# Patient Record
Sex: FEMALE | Race: WHITE | Employment: OTHER | URBAN - METROPOLITAN AREA
[De-identification: names, ages, dates, MRNs, and addresses within clinical notes are randomized per-mention and may not be internally consistent; named-entity substitution may affect disease eponyms.]

---

## 2017-01-04 ENCOUNTER — OFFICE VISIT (OUTPATIENT)
Dept: FAMILY MEDICINE CLINIC | Age: 72
End: 2017-01-04

## 2017-01-04 VITALS
RESPIRATION RATE: 16 BRPM | BODY MASS INDEX: 21.56 KG/M2 | SYSTOLIC BLOOD PRESSURE: 100 MMHG | HEART RATE: 92 BPM | TEMPERATURE: 98.2 F | OXYGEN SATURATION: 98 % | HEIGHT: 67 IN | WEIGHT: 137.4 LBS | DIASTOLIC BLOOD PRESSURE: 80 MMHG

## 2017-01-04 DIAGNOSIS — Z11.59 NEED FOR HEPATITIS C SCREENING TEST: ICD-10-CM

## 2017-01-04 DIAGNOSIS — G43.009 MIGRAINE WITHOUT AURA AND WITHOUT STATUS MIGRAINOSUS, NOT INTRACTABLE: ICD-10-CM

## 2017-01-04 DIAGNOSIS — E55.9 HYPOVITAMINOSIS D: ICD-10-CM

## 2017-01-04 DIAGNOSIS — R63.4 WEIGHT LOSS: Primary | ICD-10-CM

## 2017-01-04 DIAGNOSIS — E78.00 PURE HYPERCHOLESTEROLEMIA: ICD-10-CM

## 2017-01-04 RX ORDER — HYDROCODONE BITARTRATE AND ACETAMINOPHEN 7.5; 325 MG/1; MG/1
1 TABLET ORAL
Qty: 40 TAB | Refills: 0 | Status: SHIPPED | OUTPATIENT
Start: 2017-01-04 | End: 2017-12-04 | Stop reason: ALTCHOICE

## 2017-01-04 RX ORDER — CYPROHEPTADINE HYDROCHLORIDE 4 MG/1
4 TABLET ORAL 2 TIMES DAILY
Qty: 60 TAB | Refills: 2 | Status: SHIPPED | OUTPATIENT
Start: 2017-01-04 | End: 2017-04-04

## 2017-01-04 RX ORDER — TRAMADOL HYDROCHLORIDE 50 MG/1
50 TABLET ORAL
Qty: 50 TAB | Refills: 1 | Status: SHIPPED | OUTPATIENT
Start: 2017-01-04 | End: 2017-12-04 | Stop reason: ALTCHOICE

## 2017-01-04 NOTE — PROGRESS NOTES
Deborah Hernandez is a 70 y.o. female  Pt here today for follow up visit. 1. Have you been to the ER, urgent care clinic since your last visit? Hospitalized since your last visit? Yes Where: [facility]    2. Have you seen or consulted any other health care providers outside of the [address] [place] [address] since your last visit? Include any pap smears or colon screening.  Yes Where: neurology

## 2017-01-04 NOTE — MR AVS SNAPSHOT
Visit Information Date & Time Provider Department Dept. Phone Encounter #  
 1/4/2017  2:30 PM Lizbet Pina, 3 [facility] [phone] Upcoming Health Maintenance Date Due Hepatitis C Screening 1945 GLAUCOMA SCREENING Q2Y 7/7/2012 MEDICARE YEARLY EXAM 7/1/2017 BREAST CANCER SCRN MAMMOGRAM 7/26/2018 COLONOSCOPY 2/19/2020 DTaP/Tdap/Td series (3 - Td) 4/22/2026 Allergies as of 1/4/2017  Review Complete On: 1/4/2017 By: Lizbet Pina MD  
  
 Severity Noted Reaction Type Reactions Pcn [Penicillins]  04/30/2010    Rash Was tested is no longer allergic Current Immunizations  Reviewed on 6/30/2016 Name Date Influenza High Dose Vaccine PF 10/27/2016  9:24 AM, 10/19/2015 10:00 AM  
 Influenza Vaccine 11/18/2014 Influenza Vaccine Split 10/16/2012, 11/1/2010 Influenza Vaccine Whole 10/5/2011 PPD 9/14/2010 Pneumococcal Polysaccharide (PPSV-23) 2/24/2014, 1/1/2014 Pneumococcal Vaccine (Unspecified Type) 1/1/2007 TDAP Vaccine 4/13/2010 Td, Adsorbed PF 4/20/2010 Tdap 4/22/2016 Zoster 11/2/2009 Not reviewed this visit You Were Diagnosed With   
  
 Codes Comments Need for hepatitis C screening test    -  Primary ICD-10-CM: Z11.59 
ICD-9-CM: V73.89 Migraine without aura and without status migrainosus, not intractable     ICD-10-CM: Z13.336 ICD-9-CM: 346.10 Pure hypercholesterolemia     ICD-10-CM: E78.00 ICD-9-CM: 272.0 Weight loss     ICD-10-CM: R63.4 ICD-9-CM: 783.21 Hypovitaminosis D     ICD-10-CM: E55.9 ICD-9-CM: 268.9 Vitals BP Pulse Temp Resp Height(growth percentile) Weight(growth percentile) 100/80 92 98.2 °F (36.8 °C) (Oral) 16 5' 7\" (1.702 m) 137 lb 6.4 oz (62.3 kg) SpO2 BMI OB Status Smoking Status 98% 21.52 kg/m2 Postmenopausal Never Smoker Vitals History BMI and BSA Data Body Mass Index Body Surface Area 21.52 kg/m 2 1.72 m 2 Preferred Pharmacy Pharmacy Name Phone [id number] Neeraj Adame [phone] Your Updated Medication List  
  
   
This list is accurate as of: 1/4/17  3:30 PM.  Always use your most recent med list.  
  
  
  
  
 ALPRAZolam 0.25 mg tablet Commonly known as:  Harland Billy Take 1 Tab by mouth two (2) times daily as needed for Anxiety. atorvastatin 20 mg tablet Commonly known as:  LIPITOR Take 1 Tab by mouth daily. CALCIUM 600 + D 600-125 mg-unit Tab Generic drug:  calcium-cholecalciferol (d3) Take  by mouth two (2) times a day. CHEW Q 100 mg Chew Generic drug:  coenzyme q10 Take  by mouth daily. clonazePAM 0.5 mg tablet Commonly known as:  Hiro Aver Take  by mouth nightly as needed. cyproheptadine 4 mg tablet Commonly known as:  PERIACTIN Take 1 Tab by mouth two (2) times a day for 90 days. desonide 0.05 % cream  
Commonly known as:  Iver Dannieach Dexlansoprazole 60 mg Cpdb Commonly known as:  DEXILANT Take 1 Cap by mouth daily. FLUoxetine 40 mg capsule Commonly known as:  PROzac Take 1 Cap by mouth daily. HYDROcodone-acetaminophen 7.5-325 mg per tablet Commonly known as:  Vinod Azul Take 1 Tab by mouth every eight (8) hours as needed for Pain.  
  
 levothyroxine 50 mcg tablet Commonly known as:  SYNTHROID Take 1 Tab by mouth Daily (before breakfast). magnesium Tab Take  by mouth two (2) times a day. multivitamin tablet Commonly known as:  ONE A DAY Take 1 Tab by mouth daily. naratriptan 2.5 mg Tab Commonly known as:  Ruiz Coats Take 2.5 mg by mouth once as needed. sucralfate 100 mg/mL suspension Commonly known as:  Danney Jeffrey Take 5 mL by mouth Before breakfast and dinner. topiramate 25 mg tablet Commonly known as:  TOPAMAX Take  by mouth four (4) times daily. traMADol 50 mg tablet Commonly known as:  ULTRAM  
Take 1 Tab by mouth every six (6) hours as needed for Pain. Max Daily Amount: 200 mg.  
  
 verapamil  mg ER capsule Commonly known as:  VERELAN  
TAKE 1 CAPSULE DAILY  
  
 VITAMIN B-2 100 mg tablet Generic drug:  riboflavin (vitamin B2) TAKE 1 TABLET BY MOUTH TWO TIMES A DAY. Prescriptions Printed Refills  
 traMADol (ULTRAM) 50 mg tablet 1 Sig: Take 1 Tab by mouth every six (6) hours as needed for Pain. Max Daily Amount: 200 mg. Class: Print Route: Oral  
 HYDROcodone-acetaminophen (NORCO) 7.5-325 mg per tablet 0 Sig: Take 1 Tab by mouth every eight (8) hours as needed for Pain. Class: Print Route: Oral  
  
Prescriptions Sent to Pharmacy Refills  
 cyproheptadine (PERIACTIN) 4 mg tablet 2 Sig: Take 1 Tab by mouth two (2) times a day for 90 days. Class: Normal  
 Pharmacy: [address] #: [phone] Route: Oral  
  
To-Do List   
 01/04/2017 Lab:  CBC WITH AUTOMATED DIFF   
  
 01/04/2017 Lab:  HEPATIC FUNCTION PANEL   
  
 01/04/2017 Lab:  HEPATITIS C AB   
  
 01/04/2017 Lab:  LIPID PANEL   
  
 01/04/2017 Lab:  METABOLIC PANEL, BASIC   
  
 01/04/2017 Lab:  T4, FREE   
  
 01/04/2017 Lab:  TSH 3RD GENERATION   
  
 01/04/2017 Lab:  URINALYSIS W/ RFLX MICROSCOPIC   
  
 01/04/2017 Lab:  VITAMIN D, 25 HYDROXY Referral Information Referral ID Referred By Referred To  
  
 [id number] Freddy ALVAREZ Not Available Visits Status Start Date End Date 1 New Request 1/4/17 1/4/18 If your referral has a status of pending review or denied, additional information will be sent to support the outcome of this decision. Introducing [facility]! Dear Elidia Bai: Thank you for requesting a [employer] account. Our records indicate that you already have an active [employer] account.   You can access your account anytime at [email or web address] Did you know that you can access your hospital and ER discharge instructions at any time in [employer]? You can also review all of your test results from your hospital stay or ER visit. Additional Information If you have questions, please visit the Frequently Asked Questions section of the [employer] website at [email or web address] Remember, [employer] is NOT to be used for urgent needs. For medical emergencies, dial 911. Now available from your iPhone and Android! Please provide this summary of care documentation to your next provider. Your primary care clinician is listed as Ananth 51. If you have any questions after today's visit, please call [phone].

## 2017-01-05 NOTE — PROGRESS NOTES
Assessment/Plan:    Wisam Mina was seen today for medication evaluation and arm pain. Diagnoses and all orders for this visit:    Weight loss  -     cyproheptadine (PERIACTIN) 4 mg tablet; Take 1 Tab by mouth two (2) times a day for 90 days.  -     REFERRAL TO GASTROENTEROLOGY    Migraine without aura and without status migrainosus, not intractable  -     traMADol (ULTRAM) 50 mg tablet; Take 1 Tab by mouth every six (6) hours as needed for Pain. Max Daily Amount: 200 mg.  -     HYDROcodone-acetaminophen (NORCO) 7.5-325 mg per tablet; Take 1 Tab by mouth every eight (8) hours as needed for Pain. Need for hepatitis C screening test  -     HEPATITIS C AB; Future    Pure hypercholesterolemia  -     CBC WITH AUTOMATED DIFF; Future  -     HEPATIC FUNCTION PANEL; Future  -     LIPID PANEL; Future  -     METABOLIC PANEL, BASIC; Future  -     TSH 3RD GENERATION; Future  -     T4, FREE; Future  -     URINALYSIS W/ RFLX MICROSCOPIC; Future  -     VITAMIN D, 25 HYDROXY; Future    Hypovitaminosis D  -     VITAMIN D, 25 HYDROXY; Future        Will do a trial of cyproheptadine to see if this can stimulate her appetite. There has been successful off label use of this in past.    F/u 6 weeks for wt check. Will try referring for for a 2nd opinion on her wt loss and gastric issues to a new GI. The plan was discussed with the patient. The patient verbalized understanding and is in agreement with the plan. All medication potential side effects were discussed with the patient.    -------------------------------------------------------------------------------------------------------------------        Michael Deleon is a 70 y.o. female and presents with Medication Evaluation and Arm Pain         Subjective:  Pt returns for f/u. Still struggling with her weight loss. Has been linked to her GI issues (review other GI notes and office notes in chart for details).   She has worked with Dr. Satish Blunt for an extended period of time in trying to figure out the cause of her loss of appetite and intolerance to various types of foods but she has not found any solution. She says he has done all that he can do for her at this point. She is frustrated and concerned about the wt loss and rightly so. She has had extensive work up and scans to r/o malignancy. She has had negative CTs of abd pelvis and chest CTs x 2 since 2010 that showed stable pulmonary nodules with no past smoking history. No further CTs of chest were recommended. Also had a CXR in Oct 2016 which showed no masses. Migranes: pt working with Dr. Loretta Dandy and making progress but need pain meds filled. Dr. Loretta Dandy will not do this. ROS:  Constitutional: No recent weight change. No weakness/fatigue. No f/c. Skin: No rashes, change in nails/hair, itching   HENT: No HA, dizziness. No hearing loss/tinnitus. No nasal congestion/discharge. Eyes: No change in vision, double/blurred vision or eye pain/redness. Cardiovascular: No CP/palpitations. No BURCH/orthopnea/PND. Respiratory: No cough/sputum, dyspnea, wheezing. Gastointestinal: No dysphagia, reflux. No n/v. No constipation/diarrhea. No melena/rectal bleeding. Genitourinary: No dysuria, urinary hesitancy, nocturia, hematuria. No incontinence. Musculoskeletal: No joint pain/stiffness. No muscle pain/tenderness. Endo: No heat/cold intolerance, no polyuria/polydypsia. Heme: No h/o anemia. No easy bleeding/bruising. Allergy/Immunology: No seasonal rhinitis. Denies frequent colds, sinus/ear infections. Neurological: No seizures/numbness/weakness. No paresthesias. Psychiatric:  No depression, anxiety. The problem list was updated as a part of today's visit.   Patient Active Problem List   Diagnosis Code    Hyperlipidemia E78.5    Connective tissue disease ([facility] [address] [address] M35.9    Depression F32.9    Hypertension I10    HH (hiatus hernia) K44.9    Gastroesophageal reflux disease K21.9    Rosacea L71.9  Capsulitis M77.9    Degeneration of intervertebral disc of cervical region M50.30    Allergy T78.40XA    Colitis, ischemic (HCC) K55.9    Migraine G43.909    Anxiety associated with depression F41.8    Raynaud's syndrome I73.00    Hypothyroidism E03.9    Pulmonary nodules R91.8    Renal cyst N28.1    Hypovitaminosis D E55.9    Biliary dyskinesia K82.8    Chronic tension-type headache, intractable G44.221    Osteoarthritis M19.90    Vascular insufficiency of intestine (HCC) K55.9    Dyslipidemia E78.5    Classical migraine with intractable migraine G43.119    Rotator cuff tear arthropathy M12.819    Lower gastrointestinal hemorrhage K92.2    Cervico-occipital neuralgia M54.81    Shoulder pain M25.519    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 SH:  Social History   Substance Use Topics    Smoking status: Never Smoker    Smokeless tobacco: Never Used    Alcohol use No       Medications/Allergies:  Current Outpatient Prescriptions on File Prior to Visit   Medication Sig Dispense Refill    FLUoxetine (PROZAC) 40 mg capsule Take 1 Cap by mouth daily. 90 Cap 3    verapamil ER (VERELAN) 360 mg ER capsule TAKE 1 CAPSULE DAILY 90 Cap 2    clonazePAM (KLONOPIN) 0.5 mg tablet Take  by mouth nightly as needed.  VITAMIN B-2 100 mg tablet TAKE 1 TABLET BY MOUTH TWO TIMES A DAY. 180 Tab 0    topiramate (TOPAMAX) 25 mg tablet Take  by mouth four (4) times daily.  naratriptan (AMERGE) 2.5 mg tab Take 2.5 mg by mouth once as needed.  levothyroxine (SYNTHROID) 50 mcg tablet Take 1 Tab by mouth Daily (before breakfast). 90 Tab 2    atorvastatin (LIPITOR) 20 mg tablet Take 1 Tab by mouth daily. 90 Tab 2    Dexlansoprazole (DEXILANT) 60 mg CpDB Take 1 Cap by mouth daily.  90 Cap 3    calcium-cholecalciferol, d3, (CALCIUM 600 + D) 600-125 mg-unit tab Take by mouth two (2) times a day.  coenzyme q10 (CHEW Q) 100 mg chew Take  by mouth daily.  multivitamin (ONE A DAY) tablet Take 1 Tab by mouth daily.  magnesium Tab Take  by mouth two (2) times a day.  desonide (TRIDESILON) 0.05 % cream   3    sucralfate (CARAFATE) 100 mg/mL suspension Take 5 mL by mouth Before breakfast and dinner. 414 mL 3    ALPRAZolam (XANAX) 0.25 mg tablet Take 1 Tab by mouth two (2) times daily as needed for Anxiety. 50 Tab 0     No current facility-administered medications on file prior to visit. Allergies   Allergen Reactions    Pcn [Penicillins] Rash     Was tested is no longer allergic         Health Maintenance:   Health Maintenance   Topic Date Due    Hepatitis C Screening  1945    GLAUCOMA SCREENING Q2Y  07/07/2012    MEDICARE YEARLY EXAM  07/01/2017    BREAST CANCER SCRN MAMMOGRAM  07/26/2018    COLONOSCOPY  02/19/2020    DTaP/Tdap/Td series (3 - Td) 04/22/2026    OSTEOPOROSIS SCREENING (DEXA)  Completed    ZOSTER VACCINE AGE 60>  Completed    Pneumococcal 65+ Low/Medium Risk  Completed    INFLUENZA AGE 9 TO ADULT  Addressed       Objective:  Visit Vitals    /80    Pulse 92    Temp 98.2 °F (36.8 °C) (Oral)    Resp 16    Ht 5' 7\" (1.702 m)    Wt 137 lb 6.4 oz (62.3 kg)    SpO2 98%    BMI 21.52 kg/m2          Nurses notes and VS reviewed. Physical Examination: General appearance - looks underweight.         Labwork and Ancillary Studies:    CBC w/Diff  Lab Results   Component Value Date/Time    WBC 5.2 11/11/2016 11:07 AM    HGB 14.5 11/11/2016 11:07 AM    PLATELET 728 13/10/5907 11:07 AM         Basic Metabolic Profile  Lab Results   Component Value Date/Time    Sodium 144 11/11/2016 11:07 AM    Potassium 3.6 11/11/2016 11:07 AM    Chloride 109 11/11/2016 11:07 AM    CO2 25 11/11/2016 11:07 AM    CO2, TOTAL 23 04/28/2016 01:48 PM    Anion gap 10 11/11/2016 11:07 AM    Glucose 81 11/11/2016 11:07 AM    BUN 12 11/11/2016 11:07 AM    Creatinine 0.95 11/11/2016 11:07 AM    BUN/Creatinine ratio 13 11/11/2016 11:07 AM    GFR est AA >60 11/11/2016 11:07 AM    GFR est non-AA 58 11/11/2016 11:07 AM    Calcium 8.8 11/11/2016 11:07 AM         LFT  Lab Results   Component Value Date/Time    ALT 18 11/11/2016 11:07 AM    AST 15 11/11/2016 11:07 AM    Alk.  phosphatase 62 11/11/2016 11:07 AM    Bilirubin, direct 0.2 02/19/2016 09:55 AM    Bilirubin, total 0.3 11/11/2016 11:07 AM         Cholesterol  Lab Results   Component Value Date/Time    Cholesterol, total 144 02/19/2016 09:55 AM    HDL Cholesterol 73 02/19/2016 09:55 AM    LDL, calculated 58.4 02/19/2016 09:55 AM    Triglyceride 63 02/19/2016 09:55 AM    CHOL/HDL Ratio 2.0 02/19/2016 09:55 AM

## 2017-01-31 ENCOUNTER — HOSPITAL ENCOUNTER (OUTPATIENT)
Dept: LAB | Age: 72
Discharge: HOME | End: 2017-01-31
Payer: MEDICARE

## 2017-01-31 ENCOUNTER — OFFICE VISIT (OUTPATIENT)
Dept: FAMILY MEDICINE CLINIC | Age: 72
End: 2017-01-31

## 2017-01-31 VITALS
OXYGEN SATURATION: 100 % | TEMPERATURE: 97.2 F | HEART RATE: 80 BPM | BODY MASS INDEX: 22.29 KG/M2 | HEIGHT: 67 IN | WEIGHT: 142 LBS | DIASTOLIC BLOOD PRESSURE: 85 MMHG | SYSTOLIC BLOOD PRESSURE: 143 MMHG | RESPIRATION RATE: 16 BRPM

## 2017-01-31 DIAGNOSIS — M79.675 PAIN OF TOE OF LEFT FOOT: ICD-10-CM

## 2017-01-31 DIAGNOSIS — M79.675 PAIN OF TOE OF LEFT FOOT: Primary | ICD-10-CM

## 2017-01-31 LAB — URATE SERPL-MCNC: 3.5 MG/DL (ref 2.6–7.2)

## 2017-01-31 PROCEDURE — 84550 ASSAY OF BLOOD/URIC ACID: CPT | Performed by: NURSE PRACTITIONER

## 2017-01-31 PROCEDURE — 36415 COLL VENOUS BLD VENIPUNCTURE: CPT | Performed by: NURSE PRACTITIONER

## 2017-01-31 NOTE — PROGRESS NOTES
HISTORY OF PRESENT ILLNESS  Clarissa Whitaker is a 70 y.o. female. HPI acute onset of left great toe pain aggravated with weight bearing of unknown etiology. There is mild soft tissue swelling to the toe in general and erythema laterally which improves when she is off her feet that was noted in the clinic this morning. No fever, trauma, or injury. No treatment to date. Symptom onset acute, timing constant, pain mild to moderate. Past Medical History   Diagnosis Date    Allergy     Anxiety associated with depression 8/2/2011    Arthritis     Colitis, ischemic (HCC)     Connective tissue disease ([facility] [address]     Degenerative disc disease     Depression      anxiety    GERD (gastroesophageal reflux disease)     Headache(784.0)      migraines    HH (hiatus hernia)     Hyperlipidemia     Hypertension     Hypovitaminosis D 10/19/2015    Other forms of migraine, without mention of intractable migraine without mention of status migrainosus     Pulmonary nodules 2/25/2010    Raynaud's syndrome     Renal cyst 2/25/2014    Rosacea        Current Outpatient Prescriptions:     cyproheptadine (PERIACTIN) 4 mg tablet, Take 1 Tab by mouth two (2) times a day for 90 days. , Disp: 60 Tab, Rfl: 2    traMADol (ULTRAM) 50 mg tablet, Take 1 Tab by mouth every six (6) hours as needed for Pain. Max Daily Amount: 200 mg., Disp: 50 Tab, Rfl: 1    HYDROcodone-acetaminophen (NORCO) 7.5-325 mg per tablet, Take 1 Tab by mouth every eight (8) hours as needed for Pain., Disp: 40 Tab, Rfl: 0    FLUoxetine (PROZAC) 40 mg capsule, Take 1 Cap by mouth daily. , Disp: 90 Cap, Rfl: 3    verapamil ER (VERELAN) 360 mg ER capsule, TAKE 1 CAPSULE DAILY, Disp: 90 Cap, Rfl: 2    desonide (TRIDESILON) 0.05 % cream, , Disp: , Rfl: 3    clonazePAM (KLONOPIN) 0.5 mg tablet, Take  by mouth nightly as needed. , Disp: , Rfl:     sucralfate (CARAFATE) 100 mg/mL suspension, Take 5 mL by mouth Before breakfast and dinner., Disp: 414 mL, Rfl: 3   VITAMIN B-2 100 mg tablet, TAKE 1 TABLET BY MOUTH TWO TIMES A DAY., Disp: 180 Tab, Rfl: 0    topiramate (TOPAMAX) 25 mg tablet, Take  by mouth four (4) times daily. , Disp: , Rfl:     naratriptan (AMERGE) 2.5 mg tab, Take 2.5 mg by mouth once as needed. , Disp: , Rfl:     ALPRAZolam (XANAX) 0.25 mg tablet, Take 1 Tab by mouth two (2) times daily as needed for Anxiety. , Disp: 50 Tab, Rfl: 0    levothyroxine (SYNTHROID) 50 mcg tablet, Take 1 Tab by mouth Daily (before breakfast). , Disp: 90 Tab, Rfl: 2    atorvastatin (LIPITOR) 20 mg tablet, Take 1 Tab by mouth daily. , Disp: 90 Tab, Rfl: 2    Dexlansoprazole (DEXILANT) 60 mg CpDB, Take 1 Cap by mouth daily. , Disp: 90 Cap, Rfl: 3    calcium-cholecalciferol, d3, (CALCIUM 600 + D) 600-125 mg-unit tab, Take  by mouth two (2) times a day., Disp: , Rfl:     coenzyme q10 (CHEW Q) 100 mg chew, Take  by mouth daily. , Disp: , Rfl:     multivitamin (ONE A DAY) tablet, Take 1 Tab by mouth daily. , Disp: , Rfl:     magnesium Tab, Take  by mouth two (2) times a day., Disp: , Rfl:     Review of Systems   Constitutional: Positive for weight loss. Negative for fever. Continued weight loss which is being managed by PCP with GI consult pending. HENT: Negative. Eyes: Negative. Respiratory: Negative. Cardiovascular: Negative. Gastrointestinal: Negative. Negative for abdominal pain, heartburn, nausea and vomiting. Musculoskeletal: Positive for joint pain. Left great toe. Skin: Negative. Negative for itching and rash. Neurological: Negative for sensory change and focal weakness. Physical Exam   Constitutional: She is oriented to person, place, and time. She appears well-developed. No distress. Thin female; baseline appearance. HENT:   Head: Normocephalic and atraumatic. Eyes: EOM are normal. Pupils are equal, round, and reactive to light. No scleral icterus. Neck: No tracheal deviation present.    Cardiovascular: Normal rate, regular rhythm, normal heart sounds and intact distal pulses. Pulmonary/Chest: Effort normal and breath sounds normal. No stridor. No respiratory distress. She has no wheezes. She has no rales. Abdominal: Soft. Bowel sounds are normal. She exhibits no distension. There is no tenderness. There is no rebound. Musculoskeletal: Normal range of motion. She exhibits tenderness. She exhibits no edema or deformity. Bilateral bunions noted bilaterally. Feet are warm and equal in temperature. Neurological: She is alert and oriented to person, place, and time. She exhibits normal muscle tone. Coordination normal.   Skin: Skin is warm and dry. No rash noted. She is not diaphoretic. No erythema. Psychiatric: She has a normal mood and affect. Her behavior is normal. Judgment and thought content normal.   Nursing note and vitals reviewed. ASSESSMENT and PLAN  1. Acute left great toe pain:   -Xray of left foot   -lab; cbc w/diff, uric acid   -No NAID therapy due to patient's GI history. Recommended Tramadol ordered by PCP for pain, rest, elevate left foot and cool compress.   -Avoid prolonged weight bearing. The patient is leaving town for 2 days. She was asked to phone the clinic if signs and symptoms of cellulitis develop which were reviewed in detail. Will recommend recheck in the clinic at that time. Patient is in agreement with the treatment plan. All questions answered and discharge instructions reviewed with the patient.

## 2017-01-31 NOTE — MR AVS SNAPSHOT
Visit Information Date & Time Provider Department Dept. Phone Encounter #  
 1/31/2017 10:30 AM Trinity Deshpande, [address] [phone] [id number] Your Appointments 2/15/2017  1:00 PM  
Office Visit with Olinda Lynch MD  
[address] [facility]) Appt Note: 6wk f/u appt [address] [place] [id number] [phone]  
  
   
 [address] [facility] 280 [facility] Upcoming Health Maintenance Date Due Hepatitis C Screening 1945 GLAUCOMA SCREENING Q2Y 7/7/2012 MEDICARE YEARLY EXAM 7/1/2017 BREAST CANCER SCRN MAMMOGRAM 7/26/2018 COLONOSCOPY 2/19/2020 DTaP/Tdap/Td series (3 - Td) 4/22/2026 Allergies as of 1/31/2017  Review Complete On: 1/31/2017 By: Ariella Jackson LPN Severity Noted Reaction Type Reactions Pcn [Penicillins]  04/30/2010    Rash Was tested is no longer allergic Current Immunizations  Reviewed on 6/30/2016 Name Date Influenza High Dose Vaccine PF 10/27/2016  9:24 AM, 10/19/2015 10:00 AM  
 Influenza Vaccine 11/18/2014 Influenza Vaccine Split 10/16/2012, 11/1/2010 Influenza Vaccine Whole 10/5/2011 PPD 9/14/2010 Pneumococcal Polysaccharide (PPSV-23) 2/24/2014, 1/1/2014 Pneumococcal Vaccine (Unspecified Type) 1/1/2007 TDAP Vaccine 4/13/2010 Td, Adsorbed PF 4/20/2010 Tdap 4/22/2016 Zoster 11/2/2009 Not reviewed this visit You Were Diagnosed With   
  
 Codes Comments Pain of toe of left foot    -  Primary ICD-10-CM: O04.568 ICD-9-CM: 729.5 Vitals BP Pulse Temp Resp Height(growth percentile) Weight(growth percentile) 143/85 (BP 1 Location: Left arm, BP Patient Position: Sitting) 80 97.2 °F (36.2 °C) (Oral) 16 5' 7\" (1.702 m) 142 lb (64.4 kg) SpO2 BMI OB Status Smoking Status 100% 22.24 kg/m2 Postmenopausal Never Smoker BMI and BSA Data Body Mass Index Body Surface Area  
 22.24 kg/m 2 1.74 m 2 Preferred Pharmacy Pharmacy Name Phone [id number] Neeraj Salas [phone] Your Updated Medication List  
  
   
This list is accurate as of: 1/31/17 10:52 AM.  Always use your most recent med list.  
  
  
  
  
 ALPRAZolam 0.25 mg tablet Commonly known as:  Malathi Rizwan Take 1 Tab by mouth two (2) times daily as needed for Anxiety. atorvastatin 20 mg tablet Commonly known as:  LIPITOR Take 1 Tab by mouth daily. CALCIUM 600 + D 600-125 mg-unit Tab Generic drug:  calcium-cholecalciferol (d3) Take  by mouth two (2) times a day. CHEW Q 100 mg Chew Generic drug:  coenzyme q10 Take  by mouth daily. clonazePAM 0.5 mg tablet Commonly known as:  Melissa Slay Take  by mouth nightly as needed. cyproheptadine 4 mg tablet Commonly known as:  PERIACTIN Take 1 Tab by mouth two (2) times a day for 90 days. desonide 0.05 % cream  
Commonly known as:  Sharron Mejia Dexlansoprazole 60 mg Cpdb Commonly known as:  DEXILANT Take 1 Cap by mouth daily. FLUoxetine 40 mg capsule Commonly known as:  PROzac Take 1 Cap by mouth daily. HYDROcodone-acetaminophen 7.5-325 mg per tablet Commonly known as:  Angel Handler Take 1 Tab by mouth every eight (8) hours as needed for Pain.  
  
 levothyroxine 50 mcg tablet Commonly known as:  SYNTHROID Take 1 Tab by mouth Daily (before breakfast). magnesium Tab Take  by mouth two (2) times a day. multivitamin tablet Commonly known as:  ONE A DAY Take 1 Tab by mouth daily. naratriptan 2.5 mg Tab Commonly known as:  Mazin Dues Take 2.5 mg by mouth once as needed. sucralfate 100 mg/mL suspension Commonly known as:  Eudelia Brocks Take 5 mL by mouth Before breakfast and dinner. topiramate 25 mg tablet Commonly known as:  TOPAMAX Take  by mouth four (4) times daily. traMADol 50 mg tablet Commonly known as:  ULTRAM  
Take 1 Tab by mouth every six (6) hours as needed for Pain. Max Daily Amount: 200 mg.  
  
 verapamil  mg ER capsule Commonly known as:  VERELAN  
TAKE 1 CAPSULE DAILY  
  
 VITAMIN B-2 100 mg tablet Generic drug:  riboflavin (vitamin B2) TAKE 1 TABLET BY MOUTH TWO TIMES A DAY. To-Do List   
 01/31/2017 Lab:  URIC ACID   
  
 01/31/2017 Imaging:  XR FOOT LT MIN 3 V Patient Instructions Gout: Care Instructions Your Care Instructions Gout is a form of arthritis caused by a buildup of uric acid crystals in a joint. It causes sudden attacks of pain, swelling, redness, and stiffness, usually in one joint, especially the big toe. Gout usually comes on without a cause. But it can be brought on by drinking alcohol (especially beer) or eating seafood and red meat. Taking certain medicines, such as diuretics or aspirin, also can bring on an attack of gout. Taking your medicines as prescribed and following up with your doctor regularly can help you avoid gout attacks in the future. Follow-up care is a key part of your treatment and safety. Be sure to make and go to all appointments, and call your doctor if you are having problems. Its also a good idea to know your test results and keep a list of the medicines you take. How can you care for yourself at home? · If the joint is swollen, put ice or a cold pack on the area for 10 to 20 minutes at a time. Put a thin cloth between the ice and your skin. · Prop up the sore limb on a pillow when you ice it or anytime you sit or lie down during the next 3 days. Try to keep it above the level of your heart. This will help reduce swelling. · Rest sore joints. Avoid activities that put weight or strain on the joints for a few days. Take short rest breaks from your regular activities during the day. · Take your medicines exactly as prescribed.  Call your doctor if you think you are having a problem with your medicine. · Take pain medicines exactly as directed. ¨ If the doctor gave you a prescription medicine for pain, take it as prescribed. ¨ If you are not taking a prescription pain medicine, ask your doctor if you can take an over-the-counter medicine. · Eat less seafood and red meat. · Check with your doctor before drinking alcohol. · Losing weight, if you are overweight, may help reduce attacks of gout. But do not go on a [employer]. \" Losing a lot of weight in a short amount of time can cause a gout attack. When should you call for help? Call your doctor now or seek immediate medical care if: 
· You have a fever. · The joint is so painful you cannot use it. · You have sudden, unexplained swelling, redness, warmth, or severe pain in one or more joints. Watch closely for changes in your health, and be sure to contact your doctor if: 
· You have joint pain. · Your symptoms get worse or are not improving after 2 or 3 days. Where can you learn more? Go to http://gilda-michael.info/. Enter A652 in the search box to learn more about \"Gout: Care Instructions. \" Current as of: February 24, 2016 Content Version: 11.1 © 7501-4051 [email or web address]. Care instructions adapted under license by [employer] (which disclaims liability or warranty for this information). If you have questions about a medical condition or this instruction, always ask your healthcare professional. [place] [address] any warranty or liability for your use of this information. Introducing [facility]! Dear Vianney Chi: Thank you for requesting a [email or web address] account. Our records indicate that you already have an active [email or web address] account. You can access your account anytime at [email or web address] Did you know that you can access your hospital and ER discharge instructions at any time in Intellitix? You can also review all of your test results from your hospital stay or ER visit. Additional Information If you have questions, please visit the Frequently Asked Questions section of the Intellitix website at [email or web address] Remember, Intellitix is NOT to be used for urgent needs. For medical emergencies, dial 911. Now available from your iPhone and Android! Please provide this summary of care documentation to your next provider. Your primary care clinician is listed as Öjessie 51. If you have any questions after today's visit, please call [phone].

## 2017-01-31 NOTE — PROGRESS NOTES
Maryam Poole is a 70 y.o. female here today for toe pain and swelling x 2 days. 1. Have you been to the ER, urgent care clinic or hospitalized since your last visit? NO.     2. Have you seen or consulted any other health care providers outside of the [address] [place] [address] since your last visit (Include any pap smears or colon screening)? NO      Do you have an Advanced Directive? NO    Would you like information on Advanced Directives?  NO    Learning Assessment 2/3/2015   PRIMARY LEARNER Patient   HIGHEST LEVEL OF EDUCATION - PRIMARY LEARNER  4 YEARS OF COLLEGE   BARRIERS PRIMARY LEARNER NONE   CO-LEARNER CAREGIVER No   PRIMARY LANGUAGE ENGLISH   LEARNER PREFERENCE PRIMARY LISTENING   ANSWERED BY self   RELATIONSHIP SELF

## 2017-01-31 NOTE — PATIENT INSTRUCTIONS

## 2017-02-15 ENCOUNTER — OFFICE VISIT (OUTPATIENT)
Dept: FAMILY MEDICINE CLINIC | Age: 72
End: 2017-02-15

## 2017-02-15 VITALS
DIASTOLIC BLOOD PRESSURE: 78 MMHG | TEMPERATURE: 97.3 F | HEIGHT: 67 IN | SYSTOLIC BLOOD PRESSURE: 138 MMHG | WEIGHT: 143 LBS | HEART RATE: 70 BPM | OXYGEN SATURATION: 99 % | RESPIRATION RATE: 16 BRPM | BODY MASS INDEX: 22.44 KG/M2

## 2017-02-15 DIAGNOSIS — R63.4 WEIGHT LOSS: Primary | ICD-10-CM

## 2017-02-15 DIAGNOSIS — M79.675 TOE PAIN, LEFT: ICD-10-CM

## 2017-02-15 NOTE — MR AVS SNAPSHOT
Visit Information Date & Time Provider Department Dept. Phone Encounter #  
 2/15/2017  1:00 PM Marmarivella Remy, 3 [facility] [phone] [id number] Upcoming Health Maintenance Date Due Hepatitis C Screening 1945 GLAUCOMA SCREENING Q2Y 7/7/2012 MEDICARE YEARLY EXAM 7/1/2017 BREAST CANCER SCRN MAMMOGRAM 7/26/2018 COLONOSCOPY 2/19/2020 DTaP/Tdap/Td series (3 - Td) 4/22/2026 Allergies as of 2/15/2017  Review Complete On: 2/15/2017 By: Adriel Alberto MD  
  
 Severity Noted Reaction Type Reactions Pcn [Penicillins]  04/30/2010    Rash Was tested is no longer allergic Current Immunizations  Reviewed on 6/30/2016 Name Date Influenza High Dose Vaccine PF 10/27/2016  9:24 AM, 10/19/2015 10:00 AM  
 Influenza Vaccine 11/18/2014 Influenza Vaccine Split 10/16/2012, 11/1/2010 Influenza Vaccine Whole 10/5/2011 PPD 9/14/2010 Pneumococcal Polysaccharide (PPSV-23) 2/24/2014, 1/1/2014 Pneumococcal Vaccine (Unspecified Type) 1/1/2007 TDAP Vaccine 4/13/2010 Td, Adsorbed PF 4/20/2010 Tdap 4/22/2016 Zoster 11/2/2009 Not reviewed this visit You Were Diagnosed With   
  
 Codes Comments Weight loss    -  Primary ICD-10-CM: R63.4 ICD-9-CM: 783.21 Toe pain, left     ICD-10-CM: N59.980 ICD-9-CM: 729.5 Vitals BP Pulse Temp Resp Height(growth percentile) Weight(growth percentile) 138/78 (BP 1 Location: Right arm, BP Patient Position: Sitting) 70 97.3 °F (36.3 °C) (Oral) 16 5' 7\" (1.702 m) 143 lb (64.9 kg) SpO2 BMI OB Status Smoking Status 99% 22.4 kg/m2 Postmenopausal Never Smoker Vitals History BMI and BSA Data Body Mass Index Body Surface Area  
 22.4 kg/m 2 1.75 m 2 Preferred Pharmacy Pharmacy Name Phone [id number] Fahad Logan, Neeraj Alberto [place] [phone] Your Updated Medication List  
  
   
 This list is accurate as of: 2/15/17  1:30 PM.  Always use your most recent med list.  
  
  
  
  
 ALPRAZolam 0.25 mg tablet Commonly known as:  Parish Carman Take 1 Tab by mouth two (2) times daily as needed for Anxiety. atorvastatin 20 mg tablet Commonly known as:  LIPITOR Take 1 Tab by mouth daily. CALCIUM 600 + D 600-125 mg-unit Tab Generic drug:  calcium-cholecalciferol (d3) Take  by mouth two (2) times a day. CHEW Q 100 mg Chew Generic drug:  coenzyme q10 Take  by mouth daily. clonazePAM 0.5 mg tablet Commonly known as:  Catha Sabino Take  by mouth nightly as needed. cyproheptadine 4 mg tablet Commonly known as:  PERIACTIN Take 1 Tab by mouth two (2) times a day for 90 days. desonide 0.05 % cream  
Commonly known as:  Sussyquinton Cuenca Dexlansoprazole 60 mg Cpdb Commonly known as:  DEXILANT Take 1 Cap by mouth daily. FLUoxetine 40 mg capsule Commonly known as:  PROzac Take 1 Cap by mouth daily. HYDROcodone-acetaminophen 7.5-325 mg per tablet Commonly known as:  Alcira Fothergill Take 1 Tab by mouth every eight (8) hours as needed for Pain.  
  
 levothyroxine 50 mcg tablet Commonly known as:  SYNTHROID Take 1 Tab by mouth Daily (before breakfast). magnesium Tab Take  by mouth two (2) times a day. multivitamin tablet Commonly known as:  ONE A DAY Take 1 Tab by mouth daily. naratriptan 2.5 mg Tab Commonly known as:  Oneta Blanco Take 2.5 mg by mouth once as needed. sucralfate 100 mg/mL suspension Commonly known as:  Salem Marino Take 5 mL by mouth Before breakfast and dinner. topiramate 25 mg tablet Commonly known as:  TOPAMAX Take  by mouth four (4) times daily. traMADol 50 mg tablet Commonly known as:  ULTRAM  
Take 1 Tab by mouth every six (6) hours as needed for Pain. Max Daily Amount: 200 mg.  
  
 verapamil  mg ER capsule Commonly known as:  VERELAN  
TAKE 1 CAPSULE DAILY VITAMIN B-2 100 mg tablet Generic drug:  riboflavin (vitamin B2) TAKE 1 TABLET BY MOUTH TWO TIMES A DAY. To-Do List   
 02/15/2017 Imaging:  XR FOOT LT MIN 3 V Patient Instructions Foot Pain: Care Instructions Your Care Instructions Foot injuries that cause pain and swelling are fairly common. Almost all sports or home repair projects can cause a misstep that ends up as foot pain. Normal wear and tear, especially as you get older, also can cause foot pain. Most minor foot injuries will heal on their own, and home treatment is usually all you need to do. If you have a severe injury, you may need tests and treatment. Follow-up care is a key part of your treatment and safety. Be sure to make and go to all appointments, and call your doctor if you are having problems. Its also a good idea to know your test results and keep a list of the medicines you take. How can you care for yourself at home? · Take pain medicines exactly as directed. ¨ If the doctor gave you a prescription medicine for pain, take it as prescribed. ¨ If you are not taking a prescription pain medicine, ask your doctor if you can take an over-the-counter medicine. · Rest and protect your foot. Take a break from any activity that may cause pain. · Put ice or a cold pack on your foot for 10 to 20 minutes at a time. Put a thin cloth between the ice and your skin. · Prop up the sore foot on a pillow when you ice it or anytime you sit or lie down during the next 3 days. Try to keep it above the level of your heart. This will help reduce swelling. · Your doctor may recommend that you wrap your foot with an elastic bandage. Keep your foot wrapped for as long as your doctor advises. · If your doctor recommends crutches, use them as directed. · Wear roomy footwear. · As soon as pain and swelling end, begin gentle exercises of your foot. Your doctor can tell you which exercises will help. When should you call for help? Call 911 anytime you think you may need emergency care. For example, call if: 
· Your foot turns pale, white, blue, or cold. Call your doctor now or seek immediate medical care if: 
· You cannot move or stand on your foot. · Your foot looks twisted or out of its normal position. · Your foot is not stable when you step down. · You have signs of infection, such as: 
¨ Increased pain, swelling, warmth, or redness. ¨ Red streaks leading from the sore area. ¨ Pus draining from a place on your foot. ¨ A fever. · Your foot is numb or tingly. Watch closely for changes in your health, and be sure to contact your doctor if: 
· You do not get better as expected. · You have bruises from an injury that last longer than 2 weeks. Where can you learn more? Go to http://gilda-michael.info/. Enter W361 in the search box to learn more about \"Foot Pain: Care Instructions. \" Current as of: May 23, 2016 Content Version: 11.1 © 8559-0996 CrowdRise. Care instructions adapted under license by viblast (which disclaims liability or warranty for this information). If you have questions about a medical condition or this instruction, always ask your healthcare professional. [address] any warranty or liability for your use of this information. Introducing [facility]! Dear Neetu Cheek: Thank you for requesting a [employer] account. Our records indicate that you already have an active [employer] account. You can access your account anytime at [email or web address] Did you know that you can access your hospital and ER discharge instructions at any time in [employer]? You can also review all of your test results from your hospital stay or ER visit. Additional Information If you have questions, please visit the Frequently Asked Questions section of the BeFunky website at [email or web address] Remember, BeFunky is NOT to be used for urgent needs. For medical emergencies, dial 911. Now available from your iPhone and Android! Please provide this summary of care documentation to your next provider. Your primary care clinician is listed as Ananth 51. If you have any questions after today's visit, please call [phone].

## 2017-02-15 NOTE — PROGRESS NOTES
Assessment/Plan:    Raul Dove was seen today for weight loss and toe pain. Diagnoses and all orders for this visit:    Weight loss    Toe pain, left  -     XR FOOT LT MIN 3 V; Future        Will contact pt on results. She will return to see her podiatrist.    The plan was discussed with the patient. The patient verbalized understanding and is in agreement with the plan. All medication potential side effects were discussed with the patient.    -------------------------------------------------------------------------------------------------------------------        Breezy Stevens is a 70 y.o. female and presents with Weight Loss (f/u) and Toe Pain         Subjective:  Pt here for f/u of her weight. She has gained some weight on the cyproheptadine which has worked out well. She has been taking it BID x 6 weeks. Has an on going issue with LT great toe pain, has a callus on the toe as well. Went to her podiatrist, Cecy Gil who did not do much. Told her it was probably arthritis but she developed the callus after that visit. ROS:  Constitutional: No recent weight change. No weakness/fatigue. No f/c. Skin: No rashes, change in nails/hair, itching   HENT: No HA, dizziness. No hearing loss/tinnitus. No nasal congestion/discharge. Eyes: No change in vision, double/blurred vision or eye pain/redness. Cardiovascular: No CP/palpitations. No BURCH/orthopnea/PND. Respiratory: No cough/sputum, dyspnea, wheezing. Gastointestinal: No dysphagia, reflux. No n/v. No constipation/diarrhea. No melena/rectal bleeding. Genitourinary: No dysuria, urinary hesitancy, nocturia, hematuria. No incontinence. Musculoskeletal: No joint pain/stiffness. No muscle pain/tenderness. Endo: No heat/cold intolerance, no polyuria/polydypsia. Heme: No h/o anemia. No easy bleeding/bruising. Allergy/Immunology: No seasonal rhinitis. Denies frequent colds, sinus/ear infections.    Neurological: No seizures/numbness/weakness. No paresthesias. Psychiatric:  No depression, anxiety. The problem list was updated as a part of today's visit. Patient Active Problem List   Diagnosis Code    Hyperlipidemia E78.5    Connective tissue disease ([facility] [address] M35.9    Depression F32.9    Hypertension I10    HH (hiatus hernia) K44.9    Gastroesophageal reflux disease K21.9    Rosacea L71.9    Capsulitis M77.9    Degeneration of intervertebral disc of cervical region M50.30    Allergy T78.40XA    Colitis, ischemic ([facility] [address] K55.9    Migraine G43.909    Anxiety associated with depression F41.8    Raynaud's syndrome I73.00    Hypothyroidism E03.9    Pulmonary nodules R91.8    Renal cyst N28.1    Hypovitaminosis D E55.9    Biliary dyskinesia K82.8    Chronic tension-type headache, intractable G44.221    Osteoarthritis M19.90    Vascular insufficiency of intestine (HCC) K55.9    Dyslipidemia E78.5    Classical migraine with intractable migraine G43.119    Rotator cuff tear arthropathy M12.819    Lower gastrointestinal hemorrhage K92.2    Cervico-occipital neuralgia M54.81    Shoulder pain M25.519    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 SH:  Social History   Substance Use Topics    Smoking status: Never Smoker    Smokeless tobacco: Never Used    Alcohol use No       Medications/Allergies:  Current Outpatient Prescriptions on File Prior to Visit   Medication Sig Dispense Refill    cyproheptadine (PERIACTIN) 4 mg tablet Take 1 Tab by mouth two (2) times a day for 90 days. 60 Tab 2    traMADol (ULTRAM) 50 mg tablet Take 1 Tab by mouth every six (6) hours as needed for Pain. Max Daily Amount: 200 mg. 50 Tab 1    FLUoxetine (PROZAC) 40 mg capsule Take 1 Cap by mouth daily.  90 Cap 3    verapamil ER (VERELAN) 360 mg ER capsule TAKE 1 CAPSULE DAILY 90 Cap 2    clonazePAM (KLONOPIN) 0.5 mg tablet Take  by mouth nightly as needed.  VITAMIN B-2 100 mg tablet TAKE 1 TABLET BY MOUTH TWO TIMES A DAY. 180 Tab 0    topiramate (TOPAMAX) 25 mg tablet Take  by mouth four (4) times daily.  naratriptan (AMERGE) 2.5 mg tab Take 2.5 mg by mouth once as needed.  levothyroxine (SYNTHROID) 50 mcg tablet Take 1 Tab by mouth Daily (before breakfast). 90 Tab 2    atorvastatin (LIPITOR) 20 mg tablet Take 1 Tab by mouth daily. 90 Tab 2    Dexlansoprazole (DEXILANT) 60 mg CpDB Take 1 Cap by mouth daily. 90 Cap 3    calcium-cholecalciferol, d3, (CALCIUM 600 + D) 600-125 mg-unit tab Take  by mouth two (2) times a day.  coenzyme q10 (CHEW Q) 100 mg chew Take  by mouth daily.  multivitamin (ONE A DAY) tablet Take 1 Tab by mouth daily.  magnesium Tab Take  by mouth two (2) times a day.  HYDROcodone-acetaminophen (NORCO) 7.5-325 mg per tablet Take 1 Tab by mouth every eight (8) hours as needed for Pain. 40 Tab 0    desonide (TRIDESILON) 0.05 % cream   3    sucralfate (CARAFATE) 100 mg/mL suspension Take 5 mL by mouth Before breakfast and dinner. 414 mL 3    ALPRAZolam (XANAX) 0.25 mg tablet Take 1 Tab by mouth two (2) times daily as needed for Anxiety. 50 Tab 0     No current facility-administered medications on file prior to visit.          Allergies   Allergen Reactions    Pcn [Penicillins] Rash     Was tested is no longer allergic         Health Maintenance:   Health Maintenance   Topic Date Due    Hepatitis C Screening  1945    GLAUCOMA SCREENING Q2Y  07/07/2012    MEDICARE YEARLY EXAM  07/01/2017    BREAST CANCER SCRN MAMMOGRAM  07/26/2018    COLONOSCOPY  02/19/2020    DTaP/Tdap/Td series (3 - Td) 04/22/2026    OSTEOPOROSIS SCREENING (DEXA)  Completed    ZOSTER VACCINE AGE 60>  Completed    Pneumococcal 65+ Low/Medium Risk  Completed    INFLUENZA AGE 9 TO ADULT  Completed       Objective:  Visit Vitals    /78 (BP 1 Location: Right arm, BP Patient Position: Sitting)    Pulse 70    Temp 97.3 °F (36.3 °C) (Oral)    Resp 16    Ht 5' 7\" (1.702 m)    Wt 143 lb (64.9 kg)    SpO2 99%    BMI 22.4 kg/m2          Nurses notes and VS reviewed. Physical Examination: General appearance - alert, well appearing, and in no distress  Extremities - left great toe, callus on the medial aspect of dorsal toe. Labwork and Ancillary Studies:    CBC w/Diff  Lab Results   Component Value Date/Time    WBC 5.2 11/11/2016 11:07 AM    HGB 14.5 11/11/2016 11:07 AM    PLATELET 267 65/25/2244 11:07 AM         Basic Metabolic Profile  Lab Results   Component Value Date/Time    Sodium 144 11/11/2016 11:07 AM    Potassium 3.6 11/11/2016 11:07 AM    Chloride 109 11/11/2016 11:07 AM    CO2 25 11/11/2016 11:07 AM    CO2, TOTAL 23 04/28/2016 01:48 PM    Anion gap 10 11/11/2016 11:07 AM    Glucose 81 11/11/2016 11:07 AM    BUN 12 11/11/2016 11:07 AM    Creatinine 0.95 11/11/2016 11:07 AM    BUN/Creatinine ratio 13 11/11/2016 11:07 AM    GFR est AA >60 11/11/2016 11:07 AM    GFR est non-AA 58 11/11/2016 11:07 AM    Calcium 8.8 11/11/2016 11:07 AM         LFT  Lab Results   Component Value Date/Time    ALT (SGPT) 18 11/11/2016 11:07 AM    AST (SGOT) 15 11/11/2016 11:07 AM    Alk.  phosphatase 62 11/11/2016 11:07 AM    Bilirubin, direct 0.2 02/19/2016 09:55 AM    Bilirubin, total 0.3 11/11/2016 11:07 AM         Cholesterol  Lab Results   Component Value Date/Time    Cholesterol, total 144 02/19/2016 09:55 AM    HDL Cholesterol 73 02/19/2016 09:55 AM    LDL, calculated 58.4 02/19/2016 09:55 AM    Triglyceride 63 02/19/2016 09:55 AM    CHOL/HDL Ratio 2.0 02/19/2016 09:55 AM

## 2017-02-15 NOTE — PATIENT INSTRUCTIONS

## 2017-02-15 NOTE — PROGRESS NOTES
1. Have you been to the ER, urgent care clinic since your last visit? Hospitalized since your last visit? No     2. Have you seen or consulted any other health care providers outside of the [facility] since your last visit? Include any pap smears or colon screening.    No

## 2017-02-20 ENCOUNTER — OFFICE VISIT (OUTPATIENT)
Dept: FAMILY MEDICINE CLINIC | Age: 72
End: 2017-02-20

## 2017-02-20 ENCOUNTER — HOSPITAL ENCOUNTER (OUTPATIENT)
Dept: LAB | Age: 72
Discharge: HOME | End: 2017-02-20
Payer: MEDICARE

## 2017-02-20 VITALS
OXYGEN SATURATION: 99 % | WEIGHT: 137 LBS | RESPIRATION RATE: 16 BRPM | TEMPERATURE: 99.2 F | HEART RATE: 83 BPM | HEIGHT: 67 IN | BODY MASS INDEX: 21.5 KG/M2 | DIASTOLIC BLOOD PRESSURE: 85 MMHG | SYSTOLIC BLOOD PRESSURE: 111 MMHG

## 2017-02-20 DIAGNOSIS — R19.7 DIARRHEA, UNSPECIFIED TYPE: ICD-10-CM

## 2017-02-20 DIAGNOSIS — R19.7 DIARRHEA, UNSPECIFIED TYPE: Primary | ICD-10-CM

## 2017-02-20 LAB
ALBUMIN SERPL BCP-MCNC: 3.6 G/DL (ref 3.4–5)
ALBUMIN/GLOB SERPL: 1.3 {RATIO} (ref 0.8–1.7)
ALP SERPL-CCNC: 132 U/L (ref 45–117)
ALT SERPL-CCNC: 19 U/L (ref 13–56)
ANION GAP BLD CALC-SCNC: 10 MMOL/L (ref 3–18)
AST SERPL W P-5'-P-CCNC: 17 U/L (ref 15–37)
BASOPHILS # BLD AUTO: 0 K/UL (ref 0–0.06)
BASOPHILS # BLD: 0 % (ref 0–2)
BILIRUB SERPL-MCNC: 0.5 MG/DL (ref 0.2–1)
BUN SERPL-MCNC: 14 MG/DL (ref 7–18)
BUN/CREAT SERPL: 16 (ref 12–20)
CALCIUM SERPL-MCNC: 9 MG/DL (ref 8.5–10.1)
CHLORIDE SERPL-SCNC: 108 MMOL/L (ref 100–108)
CO2 SERPL-SCNC: 24 MMOL/L (ref 21–32)
CREAT SERPL-MCNC: 0.88 MG/DL (ref 0.6–1.3)
DIFFERENTIAL METHOD BLD: ABNORMAL
EOSINOPHIL # BLD: 0 K/UL (ref 0–0.4)
EOSINOPHIL NFR BLD: 1 % (ref 0–5)
ERYTHROCYTE [DISTWIDTH] IN BLOOD BY AUTOMATED COUNT: 15 % (ref 11.6–14.5)
GLOBULIN SER CALC-MCNC: 2.7 G/DL (ref 2–4)
GLUCOSE SERPL-MCNC: 86 MG/DL (ref 74–99)
HCT VFR BLD AUTO: 42.3 % (ref 35–45)
HGB BLD-MCNC: 13.8 G/DL (ref 12–16)
LYMPHOCYTES # BLD AUTO: 27 % (ref 21–52)
LYMPHOCYTES # BLD: 2.2 K/UL (ref 0.9–3.6)
MCH RBC QN AUTO: 31.4 PG (ref 24–34)
MCHC RBC AUTO-ENTMCNC: 32.6 G/DL (ref 31–37)
MCV RBC AUTO: 96.1 FL (ref 74–97)
MONOCYTES # BLD: 0.8 K/UL (ref 0.05–1.2)
MONOCYTES NFR BLD AUTO: 10 % (ref 3–10)
NEUTS SEG # BLD: 5.2 K/UL (ref 1.8–8)
NEUTS SEG NFR BLD AUTO: 62 % (ref 40–73)
PLATELET # BLD AUTO: 287 K/UL (ref 135–420)
PMV BLD AUTO: 11.3 FL (ref 9.2–11.8)
POTASSIUM SERPL-SCNC: 3.8 MMOL/L (ref 3.5–5.5)
PROT SERPL-MCNC: 6.3 G/DL (ref 6.4–8.2)
RBC # BLD AUTO: 4.4 M/UL (ref 4.2–5.3)
SODIUM SERPL-SCNC: 142 MMOL/L (ref 136–145)
WBC # BLD AUTO: 8.3 K/UL (ref 4.6–13.2)

## 2017-02-20 PROCEDURE — 36415 COLL VENOUS BLD VENIPUNCTURE: CPT | Performed by: NURSE PRACTITIONER

## 2017-02-20 PROCEDURE — 85025 COMPLETE CBC W/AUTO DIFF WBC: CPT | Performed by: NURSE PRACTITIONER

## 2017-02-20 PROCEDURE — 80053 COMPREHEN METABOLIC PANEL: CPT | Performed by: NURSE PRACTITIONER

## 2017-02-20 NOTE — MR AVS SNAPSHOT
Visit Information Date & Time Provider Department Dept. Phone Encounter #  
 2/20/2017  2:00 PM Debora Leblanc, 3 [facility] [phone] Upcoming Health Maintenance Date Due Hepatitis C Screening 1945 GLAUCOMA SCREENING Q2Y 7/7/2012 MEDICARE YEARLY EXAM 7/1/2017 BREAST CANCER SCRN MAMMOGRAM 7/26/2018 COLONOSCOPY 2/19/2020 DTaP/Tdap/Td series (3 - Td) 4/22/2026 Allergies as of 2/20/2017  Review Complete On: 2/20/2017 By: Debora Leblanc NP Severity Noted Reaction Type Reactions Pcn [Penicillins]  04/30/2010    Rash Was tested is no longer allergic Current Immunizations  Reviewed on 6/30/2016 Name Date Influenza High Dose Vaccine PF 10/27/2016  9:24 AM, 10/19/2015 10:00 AM  
 Influenza Vaccine 11/18/2014 Influenza Vaccine Split 10/16/2012, 11/1/2010 Influenza Vaccine Whole 10/5/2011 PPD 9/14/2010 Pneumococcal Polysaccharide (PPSV-23) 2/24/2014, 1/1/2014 Pneumococcal Vaccine (Unspecified Type) 1/1/2007 TDAP Vaccine 4/13/2010 Td, Adsorbed PF 4/20/2010 Tdap 4/22/2016 Zoster 11/2/2009 Not reviewed this visit You Were Diagnosed With   
  
 Codes Comments Diarrhea, unspecified type    -  Primary ICD-10-CM: R19.7 ICD-9-CM: 787.91 Vitals BP Pulse Temp Resp Height(growth percentile) Weight(growth percentile) 111/85 (BP 1 Location: Left arm, BP Patient Position: Sitting) 83 99.2 °F (37.3 °C) (Oral) 16 5' 7\" (1.702 m) 137 lb (62.1 kg) SpO2 BMI OB Status Smoking Status 99% 21.46 kg/m2 Postmenopausal Never Smoker BMI and BSA Data Body Mass Index Body Surface Area  
 21.46 kg/m 2 1.71 m 2 Preferred Pharmacy Pharmacy Name Phone [id number] Kim Guzman, Neeraj Dolly Maddi [phone] Your Updated Medication List  
  
   
This list is accurate as of: 2/20/17  2:40 PM.  Always use your most recent med list.  
  
  
  
  
 ALPRAZolam 0.25 mg tablet Commonly known as:  Nga Rae Take 1 Tab by mouth two (2) times daily as needed for Anxiety. atorvastatin 20 mg tablet Commonly known as:  LIPITOR Take 1 Tab by mouth daily. CALCIUM 600 + D 600-125 mg-unit Tab Generic drug:  calcium-cholecalciferol (d3) Take  by mouth two (2) times a day. CHEW Q 100 mg Chew Generic drug:  coenzyme q10 Take  by mouth daily. clonazePAM 0.5 mg tablet Commonly known as:  Jasonyndocamille Mottaer Take  by mouth nightly as needed. cyproheptadine 4 mg tablet Commonly known as:  PERIACTIN Take 1 Tab by mouth two (2) times a day for 90 days. desonide 0.05 % cream  
Commonly known as:  Ariella Raya Dexlansoprazole 60 mg Cpdb Commonly known as:  DEXILANT Take 1 Cap by mouth daily. FLUoxetine 40 mg capsule Commonly known as:  PROzac Take 1 Cap by mouth daily. HYDROcodone-acetaminophen 7.5-325 mg per tablet Commonly known as:  Julaine Kava Take 1 Tab by mouth every eight (8) hours as needed for Pain.  
  
 levothyroxine 50 mcg tablet Commonly known as:  SYNTHROID Take 1 Tab by mouth Daily (before breakfast). magnesium Tab Take  by mouth two (2) times a day. multivitamin tablet Commonly known as:  ONE A DAY Take 1 Tab by mouth daily. naratriptan 2.5 mg Tab Commonly known as:  Navi Millerker Take 2.5 mg by mouth once as needed. sucralfate 100 mg/mL suspension Commonly known as:  Brisa Shelter Take 5 mL by mouth Before breakfast and dinner. topiramate 25 mg tablet Commonly known as:  TOPAMAX Take  by mouth three (3) times daily. traMADol 50 mg tablet Commonly known as:  ULTRAM  
Take 1 Tab by mouth every six (6) hours as needed for Pain. Max Daily Amount: 200 mg.  
  
 verapamil  mg ER capsule Commonly known as:  VERELAN  
TAKE 1 CAPSULE DAILY  
  
 VITAMIN B-2 100 mg tablet Generic drug:  riboflavin (vitamin B2) TAKE 1 TABLET BY MOUTH TWO TIMES A DAY. To-Do List   
 02/20/2017 Lab:  CBC WITH AUTOMATED DIFF   
  
 02/20/2017 Lab:  METABOLIC PANEL, COMPREHENSIVE   
  
 02/20/2017 Imaging:  XR ABD (KUB) Patient Instructions Present to gastroenterology today to schedule follow up appointment. Present to the Emergency Room if symptoms worsen. Diarrhea: Care Instructions Your Care Instructions Diarrhea is loose, watery stools (bowel movements). The exact cause is often hard to find. Sometimes diarrhea is your body's way of getting rid of what caused an upset stomach. Viruses, food poisoning, and many medicines can cause diarrhea. Some people get diarrhea in response to emotional stress, anxiety, or certain foods. Almost everyone has diarrhea now and then. It usually isn't serious, and your stools will return to normal soon. The important thing to do is replace the fluids you have lost, so you can prevent dehydration. The doctor has checked you carefully, but problems can develop later. If you notice any problems or new symptoms, get medical treatment right away. Follow-up care is a key part of your treatment and safety. Be sure to make and go to all appointments, and call your doctor if you are having problems. It's also a good idea to know your test results and keep a list of the medicines you take. How can you care for yourself at home? · Watch for signs of dehydration, which means your body has lost too much water. Dehydration is a serious condition and should be treated right away. Signs of dehydration are: 
¨ Increasing thirst and dry eyes and mouth. ¨ Feeling faint or lightheaded. ¨ Darker urine, and a smaller amount of urine than normal. 
· To prevent dehydration, drink plenty of fluids, enough so that your urine is light yellow or clear like water. Choose water and other caffeine-free clear liquids until you feel better.  If you have kidney, heart, or liver disease and have to limit fluids, talk with your doctor before you increase the amount of fluids you drink. · Begin eating small amounts of mild foods the next day, if you feel like it. ¨ Try yogurt that has live cultures of Lactobacillus. (Check the label.) ¨ Avoid spicy foods, fruits, alcohol, and caffeine until 48 hours after all symptoms are gone. ¨ Avoid chewing gum that contains sorbitol. ¨ Avoid dairy products (except for yogurt with Lactobacillus) while you have diarrhea and for 3 days after symptoms are gone. · The doctor may recommend that you take over-the-counter medicine, such as loperamide (Imodium), if you still have diarrhea after 6 hours. Read and follow all instructions on the label. Do not use this medicine if you have bloody diarrhea, a high fever, or other signs of serious illness. Call your doctor if you think you are having a problem with your medicine. When should you call for help? Call 911 anytime you think you may need emergency care. For example, call if: 
· You passed out (lost consciousness). · Your stools are maroon or very bloody. Call your doctor now or seek immediate medical care if: 
· You are dizzy or lightheaded, or you feel like you may faint. · Your stools are black and look like tar, or they have streaks of blood. · You have new or worse belly pain. · You have symptoms of dehydration, such as: ¨ Dry eyes and a dry mouth. ¨ Passing only a little dark urine. ¨ Feeling thirstier than usual. 
· You have a new or higher fever. Watch closely for changes in your health, and be sure to contact your doctor if: 
· Your diarrhea is getting worse. · You see pus in the diarrhea. · You are not getting better after 2 days (48 hours). Where can you learn more? Go to http://gilda-michael.info/. Enter F949 in the search box to learn more about \"Diarrhea: Care Instructions. \" Current as of: May 27, 2016 Content Version: 11.1 © 8331-8182 [employer]wise[employer]. Care instructions adapted under license by [employer] (which disclaims liability or warranty for this information). If you have questions about a medical condition or this instruction, always ask your healthcare professional. [address] any warranty or liability for your use of this information. Introducing [facility]! Dear Stephanie Rojas: Thank you for requesting a fl3ur account. Our records indicate that you already have an active fl3ur account. You can access your account anytime at [email or web address] Did you know that you can access your hospital and ER discharge instructions at any time in fl3ur? You can also review all of your test results from your hospital stay or ER visit. Additional Information If you have questions, please visit the Frequently Asked Questions section of the fl3ur website at [email or web address] Remember, fl3ur is NOT to be used for urgent needs. For medical emergencies, dial 911. Now available from your iPhone and Android! Please provide this summary of care documentation to your next provider. Your primary care clinician is listed as [address]. If you have any questions after today's visit, please call [phone].

## 2017-02-20 NOTE — PROGRESS NOTES
HISTORY OF PRESENT ILLNESS  Caridad Thomas is a 70 y.o. female. HPI Patient reports acute onset of watery brown/black diarrhea yesterday and then noted a large bowel movement. Diarrhea is present today and she is without symptoms. She denies urinary frequency or dysuria. She denies abdomen pain, nausea or vomiting. She is not lightheaded, dizzy or presyncopal.  No foreign travel. She has been waiting for the gastroenterology consult call to come through as her PCP ordered a GI referral.  History of rectal bleeding in the remote past and denied twice any rectal bleeding to me. She is in no pain. Symptom onset acute, timing episodic, pain none. Past Medical History   Diagnosis Date    Allergy     Anxiety associated with depression 8/2/2011    Arthritis     Colitis, ischemic (HCC)     Connective tissue disease ([facility] [address]     Degenerative disc disease     Depression      anxiety    GERD (gastroesophageal reflux disease)     Headache(784.0)      migraines    HH (hiatus hernia)     Hyperlipidemia     Hypertension     Hypovitaminosis D 10/19/2015    Other forms of migraine, without mention of intractable migraine without mention of status migrainosus     Pulmonary nodules 2/25/2010    Raynaud's syndrome     Renal cyst 2/25/2014    Rosacea        Current Outpatient Prescriptions:     cyproheptadine (PERIACTIN) 4 mg tablet, Take 1 Tab by mouth two (2) times a day for 90 days. , Disp: 60 Tab, Rfl: 2    FLUoxetine (PROZAC) 40 mg capsule, Take 1 Cap by mouth daily. , Disp: 90 Cap, Rfl: 3    verapamil ER (VERELAN) 360 mg ER capsule, TAKE 1 CAPSULE DAILY, Disp: 90 Cap, Rfl: 2    desonide (TRIDESILON) 0.05 % cream, , Disp: , Rfl: 3    clonazePAM (KLONOPIN) 0.5 mg tablet, Take  by mouth nightly as needed. , Disp: , Rfl:     VITAMIN B-2 100 mg tablet, TAKE 1 TABLET BY MOUTH TWO TIMES A DAY., Disp: 180 Tab, Rfl: 0    topiramate (TOPAMAX) 25 mg tablet, Take  by mouth three (3) times daily. , Disp: , Rfl:    naratriptan (AMERGE) 2.5 mg tab, Take 2.5 mg by mouth once as needed. , Disp: , Rfl:     levothyroxine (SYNTHROID) 50 mcg tablet, Take 1 Tab by mouth Daily (before breakfast). , Disp: 90 Tab, Rfl: 2    atorvastatin (LIPITOR) 20 mg tablet, Take 1 Tab by mouth daily. , Disp: 90 Tab, Rfl: 2    Dexlansoprazole (DEXILANT) 60 mg CpDB, Take 1 Cap by mouth daily. , Disp: 90 Cap, Rfl: 3    calcium-cholecalciferol, d3, (CALCIUM 600 + D) 600-125 mg-unit tab, Take  by mouth two (2) times a day., Disp: , Rfl:     coenzyme q10 (CHEW Q) 100 mg chew, Take  by mouth daily. , Disp: , Rfl:     multivitamin (ONE A DAY) tablet, Take 1 Tab by mouth daily. , Disp: , Rfl:     magnesium Tab, Take  by mouth two (2) times a day., Disp: , Rfl:     traMADol (ULTRAM) 50 mg tablet, Take 1 Tab by mouth every six (6) hours as needed for Pain. Max Daily Amount: 200 mg., Disp: 50 Tab, Rfl: 1    HYDROcodone-acetaminophen (NORCO) 7.5-325 mg per tablet, Take 1 Tab by mouth every eight (8) hours as needed for Pain., Disp: 40 Tab, Rfl: 0    sucralfate (CARAFATE) 100 mg/mL suspension, Take 5 mL by mouth Before breakfast and dinner., Disp: 414 mL, Rfl: 3    ALPRAZolam (XANAX) 0.25 mg tablet, Take 1 Tab by mouth two (2) times daily as needed for Anxiety. , Disp: 50 Tab, Rfl: 0      Review of Systems   Constitutional: Negative. Negative for chills, fever, malaise/fatigue and weight loss. HENT: Negative. Eyes: Negative. Respiratory: Negative. Negative for cough, shortness of breath and wheezing. Cardiovascular: Negative. Negative for chest pain, palpitations, orthopnea, claudication and leg swelling. Gastrointestinal: Positive for diarrhea. Negative for abdominal pain, blood in stool, heartburn, melena, nausea and vomiting. Genitourinary: Negative. Negative for dysuria, flank pain, frequency, hematuria and urgency. Musculoskeletal: Negative for back pain and myalgias. Skin: Negative. Neurological: Negative.   Negative for dizziness, tingling, sensory change, focal weakness and headaches. Physical Exam   Constitutional: She is oriented to person, place, and time. She appears well-developed and well-nourished. No distress. HENT:   Head: Normocephalic and atraumatic. Mouth/Throat: Oropharynx is clear and moist. No oropharyngeal exudate. Eyes: Conjunctivae and EOM are normal. Pupils are equal, round, and reactive to light. Left eye exhibits no discharge. Neck: Neck supple. No tracheal deviation present. Cardiovascular: Normal rate, regular rhythm, normal heart sounds and intact distal pulses. Exam reveals no gallop and no friction rub. No murmur heard. Pulmonary/Chest: Effort normal and breath sounds normal. No stridor. No respiratory distress. She has no wheezes. She has no rales. Abdominal: Soft. Bowel sounds are normal. She exhibits no distension. There is no tenderness. There is no rebound and no guarding. Musculoskeletal: She exhibits no edema, tenderness or deformity. Lymphadenopathy:     She has no cervical adenopathy. Neurological: She is alert and oriented to person, place, and time. She exhibits normal muscle tone. Coordination normal.   Skin: Skin is warm and dry. No rash noted. She is not diaphoretic. No erythema. No pallor. Psychiatric: She has a normal mood and affect. Her behavior is normal. Judgment and thought content normal.   Nursing note and vitals reviewed. ASSESSMENT and PLAN  1. Acute Diarrhea   -KUB   -CBC w/diff, CMP   -I was able to get an appointment with a gastroenterologist who is located within the [facility]. The patient was directed to stop off in their office and provide insurance information and to schedule an appointment.     -Present to the ED immediately if symptoms worsen. Patient is in agreement with the treatment plan. Return to the clinic if needed. All questions answered and discharge instructions reviewed with the patient.

## 2017-02-20 NOTE — PATIENT INSTRUCTIONS
Present to gastroenterology today to schedule follow up appointment. Present to the Emergency Room if symptoms worsen. Diarrhea: Care Instructions  Your Care Instructions    Diarrhea is loose, watery stools (bowel movements). The exact cause is often hard to find. Sometimes diarrhea is your body's way of getting rid of what caused an upset stomach. Viruses, food poisoning, and many medicines can cause diarrhea. Some people get diarrhea in response to emotional stress, anxiety, or certain foods. Almost everyone has diarrhea now and then. It usually isn't serious, and your stools will return to normal soon. The important thing to do is replace the fluids you have lost, so you can prevent dehydration. The doctor has checked you carefully, but problems can develop later. If you notice any problems or new symptoms, get medical treatment right away. Follow-up care is a key part of your treatment and safety. Be sure to make and go to all appointments, and call your doctor if you are having problems. It's also a good idea to know your test results and keep a list of the medicines you take. How can you care for yourself at home? · Watch for signs of dehydration, which means your body has lost too much water. Dehydration is a serious condition and should be treated right away. Signs of dehydration are:  ¨ Increasing thirst and dry eyes and mouth. ¨ Feeling faint or lightheaded. ¨ Darker urine, and a smaller amount of urine than normal.  · To prevent dehydration, drink plenty of fluids, enough so that your urine is light yellow or clear like water. Choose water and other caffeine-free clear liquids until you feel better. If you have kidney, heart, or liver disease and have to limit fluids, talk with your doctor before you increase the amount of fluids you drink. · Begin eating small amounts of mild foods the next day, if you feel like it. ¨ Try yogurt that has live cultures of Lactobacillus.  (Check the label.)  ¨ Avoid spicy foods, fruits, alcohol, and caffeine until 48 hours after all symptoms are gone. ¨ Avoid chewing gum that contains sorbitol. ¨ Avoid dairy products (except for yogurt with Lactobacillus) while you have diarrhea and for 3 days after symptoms are gone. · The doctor may recommend that you take over-the-counter medicine, such as loperamide (Imodium), if you still have diarrhea after 6 hours. Read and follow all instructions on the label. Do not use this medicine if you have bloody diarrhea, a high fever, or other signs of serious illness. Call your doctor if you think you are having a problem with your medicine. When should you call for help? Call 911 anytime you think you may need emergency care. For example, call if:  · You passed out (lost consciousness). · Your stools are maroon or very bloody. Call your doctor now or seek immediate medical care if:  · You are dizzy or lightheaded, or you feel like you may faint. · Your stools are black and look like tar, or they have streaks of blood. · You have new or worse belly pain. · You have symptoms of dehydration, such as:  ¨ Dry eyes and a dry mouth. ¨ Passing only a little dark urine. ¨ Feeling thirstier than usual.  · You have a new or higher fever. Watch closely for changes in your health, and be sure to contact your doctor if:  · Your diarrhea is getting worse. · You see pus in the diarrhea. · You are not getting better after 2 days (48 hours). Where can you learn more? Go to http://gilda-michael.info/. Enter D553 in the search box to learn more about \"Diarrhea: Care Instructions. \"  Current as of: May 27, 2016  Content Version: 11.1  © 1809-1969 Education.com. Care instructions adapted under license by [employer] (which disclaims liability or warranty for this information).  If you have questions about a medical condition or this instruction, always ask your healthcare professional. [employer] disclaims any warranty or liability for your use of this information.

## 2017-02-20 NOTE — PROGRESS NOTES
Breezy Stevens is a 70 y.o. female here today for diarrhea since yesterday afternoon. Seen blood not sure if its from rectum and urine    1. Have you been to the ER, urgent care clinic or hospitalized since your last visit? NO.     2. Have you seen or consulted any other health care providers outside of the [address] [place] [address] since your last visit (Include any pap smears or colon screening)? NO      Do you have an Advanced Directive? NO    Would you like information on Advanced Directives?  NO    Learning Assessment 2/3/2015   PRIMARY LEARNER Patient   HIGHEST LEVEL OF EDUCATION - PRIMARY LEARNER  4 YEARS OF COLLEGE   BARRIERS PRIMARY LEARNER NONE   CO-LEARNER CAREGIVER No   PRIMARY LANGUAGE ENGLISH   LEARNER PREFERENCE PRIMARY LISTENING   ANSWERED BY self   RELATIONSHIP SELF

## 2017-02-27 ENCOUNTER — TELEPHONE (OUTPATIENT)
Dept: FAMILY MEDICINE CLINIC | Age: 72
End: 2017-02-27

## 2017-02-27 NOTE — TELEPHONE ENCOUNTER
Patient call, just wanted Dr Sandy Neumann to know she is seeing Dr Mane Valentino. States he does not want to do anything right know but will do colonoscopy if needed. Dr Mane Valentino states he will follow her.

## 2017-02-28 RX ORDER — ATORVASTATIN CALCIUM 20 MG/1
TABLET, FILM COATED ORAL
Qty: 90 TAB | Refills: 1 | Status: SHIPPED | OUTPATIENT
Start: 2017-02-28 | End: 2017-11-01 | Stop reason: SDUPTHER

## 2017-03-14 ENCOUNTER — OFFICE VISIT (OUTPATIENT)
Dept: FAMILY MEDICINE CLINIC | Age: 72
End: 2017-03-14

## 2017-03-14 DIAGNOSIS — H66.93 BILATERAL OTITIS MEDIA, UNSPECIFIED CHRONICITY, UNSPECIFIED OTITIS MEDIA TYPE: Primary | ICD-10-CM

## 2017-03-14 PROBLEM — R19.7 DIARRHEA: Status: ACTIVE | Noted: 2017-03-14

## 2017-03-14 RX ORDER — LEVOFLOXACIN 500 MG/1
500 TABLET, FILM COATED ORAL DAILY
Qty: 7 TAB | Refills: 0 | Status: SHIPPED | OUTPATIENT
Start: 2017-03-14 | End: 2017-03-21

## 2017-03-14 NOTE — PROGRESS NOTES
HISTORY OF PRESENT ILLNESS  Carol Nair is a 70 y.o. female. HPI  Patient presents with left ear pain the past month. No treatment to date. Symptom onset acute, timing constant, pain mild to moderate. ROS    Physical Exam    ASSESSMENT and PLAN  1. Acute left otitis media   -Levaquin 500 mg tid x 7 days. -OTC for symptom control and comfort. Patient is in agreement with the treatment plan. Return to the clinic if needed. All questions answered and discharge instructions reviewed with the patient.

## 2017-03-15 VITALS
SYSTOLIC BLOOD PRESSURE: 138 MMHG | RESPIRATION RATE: 16 BRPM | DIASTOLIC BLOOD PRESSURE: 90 MMHG | HEIGHT: 67 IN | WEIGHT: 143 LBS | OXYGEN SATURATION: 100 % | HEART RATE: 74 BPM | BODY MASS INDEX: 22.44 KG/M2 | TEMPERATURE: 96.9 F

## 2017-03-15 RX ORDER — SENNOSIDES 25 MG/1
TABLET, FILM COATED ORAL
COMMUNITY
End: 2018-01-24 | Stop reason: ALTCHOICE

## 2017-03-15 RX ORDER — PIMECROLIMUS 10 MG/G
CREAM TOPICAL 2 TIMES DAILY
COMMUNITY
End: 2017-12-04 | Stop reason: ALTCHOICE

## 2017-03-15 NOTE — PROGRESS NOTES
Mt Weiss is a 70 y.o. female here today for ear fullness has had for a few weeks. 1. Have you been to the ER, urgent care clinic or hospitalized since your last visit? NO.     2. Have you seen or consulted any other health care providers outside of the [address] [place] [address] since your last visit (Include any pap smears or colon screening)? NO      Do you have an Advanced Directive? NO    Would you like information on Advanced Directives?  NO    Learning Assessment 2/3/2015   PRIMARY LEARNER Patient   HIGHEST LEVEL OF EDUCATION - PRIMARY LEARNER  4 YEARS OF COLLEGE   BARRIERS PRIMARY LEARNER NONE   CO-LEARNER CAREGIVER No   PRIMARY LANGUAGE ENGLISH   LEARNER PREFERENCE PRIMARY LISTENING   ANSWERED BY self   RELATIONSHIP SELF

## 2017-03-16 NOTE — PROGRESS NOTES
HISTORY OF PRESENT ILLNESS  Marilyn Bach is a 70 y.o. female. HPI Patient reports bilateral ear pain the past few weeks she thinks is related to inner ear congestion. No treatment to date. Symptom onset gradual, timing constant, pain moderate. Past Medical History:   Diagnosis Date    Allergy     Anxiety associated with depression 8/2/2011    Arthritis     Colitis, ischemic (HCC)     Connective tissue disease ([address]     Degenerative disc disease     Depression     anxiety    GERD (gastroesophageal reflux disease)     Headache(784.0)     migraines    HH (hiatus hernia)     Hyperlipidemia     Hypertension     Hypovitaminosis D 10/19/2015    Other forms of migraine, without mention of intractable migraine without mention of status migrainosus     Pulmonary nodules 2/25/2010    Raynaud's syndrome     Renal cyst 2/25/2014    Rosacea        Current Outpatient Prescriptions:     lidocaine 5 % topical cream, Apply  to affected area two (2) times daily as needed for Itching., Disp: , Rfl:     pimecrolimus (ELIDEL) 1 % topical cream, Apply  to affected area two (2) times a day., Disp: , Rfl:     VITAMIN B-2 100 mg tablet, TAKE 1 TABLET BY MOUTH TWO TIMES A DAY, Disp: 180 Tab, Rfl: 2    atorvastatin (LIPITOR) 20 mg tablet, TAKE 1 TABLET (20 MG) BY ORAL ROUTE ONCE DAILY, Disp: 90 Tab, Rfl: 1    FLUoxetine (PROZAC) 40 mg capsule, Take 1 Cap by mouth daily. , Disp: 90 Cap, Rfl: 3    verapamil ER (VERELAN) 360 mg ER capsule, TAKE 1 CAPSULE DAILY, Disp: 90 Cap, Rfl: 2    topiramate (TOPAMAX) 25 mg tablet, Take  by mouth three (3) times daily. , Disp: , Rfl:     naratriptan (AMERGE) 2.5 mg tab, Take 2.5 mg by mouth once as needed. , Disp: , Rfl:     levothyroxine (SYNTHROID) 50 mcg tablet, Take 1 Tab by mouth Daily (before breakfast). , Disp: 90 Tab, Rfl: 2    Dexlansoprazole (DEXILANT) 60 mg CpDB, Take 1 Cap by mouth daily. , Disp: 90 Cap, Rfl: 3    calcium-cholecalciferol, d3, (CALCIUM 600 + D) 600-125 mg-unit tab, Take  by mouth two (2) times a day., Disp: , Rfl:     coenzyme q10 (CHEW Q) 100 mg chew, Take  by mouth daily. , Disp: , Rfl:     multivitamin (ONE A DAY) tablet, Take 1 Tab by mouth daily. , Disp: , Rfl:     magnesium Tab, Take  by mouth two (2) times a day., Disp: , Rfl:     levoFLOXacin (LEVAQUIN) 500 mg tablet, Take 1 Tab by mouth daily for 7 days. , Disp: 7 Tab, Rfl: 0    cyproheptadine (PERIACTIN) 4 mg tablet, Take 1 Tab by mouth two (2) times a day for 90 days. , Disp: 60 Tab, Rfl: 2    traMADol (ULTRAM) 50 mg tablet, Take 1 Tab by mouth every six (6) hours as needed for Pain. Max Daily Amount: 200 mg., Disp: 50 Tab, Rfl: 1    HYDROcodone-acetaminophen (NORCO) 7.5-325 mg per tablet, Take 1 Tab by mouth every eight (8) hours as needed for Pain., Disp: 40 Tab, Rfl: 0    desonide (TRIDESILON) 0.05 % cream, , Disp: , Rfl: 3    clonazePAM (KLONOPIN) 0.5 mg tablet, Take  by mouth nightly as needed. , Disp: , Rfl:     sucralfate (CARAFATE) 100 mg/mL suspension, Take 5 mL by mouth Before breakfast and dinner., Disp: 414 mL, Rfl: 3    ALPRAZolam (XANAX) 0.25 mg tablet, Take 1 Tab by mouth two (2) times daily as needed for Anxiety. , Disp: 50 Tab, Rfl: 0    Review of Systems   Constitutional: Positive for malaise/fatigue. Negative for diaphoresis, fever and weight loss. Fatigue related to pain. HENT: Negative. Negative for congestion, ear discharge and sore throat. Eyes: Negative. Negative for pain, discharge and redness. Respiratory: Negative. Cardiovascular: Negative. Gastrointestinal: Negative. Musculoskeletal: Negative. Skin: Negative. Negative for itching and rash. Neurological: Negative for weakness and headaches. Physical Exam   Constitutional: She is oriented to person, place, and time. She appears well-developed and well-nourished. No distress. HENT:   Head: Normocephalic and atraumatic.    Nose: Nose normal.   Mouth/Throat: Oropharynx is clear and moist. No oropharyngeal exudate. TMs red bilaterally and retracted. Eyes: EOM are normal. Pupils are equal, round, and reactive to light. Right eye exhibits no discharge. Left eye exhibits no discharge. No scleral icterus. Neck: Neck supple. No tracheal deviation present. Cardiovascular: Normal rate, regular rhythm, normal heart sounds and intact distal pulses. Exam reveals no gallop and no friction rub. No murmur heard. Pulmonary/Chest: Effort normal and breath sounds normal. No stridor. No respiratory distress. She has no wheezes. She has no rales. Abdominal: Soft. Bowel sounds are normal. She exhibits no distension. There is no tenderness. There is no rebound and no guarding. Musculoskeletal: She exhibits no edema, tenderness or deformity. Neurological: She is alert and oriented to person, place, and time. No cranial nerve deficit. She exhibits normal muscle tone. Coordination normal.   Skin: Skin is warm and dry. No rash noted. She is not diaphoretic. No erythema. No pallor. Psychiatric: She has a normal mood and affect. Her behavior is normal. Judgment and thought content normal.   Nursing note and vitals reviewed. ASSESSMENT and PLAN  1. Bilateral otitis media   -Levaquin 500 mg daily x 7 days. -OTC for comfort. Patient is in agreement with the treatment plan. Return to the clinic if needed. All questions answered and discharge instructions reviewed with the patient.

## 2017-03-20 ENCOUNTER — OFFICE VISIT (OUTPATIENT)
Dept: FAMILY MEDICINE CLINIC | Age: 72
End: 2017-03-20

## 2017-03-20 VITALS
DIASTOLIC BLOOD PRESSURE: 70 MMHG | SYSTOLIC BLOOD PRESSURE: 119 MMHG | WEIGHT: 143 LBS | BODY MASS INDEX: 22.44 KG/M2 | OXYGEN SATURATION: 100 % | HEIGHT: 67 IN | TEMPERATURE: 96.4 F | HEART RATE: 72 BPM | RESPIRATION RATE: 16 BRPM

## 2017-03-20 DIAGNOSIS — J30.1 NON-SEASONAL ALLERGIC RHINITIS DUE TO POLLEN: ICD-10-CM

## 2017-03-20 DIAGNOSIS — H92.02 OTALGIA, LEFT: ICD-10-CM

## 2017-03-20 DIAGNOSIS — Z76.0 ENCOUNTER FOR MEDICATION REFILL: ICD-10-CM

## 2017-03-20 DIAGNOSIS — I10 ESSENTIAL HYPERTENSION: Primary | ICD-10-CM

## 2017-03-20 RX ORDER — VERAPAMIL HYDROCHLORIDE 360 MG/1
360 CAPSULE, DELAYED RELEASE PELLETS ORAL DAILY
Qty: 30 CAP | Refills: 0 | Status: SHIPPED | OUTPATIENT
Start: 2017-03-20 | End: 2017-04-19

## 2017-03-20 RX ORDER — FLUTICASONE PROPIONATE 50 MCG
SPRAY, SUSPENSION (ML) NASAL
Qty: 1 BOTTLE | Refills: 3 | Status: SHIPPED | OUTPATIENT
Start: 2017-03-20 | End: 2017-12-04 | Stop reason: ALTCHOICE

## 2017-03-20 RX ORDER — VERAPAMIL HYDROCHLORIDE 360 MG/1
360 CAPSULE, DELAYED RELEASE PELLETS ORAL DAILY
Qty: 90 CAP | Refills: 3 | Status: SHIPPED | OUTPATIENT
Start: 2017-03-20 | End: 2017-06-18

## 2017-03-20 RX ORDER — VERAPAMIL HYDROCHLORIDE 360 MG/1
360 CAPSULE, DELAYED RELEASE PELLETS ORAL DAILY
Qty: 90 CAP | Refills: 3 | Status: SHIPPED | OUTPATIENT
Start: 2017-03-20 | End: 2017-03-20 | Stop reason: SDUPTHER

## 2017-03-20 NOTE — MR AVS SNAPSHOT
Visit Information Date & Time Provider Department Dept. Phone Encounter #  
 3/20/2017 10:45 AM Sybil Arteaga, [facility] [phone] [id number] Your Appointments 6/7/2017  1:30 PM  
Office Visit with Anastacio Kramer MD  
[address] [facility]) Appt Note: .  
 [address] [facility] [address] [facility] [address] [phone]  
  
   
 [address] [place] [address] 280 [facility] Upcoming Health Maintenance Date Due Hepatitis C Screening 1945 GLAUCOMA SCREENING Q2Y 7/7/2012 MEDICARE YEARLY EXAM 7/1/2017 BREAST CANCER SCRN MAMMOGRAM 7/26/2018 COLONOSCOPY 2/19/2020 DTaP/Tdap/Td series (3 - Td) 4/22/2026 Allergies as of 3/20/2017  Review Complete On: 3/16/2017 By: Sybil Arteaga NP Severity Noted Reaction Type Reactions Pcn [Penicillins]  04/30/2010    Rash Was tested is no longer allergic Current Immunizations  Reviewed on 6/30/2016 Name Date Influenza High Dose Vaccine PF 10/27/2016  9:24 AM, 10/19/2015 10:00 AM  
 Influenza Vaccine 11/18/2014 Influenza Vaccine Split 10/16/2012, 11/1/2010 Influenza Vaccine Whole 10/5/2011 PPD 9/14/2010 Pneumococcal Polysaccharide (PPSV-23) 2/24/2014, 1/1/2014 Pneumococcal Vaccine (Unspecified Type) 1/1/2007 TDAP Vaccine 4/13/2010 Td, Adsorbed PF 4/20/2010 Tdap 4/22/2016 Zoster 11/2/2009 Not reviewed this visit You Were Diagnosed With   
  
 Codes Comments Essential hypertension    -  Primary ICD-10-CM: I10 
ICD-9-CM: 401.9 Encounter for medication refill     ICD-10-CM: Z76.0 ICD-9-CM: V68.1 Non-seasonal allergic rhinitis due to pollen     ICD-10-CM: J30.1 ICD-9-CM: 477.0 Otalgia, left     ICD-10-CM: H92.02 
ICD-9-CM: 388.70 Vitals BP Pulse Temp Resp Height(growth percentile) Weight(growth percentile) 119/70 (BP 1 Location: Left arm, BP Patient Position: Sitting) 72 96.4 °F (35.8 °C) (Oral) 16 5' 7\" (1.702 m) 143 lb (64.9 kg) SpO2 BMI OB Status Smoking Status 100% 22.4 kg/m2 Postmenopausal Never Smoker Vitals History BMI and BSA Data Body Mass Index Body Surface Area  
 22.4 kg/m 2 1.75 m 2 Preferred Pharmacy Pharmacy Name Phone [address] [facility] [phone] Your Updated Medication List  
  
   
This list is accurate as of: 3/20/17 11:08 AM.  Always use your most recent med list.  
  
  
  
  
 ALPRAZolam 0.25 mg tablet Commonly known as:  Rina Furnace Take 1 Tab by mouth two (2) times daily as needed for Anxiety. atorvastatin 20 mg tablet Commonly known as:  LIPITOR  
TAKE 1 TABLET (20 MG) BY ORAL ROUTE ONCE DAILY CALCIUM 600 + D 600-125 mg-unit Tab Generic drug:  calcium-cholecalciferol (d3) Take  by mouth two (2) times a day. CHEW Q 100 mg Chew Generic drug:  coenzyme q10 Take  by mouth daily. clonazePAM 0.5 mg tablet Commonly known as:  Mara Cos Take  by mouth nightly as needed. cyproheptadine 4 mg tablet Commonly known as:  PERIACTIN Take 1 Tab by mouth two (2) times a day for 90 days. desonide 0.05 % cream  
Commonly known as:  Montrell Moan Dexlansoprazole 60 mg Cpdb Commonly known as:  DEXILANT Take 1 Cap by mouth daily. ELIDEL 1 % topical cream  
Generic drug:  pimecrolimus Apply  to affected area two (2) times a day. FLUoxetine 40 mg capsule Commonly known as:  PROzac Take 1 Cap by mouth daily. fluticasone 50 mcg/actuation nasal spray Commonly known as:  FLONASE  
2 sprays in each nostril daily HYDROcodone-acetaminophen 7.5-325 mg per tablet Commonly known as:  Manasa Petri Take 1 Tab by mouth every eight (8) hours as needed for Pain.  
  
 levoFLOXacin 500 mg tablet Commonly known as:  Delroy Barajas  
 Take 1 Tab by mouth daily for 7 days. levothyroxine 50 mcg tablet Commonly known as:  SYNTHROID Take 1 Tab by mouth Daily (before breakfast). lidocaine 5 % topical cream  
Apply  to affected area two (2) times daily as needed for Itching.  
  
 magnesium Tab Take  by mouth two (2) times a day. multivitamin tablet Commonly known as:  ONE A DAY Take 1 Tab by mouth daily. naratriptan 2.5 mg Tab Commonly known as:  Merrirossana Sylvie Take 2.5 mg by mouth once as needed. sucralfate 100 mg/mL suspension Commonly known as:  Clayton Stalling Take 5 mL by mouth Before breakfast and dinner. topiramate 25 mg tablet Commonly known as:  TOPAMAX Take  by mouth three (3) times daily. traMADol 50 mg tablet Commonly known as:  ULTRAM  
Take 1 Tab by mouth every six (6) hours as needed for Pain. Max Daily Amount: 200 mg.  
  
 * verapamil  mg ER capsule Commonly known as:  Fernandoada Hemingford Take 1 Cap by mouth daily for 30 days. Indications: hypertension * verapamil  mg ER capsule Commonly known as:  Freada Hemingford Take 1 Cap by mouth daily for 90 days. VITAMIN B-2 100 mg tablet Generic drug:  riboflavin (vitamin B2) TAKE 1 TABLET BY MOUTH TWO TIMES A DAY * Notice: This list has 2 medication(s) that are the same as other medications prescribed for you. Read the directions carefully, and ask your doctor or other care provider to review them with you. Prescriptions Printed Refills  
 verapamil ER (VERELAN) 360 mg ER capsule 0 Sig: Take 1 Cap by mouth daily for 30 days. Indications: hypertension Class: Print Route: Oral  
  
Prescriptions Sent to Pharmacy Refills  
 fluticasone (FLONASE) 50 mcg/actuation nasal spray 3 Si sprays in each nostril daily  Class: Normal  
 Pharmacy: [address] [facility], [address] [place] [address] [facility] #: [phone]  
 verapamil ER (VERELAN) 360 mg ER capsule 3  
 Sig: Take 1 Cap by mouth daily for 90 days. Class: Normal  
 Pharmacy: [address], [address] [place] [address] #: [phone] Route: Oral  
  
Patient Instructions Allergies: Care Instructions Your Care Instructions Allergies occur when your body's defense system (immune system) overreacts to certain substances. The immune system treats a harmless substance as if it were a harmful germ or virus. Many things can cause this overreaction, including pollens, medicine, food, dust, animal dander, and mold. Allergies can be mild or severe. Mild allergies can be managed with home treatment. But medicine may be needed to prevent problems. Managing your allergies is an important part of staying healthy. Your doctor may suggest that you have allergy testing to help find out what is causing your allergies. When you know what things trigger your symptoms, you can avoid them. This can prevent allergy symptoms and other health problems. For severe allergies that cause reactions that affect your whole body (anaphylactic reactions), your doctor may prescribe a shot of epinephrine to carry with you in case you have a severe reaction. Learn how to give yourself the shot and keep it with you at all times. Make sure it is not . Follow-up care is a key part of your treatment and safety. Be sure to make and go to all appointments, and call your doctor if you are having problems. It's also a good idea to know your test results and keep a list of the medicines you take. How can you care for yourself at home? · If you have been told by your doctor that dust or dust mites are causing your allergy, decrease the dust around your bed: 
[facility] sheets, pillowcases, and other bedding in hot water every week. ¨ Use dust-proof covers for pillows, duvets, and mattresses. Avoid plastic covers because they tear easily and do not \"breathe. \" Wash as instructed on the label. ¨ Do not use any blankets and pillows that you do not need. ¨ Use blankets that you can wash in your washing machine. ¨ Consider removing drapes and carpets, which attract and hold dust, from your bedroom. · If you are allergic to house dust and mites, do not use home humidifiers. Your doctor can suggest ways you can control dust and mites. · Look for signs of cockroaches. Cockroaches cause allergic reactions. Use cockroach baits to get rid of them. Then, clean your home well. Cockroaches like areas where grocery bags, newspapers, empty bottles, or cardboard boxes are stored. Do not keep these inside your home, and keep trash and food containers sealed. Seal off any spots where cockroaches might enter your home. · If you are allergic to mold, get rid of furniture, rugs, and drapes that smell musty. Check for mold in the bathroom. · If you are allergic to outdoor pollen or mold spores, use air-conditioning. Change or clean all filters every month. Keep windows closed. · If you are allergic to pollen, stay inside when pollen counts are high. Use a vacuum  with a HEPA filter or a double-thickness filter at least two times each week. · Stay inside when air pollution is bad. Avoid paint fumes, perfumes, and other strong odors. · Avoid conditions that make your allergies worse. Stay away from smoke. Do not smoke or let anyone else smoke in your house. Do not use fireplaces or wood-burning stoves. · If you are allergic to your pets, change the air filter in your furnace every month. Use high-efficiency filters. · If you are allergic to pet dander, keep pets outside or out of your bedroom. Old carpet and cloth furniture can hold a lot of animal dander. You may need to replace them. When should you call for help? Give an epinephrine shot if: 
· You think you are having a severe allergic reaction. · You have symptoms in more than one body area, such as mild nausea and an itchy mouth. After giving an epinephrine shot call 911, even if you feel better. Call 911 if: 
· You have symptoms of a severe allergic reaction. These may include: 
¨ Sudden raised, red areas (hives) all over your body. ¨ Swelling of the throat, mouth, lips, or tongue. ¨ Trouble breathing. ¨ Passing out (losing consciousness). Or you may feel very lightheaded or suddenly feel weak, confused, or restless. · You have been given an epinephrine shot, even if you feel better. Call your doctor now or seek immediate medical care if: 
· You have symptoms of an allergic reaction, such as: ¨ A rash or hives (raised, red areas on the skin). ¨ Itching. ¨ Swelling. ¨ Belly pain, nausea, or vomiting. Watch closely for changes in your health, and be sure to contact your doctor if: 
· You do not get better as expected. Where can you learn more? Go to http://gilda-michael.info/. Enter K627 in the search box to learn more about \"Allergies: Care Instructions. \" Current as of: February 12, 2016 Content Version: 11.1 © 6875-2092 [employer]. Care instructions adapted under license by [employer] (which disclaims liability or warranty for this information). If you have questions about a medical condition or this instruction, always ask your healthcare professional. [address] any warranty or liability for your use of this information. Introducing [facility]! Dear Mikayla Koehler: Thank you for requesting a YogiPlay account. Our records indicate that you already have an active YogiPlay account. You can access your account anytime at [email or web address] Did you know that you can access your hospital and ER discharge instructions at any time in YogiPlay? You can also review all of your test results from your hospital stay or ER visit. Additional Information If you have questions, please visit the Frequently Asked Questions section of the [employer] website at [email or web address] Remember, Machinima is NOT to be used for urgent needs. For medical emergencies, dial 911. Now available from your iPhone and Android! Please provide this summary of care documentation to your next provider. Your primary care clinician is listed as Ananth 51. If you have any questions after today's visit, please call [phone].

## 2017-03-20 NOTE — PROGRESS NOTES
HISTORY OF PRESENT ILLNESS  Anne Soni is a 70 y.o. female. HPI Patient presents with 2 problems:  1. Continued left ear pain. No right ear pain. Admits to sinus congestion and post nasal drip for 2 months. 2.  Request refill for Verapamil for htn control. She is tolerating this drug and is without side effects. Symptom onset gradual, timing constant, pain mild to moderate. Past Medical History:   Diagnosis Date    Allergy     Anxiety associated with depression 8/2/2011    Arthritis     Colitis, ischemic (HCC)     Connective tissue disease ([facility] [address]     Degenerative disc disease     Depression     anxiety    GERD (gastroesophageal reflux disease)     Headache(784.0)     migraines    HH (hiatus hernia)     Hyperlipidemia     Hypertension     Hypovitaminosis D 10/19/2015    Other forms of migraine, without mention of intractable migraine without mention of status migrainosus     Pulmonary nodules 2/25/2010    Raynaud's syndrome     Renal cyst 2/25/2014    Rosacea        Current Outpatient Prescriptions:     verapamil ER (VERELAN) 360 mg ER capsule, Take 1 Cap by mouth daily for 30 days. Indications: hypertension, Disp: 30 Cap, Rfl: 0    fluticasone (FLONASE) 50 mcg/actuation nasal spray, 2 sprays in each nostril daily, Disp: 1 Bottle, Rfl: 3    verapamil ER (VERELAN) 360 mg ER capsule, Take 1 Cap by mouth daily for 90 days. , Disp: 90 Cap, Rfl: 3    lidocaine 5 % topical cream, Apply  to affected area two (2) times daily as needed for Itching., Disp: , Rfl:     pimecrolimus (ELIDEL) 1 % topical cream, Apply  to affected area two (2) times a day., Disp: , Rfl:     levoFLOXacin (LEVAQUIN) 500 mg tablet, Take 1 Tab by mouth daily for 7 days. , Disp: 7 Tab, Rfl: 0    VITAMIN B-2 100 mg tablet, TAKE 1 TABLET BY MOUTH TWO TIMES A DAY, Disp: 180 Tab, Rfl: 2    atorvastatin (LIPITOR) 20 mg tablet, TAKE 1 TABLET (20 MG) BY ORAL ROUTE ONCE DAILY, Disp: 90 Tab, Rfl: 1    cyproheptadine (PERIACTIN) 4 mg tablet, Take 1 Tab by mouth two (2) times a day for 90 days. , Disp: 60 Tab, Rfl: 2    traMADol (ULTRAM) 50 mg tablet, Take 1 Tab by mouth every six (6) hours as needed for Pain. Max Daily Amount: 200 mg., Disp: 50 Tab, Rfl: 1    HYDROcodone-acetaminophen (NORCO) 7.5-325 mg per tablet, Take 1 Tab by mouth every eight (8) hours as needed for Pain., Disp: 40 Tab, Rfl: 0    FLUoxetine (PROZAC) 40 mg capsule, Take 1 Cap by mouth daily. , Disp: 90 Cap, Rfl: 3    desonide (TRIDESILON) 0.05 % cream, , Disp: , Rfl: 3    clonazePAM (KLONOPIN) 0.5 mg tablet, Take  by mouth nightly as needed. , Disp: , Rfl:     sucralfate (CARAFATE) 100 mg/mL suspension, Take 5 mL by mouth Before breakfast and dinner., Disp: 414 mL, Rfl: 3    topiramate (TOPAMAX) 25 mg tablet, Take  by mouth three (3) times daily. , Disp: , Rfl:     naratriptan (AMERGE) 2.5 mg tab, Take 2.5 mg by mouth once as needed. , Disp: , Rfl:     ALPRAZolam (XANAX) 0.25 mg tablet, Take 1 Tab by mouth two (2) times daily as needed for Anxiety. , Disp: 50 Tab, Rfl: 0    levothyroxine (SYNTHROID) 50 mcg tablet, Take 1 Tab by mouth Daily (before breakfast). , Disp: 90 Tab, Rfl: 2    Dexlansoprazole (DEXILANT) 60 mg CpDB, Take 1 Cap by mouth daily. , Disp: 90 Cap, Rfl: 3    calcium-cholecalciferol, d3, (CALCIUM 600 + D) 600-125 mg-unit tab, Take  by mouth two (2) times a day., Disp: , Rfl:     coenzyme q10 (CHEW Q) 100 mg chew, Take  by mouth daily. , Disp: , Rfl:     multivitamin (ONE A DAY) tablet, Take 1 Tab by mouth daily. , Disp: , Rfl:     magnesium Tab, Take  by mouth two (2) times a day., Disp: , Rfl:     Review of Systems   Constitutional: Negative. Negative for diaphoresis, fever, malaise/fatigue and weight loss. HENT: Positive for congestion and ear pain. Negative for ear discharge, nosebleeds, sore throat and tinnitus. Eyes: Negative. Negative for pain and discharge. Respiratory: Negative.   Negative for hemoptysis, sputum production, shortness of breath, wheezing and stridor. Cardiovascular: Negative. Negative for chest pain, palpitations and orthopnea. Gastrointestinal: Negative. Negative for abdominal pain, heartburn, nausea and vomiting. Musculoskeletal: Negative. Negative for back pain, falls, joint pain, myalgias and neck pain. Skin: Negative. Negative for itching and rash. Neurological: Negative. Negative for dizziness, focal weakness and headaches. Physical Exam   Constitutional: She is oriented to person, place, and time. She appears well-developed and well-nourished. No distress. Nontoxic appearance. Voice of normal quality speaking in 6-8 word sentences. HENT:   Head: Normocephalic and atraumatic. Right Ear: External ear normal.   Mouth/Throat: Oropharynx is clear and moist. No oropharyngeal exudate. Left TM black, with serous fluid. No retraction. Eyes: EOM are normal. Pupils are equal, round, and reactive to light. Right eye exhibits no discharge. Left eye exhibits no discharge. No scleral icterus. Sclera red, conjunctiva injected. Neck: Neck supple. No tracheal deviation present. Cardiovascular: Normal rate, regular rhythm, normal heart sounds and intact distal pulses. Exam reveals no gallop and no friction rub. No murmur heard. Pulmonary/Chest: Effort normal and breath sounds normal. No stridor. No respiratory distress. She has no wheezes. She has no rales. Abdominal: Soft. Bowel sounds are normal. She exhibits no distension. There is no tenderness. There is no rebound and no guarding. Musculoskeletal: She exhibits no edema, tenderness or deformity. Lymphadenopathy:     She has no cervical adenopathy. Neurological: She is alert and oriented to person, place, and time. No cranial nerve deficit. She exhibits normal muscle tone. Coordination normal.   Skin: Skin is warm and dry. No rash noted. She is not diaphoretic. No erythema. No pallor.    Psychiatric: She has a normal mood and affect. Her behavior is normal. Judgment and thought content normal.   Nursing note and vitals reviewed. ASSESSMENT and PLAN  1. Allergic rhinitis  2. Encounter for medication refill   -Flonase rx: use as directed. Recommended OTC Allegra or Zyrtec. 3. Hypertension; controlled with present rx therapy. -Refilled Verapamil  mg daily x 1 year. Patient is in agreement with the treatment plan. Return to the clinic if needed. All questions answered and discharge instructions reviewed with the patient.

## 2017-03-20 NOTE — PATIENT INSTRUCTIONS

## 2017-04-25 NOTE — TELEPHONE ENCOUNTER
Pt requesting RX refill(s) for:  Requested Prescriptions     Pending Prescriptions Disp Refills    levothyroxine (SYNTHROID) 50 mcg tablet 90 Tab 2     Sig: Take 1 Tab by mouth Daily (before breakfast).      Last refill: 07/27/16    Last visit: 02/15/17      Thank you    Martínez Clancy LPN

## 2017-04-26 RX ORDER — LEVOTHYROXINE SODIUM 50 UG/1
50 TABLET ORAL
Qty: 90 TAB | Refills: 2 | Status: SHIPPED | OUTPATIENT
Start: 2017-04-26 | End: 2018-01-24 | Stop reason: SDUPTHER

## 2017-06-07 ENCOUNTER — OFFICE VISIT (OUTPATIENT)
Dept: FAMILY MEDICINE CLINIC | Age: 72
End: 2017-06-07

## 2017-06-07 VITALS
RESPIRATION RATE: 16 BRPM | DIASTOLIC BLOOD PRESSURE: 110 MMHG | TEMPERATURE: 98.1 F | HEART RATE: 65 BPM | WEIGHT: 142 LBS | BODY MASS INDEX: 22.29 KG/M2 | SYSTOLIC BLOOD PRESSURE: 160 MMHG | OXYGEN SATURATION: 98 % | HEIGHT: 67 IN

## 2017-06-07 DIAGNOSIS — Z13.6 SCREENING FOR ISCHEMIC HEART DISEASE: ICD-10-CM

## 2017-06-07 DIAGNOSIS — Z13.39 SCREENING FOR ALCOHOLISM: ICD-10-CM

## 2017-06-07 DIAGNOSIS — Z13.1 SCREENING FOR DIABETES MELLITUS: ICD-10-CM

## 2017-06-07 DIAGNOSIS — M85.80 OSTEOPENIA, UNSPECIFIED LOCATION: ICD-10-CM

## 2017-06-07 DIAGNOSIS — Z12.39 BREAST CANCER SCREENING: ICD-10-CM

## 2017-06-07 DIAGNOSIS — Z00.00 ROUTINE GENERAL MEDICAL EXAMINATION AT A HEALTH CARE FACILITY: Primary | ICD-10-CM

## 2017-06-07 NOTE — PATIENT INSTRUCTIONS
Medicare Part B Preventive Services Limitations Recommendation Scheduled   Bone Mass Measurement  (age 72 & older, biennial) Requires diagnosis related to osteoporosis or estrogen deficiency. Biennial benefit unless patient has history of long-term glucocorticoid tx or baseline is needed because initial test was by other method     Cardiovascular Screening Blood Tests (every 5 years)  Total cholesterol, HDL, Triglycerides Order as a panel if possible     Colorectal Cancer Screening  -Fecal occult blood test (annual)  -Flexible sigmoidoscopy (5y)  -Screening colonoscopy (10y)  -Barium Enema      Counseling to Prevent Tobacco Use (up to 8 sessions per year)  - Counseling greater than 3 and up to 10 minutes  - Counseling greater than 10 minutes Patients must be asymptomatic of tobacco-related conditions to receive as preventive service     Diabetes Screening Tests (at least every 3 years, Medicare covers annually or at 6-month intervals for prediabetic patients)    Fasting blood sugar (FBS) or glucose tolerance test (GTT) Patient must be diagnosed with one of the following:  -Hypertension, Dyslipidemia, obesity, previous impaired FBS or GTT  Or any two of the following: overweight, FH of diabetes, age ? 72, history of gestational diabetes, birth of baby weighing more than 9 pounds     Diabetes Self-Management Training (DSMT) (no USPSTF recommendation) Requires referral by treating physician for patient with diabetes or renal disease. 10 hours of initial DSMT session of no less than 30 minutes each in a continuous 12-month period. 2 hours of follow-up DSMT in subsequent years.      Glaucoma Screening (no USPSTF recommendation) Diabetes mellitus, family history, , age 48 or over,  American, age 72 or over     Human Immunodeficiency Virus (HIV) Screening (annually for increased risk patients)  HIV-1 and HIV-2 by EIA, ELMER, rapid antibody test, or oral mucosa transudate Patient must be at increased risk for HIV infection per USPSTF guidelines or pregnant. Tests covered annually for patients at increased risk. Pregnant patients may receive up to 3 test during pregnancy. Medical Nutrition Therapy (MNT) (for diabetes or renal disease not recommended schedule) Requires referral by treating physician for patient with diabetes or renal disease. Can be provided in same year as diabetes self-management training (DSMT), and CMS recommends medical nutrition therapy take place after DSMT. Up to 3 hours for initial year and 2 hours in subsequent years. Shingles Vaccination A shingles vaccine is also recommended once in a lifetime after age 61     Seasonal Influenza Vaccination (annually)      Pneumococcal Vaccination (once after 72)      Hepatitis B Vaccinations (if medium/high risk) Medium/high risk factors:  End-stage renal disease,  Hemophiliacs who received Factor VIII or IX concentrates, Clients of institutions for the mentally retarded, Persons who live in the same house as a HepB virus carrier, Homosexual men, Illicit injectable drug abusers. Screening Mammography (biennial age 54-69) Annually (age 36 or over)     Screening Pap Tests and Pelvic Examination (up to age 79 and after 79 if unknown history or abnormal study last 10 years) Every 25 months except high risk     Ultrasound Screening for Abdominal Aortic Aneurysm (AAA) (once) Patient must be referred through [facility] and not have had a screening for abdominal aortic aneurysm before under Medicare.   Limited to patients who meet one of the following criteria:  - Men who are 73-68 years old and have smoked more than 100 cigarettes in their lifetime.  -Anyone with a FH of AAA  -Anyone recommended for screening by USPSTF

## 2017-06-07 NOTE — PROGRESS NOTES
This is a Subsequent Medicare Annual Wellness Visit providing Personalized Prevention Plan Services (PPPS) (Performed 12 months after initial AWV and PPPS )    I have reviewed the patient's medical history in detail and updated the computerized patient record. History     Past Medical History:   Diagnosis Date    Allergy     Anxiety associated with depression 8/2/2011    Arthritis     Colitis, ischemic (HCC)     Connective tissue disease ([facility] [address]     Degenerative disc disease     Depression     anxiety    GERD (gastroesophageal reflux disease)     Headache     migraines    HH (hiatus hernia)     Hyperlipidemia     Hypertension     Hypovitaminosis D 10/19/2015    Osteopenia 6/7/2017    Other forms of migraine, without mention of intractable migraine without mention of status migrainosus     Pulmonary nodules 2/25/2010    Raynaud's syndrome     Renal cyst 2/25/2014    Rosacea       Past Surgical History:   Procedure Laterality Date    CARDIAC SURG PROCEDURE UNLIST      cardiac cath    HX BUNIONECTOMY      HX CARPAL TUNNEL RELEASE      right    HX CERVICAL FUSION  1987    C5-6    HX KNEE ARTHROSCOPY      RIGHT    HX LUMBAR FUSION  1988 &1997    L5-S1 surgical repair    HX MOHS PROCEDURES      HX OTHER SURGICAL Left 1-7-16    Dr Daniella Yancey  General foot surgery     HX OTHER SURGICAL  1-2016    foot surgery    TOTAL KNEE ARTHROPLASTY  10/2011    right knee     Current Outpatient Prescriptions   Medication Sig Dispense Refill    levothyroxine (SYNTHROID) 50 mcg tablet Take 1 Tab by mouth Daily (before breakfast). 90 Tab 2    verapamil ER (VERELAN) 360 mg ER capsule Take 1 Cap by mouth daily for 90 days. 90 Cap 3    lidocaine 5 % topical cream Apply  to affected area two (2) times daily as needed for Itching.       VITAMIN B-2 100 mg tablet TAKE 1 TABLET BY MOUTH TWO TIMES A  Tab 2    atorvastatin (LIPITOR) 20 mg tablet TAKE 1 TABLET (20 MG) BY ORAL ROUTE ONCE DAILY 90 Tab 1    FLUoxetine (PROZAC) 40 mg capsule Take 1 Cap by mouth daily. 90 Cap 3    desonide (TRIDESILON) 0.05 % cream   3    clonazePAM (KLONOPIN) 0.5 mg tablet Take  by mouth nightly as needed.  naratriptan (AMERGE) 2.5 mg tab Take 2.5 mg by mouth once as needed.  Dexlansoprazole (DEXILANT) 60 mg CpDB Take 1 Cap by mouth daily. 90 Cap 3    calcium-cholecalciferol, d3, (CALCIUM 600 + D) 600-125 mg-unit tab Take  by mouth two (2) times a day.  coenzyme q10 (CHEW Q) 100 mg chew Take  by mouth daily.  multivitamin (ONE A DAY) tablet Take 1 Tab by mouth daily.  magnesium Tab Take  by mouth two (2) times a day.  fluticasone (FLONASE) 50 mcg/actuation nasal spray 2 sprays in each nostril daily 1 Bottle 3    pimecrolimus (ELIDEL) 1 % topical cream Apply  to affected area two (2) times a day.  traMADol (ULTRAM) 50 mg tablet Take 1 Tab by mouth every six (6) hours as needed for Pain. Max Daily Amount: 200 mg. 50 Tab 1    HYDROcodone-acetaminophen (NORCO) 7.5-325 mg per tablet Take 1 Tab by mouth every eight (8) hours as needed for Pain. 40 Tab 0    sucralfate (CARAFATE) 100 mg/mL suspension Take 5 mL by mouth Before breakfast and dinner. 414 mL 3    topiramate (TOPAMAX) 25 mg tablet Take 50 mg by mouth three (3) times daily. 1 tab in am 2 tab pm      ALPRAZolam (XANAX) 0.25 mg tablet Take 1 Tab by mouth two (2) times daily as needed for Anxiety.  48 Tab 0     Allergies   Allergen Reactions    Pcn [Penicillins] Rash     Was tested is no longer allergic     Family History   Problem Relation Age of Onset    Stroke Mother     Stroke Father      Social History   Substance Use Topics    Smoking status: Never Smoker    Smokeless tobacco: Never Used    Alcohol use No     Patient Active Problem List   Diagnosis Code    Hyperlipidemia E78.5    Connective tissue disease ([facility] [address] M35.9    Depression F32.9    Hypertension I10    HH (hiatus hernia) K44.9    Gastroesophageal reflux disease K21.9    Rosacea L71.9    Capsulitis M77.9    Degeneration of intervertebral disc of cervical region M50.30    Allergy T78.40XA    Colitis, ischemic (HCC) K55.9    Migraine G43.909    Anxiety associated with depression F41.8    Raynaud's syndrome I73.00    Hypothyroidism E03.9    Pulmonary nodules R91.8    Renal cyst N28.1    Hypovitaminosis D E55.9    Biliary dyskinesia K82.8    Chronic tension-type headache, intractable G44.221    Osteoarthritis M19.90    Vascular insufficiency of intestine (HCC) K55.9    Dyslipidemia E78.5    Classical migraine with intractable migraine G43.119    Rotator cuff tear arthropathy M12.819    Lower gastrointestinal hemorrhage K92.2    Cervico-occipital neuralgia M54.81    Shoulder pain M25.519    Traumatic arthropathy involving shoulder region M12.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 No    Do you average more than 7 drinks per week? No      Functional Ability and Level of Safety:     Hearing Loss   none    Activities of Daily Living   Self-care. Requires assistance with: no ADLs    Fall Risk     Fall Risk Assessment, last 12 mths 6/7/2017   Able to walk? Yes   Fall in past 12 months? No     Abuse Screen   Patient is not abused    Review of Systems   Pertinent items are noted in HPI.     Physical Examination     Evaluation of Cognitive Function:  Mood/affect:  neutral  Appearance: age appropriate  Family member/caregiver input: none    Visit Vitals    BP (!) 160/110 (BP 1 Location: Left arm, BP Patient Position: Sitting)    Pulse 65    Temp 98.1 °F (36.7 °C) (Oral)    Resp 16    Ht 5' 7\" (1.702 m)    Wt 142 lb (64.4 kg)    SpO2 98%    BMI 22.24 kg/m2     General appearance: alert, cooperative, no distress, appears stated age  Lungs: clear to auscultation bilaterally  Heart: regular rate and rhythm, S1, S2 normal, no murmur, click, rub or gallop  Abdomen: abnormal findings:  tenderness mild in the entire abdomen, hyperactive bowel sounds  Extremities: extremities normal, atraumatic, no cyanosis or edema    Patient Care Team:  Lico Hedrick MD as PCP - General  Jaja Su MD (Ophthalmology)    Advice/Referrals/Counseling   Education and counseling provided:  Are appropriate based on today's review and evaluation      Assessment/Plan       ICD-10-CM ICD-9-CM    1. Routine general medical examination at a health care facility Z00.00 V70.0    2. Screening for alcoholism Z13.89 V79.1    3. Screening for diabetes mellitus Z13.1 V77.1    4. Screening for ischemic heart disease Z13.6 V81.0    5. Osteopenia, unspecified location M85.80 733.90 DEXA BONE DENSITY STUDY AXIAL   6. Breast cancer screening Z12.39 V76.10 VICKY MAMMO BI SCREENING INCL CAD   .    s  Her main issue is still dealing with her abdominal pain and limitations as a result with her diet. She is happy working with Dr. Priya Hayes and has tests that are upcoming. Her migraines are troubling her today and this is why her BP is elevated. Will f/u in 1 month for HTN.

## 2017-06-07 NOTE — PROGRESS NOTES
Seun Mckinney is a 70 y.o. female here today for medicare wellness and wants to discuss fatigue,migraines ,nails brittle     1. Have you been to the ER, urgent care clinic or hospitalized since your last visit? NO.     2. Have you seen or consulted any other health care providers outside of the [address] [place] [address] since your last visit (Include any pap smears or colon screening)? NO      Do you have an Advanced Directive? Yes will bring in copy     Would you like information on Advanced Directives?  NO    Learning Assessment 2/3/2015   PRIMARY LEARNER Patient   HIGHEST LEVEL OF EDUCATION - PRIMARY LEARNER  4 YEARS OF COLLEGE   BARRIERS PRIMARY LEARNER NONE   CO-LEARNER CAREGIVER No   PRIMARY LANGUAGE ENGLISH   LEARNER PREFERENCE PRIMARY LISTENING   ANSWERED BY self   RELATIONSHIP SELF

## 2017-06-07 NOTE — MR AVS SNAPSHOT
Visit Information Date & Time Provider Department Dept. Phone Encounter #  
 6/7/2017  1:30 PM Deborah Townsend, Lv [facility] [phone] [id number] Upcoming Health Maintenance Date Due Hepatitis C Screening 1945 GLAUCOMA SCREENING Q2Y 7/7/2012 MEDICARE YEARLY EXAM 7/1/2017 INFLUENZA AGE 9 TO ADULT 8/1/2017 BREAST CANCER SCRN MAMMOGRAM 7/26/2018 COLONOSCOPY 2/19/2020 DTaP/Tdap/Td series (3 - Td) 4/22/2026 Allergies as of 6/7/2017  Review Complete On: 6/7/2017 By: Waldo Grullon LPN Severity Noted Reaction Type Reactions Pcn [Penicillins]  04/30/2010    Rash Was tested is no longer allergic Current Immunizations  Reviewed on 6/30/2016 Name Date Influenza High Dose Vaccine PF 10/27/2016  9:24 AM, 10/19/2015 10:00 AM  
 Influenza Vaccine 11/18/2014 Influenza Vaccine Split 10/16/2012, 11/1/2010 Influenza Vaccine Whole 10/5/2011 PPD 9/14/2010 Pneumococcal Polysaccharide (PPSV-23) 2/24/2014, 1/1/2014 Pneumococcal Vaccine (Unspecified Type) 1/1/2007 TDAP Vaccine 4/13/2010 Td, Adsorbed PF 4/20/2010 Tdap 4/22/2016 Zoster 11/2/2009 Not reviewed this visit You Were Diagnosed With   
  
 Codes Comments Osteopenia, unspecified location    -  Primary ICD-10-CM: M85.80 ICD-9-CM: 733.90 Routine general medical examination at a health care facility     ICD-10-CM: Z00.00 ICD-9-CM: V70.0 Screening for alcoholism     ICD-10-CM: Z13.89 ICD-9-CM: V79.1 Screening for diabetes mellitus     ICD-10-CM: Z13.1 ICD-9-CM: V77.1 Screening for ischemic heart disease     ICD-10-CM: Z13.6 ICD-9-CM: V81.0 Breast cancer screening     ICD-10-CM: Z12.39 
ICD-9-CM: V76.10 Vitals BP Pulse Temp Resp Height(growth percentile) Weight(growth percentile) (!) 160/110 (BP 1 Location: Left arm, BP Patient Position: Sitting) 65 98.1 °F (36.7 °C) (Oral) 16 5' 7\" (1.702 m) 142 lb (64.4 kg) SpO2 BMI OB Status Smoking Status 98% 22.24 kg/m2 Postmenopausal Never Smoker Vitals History BMI and BSA Data Body Mass Index Body Surface Area  
 22.24 kg/m 2 1.74 m 2 Preferred Pharmacy Pharmacy Name Phone [facility] [phone] Your Updated Medication List  
  
   
This list is accurate as of: 6/7/17  2:19 PM.  Always use your most recent med list.  
  
  
  
  
 ALPRAZolam 0.25 mg tablet Commonly known as:  [place] Coup Take 1 Tab by mouth two (2) times daily as needed for Anxiety. atorvastatin 20 mg tablet Commonly known as:  LIPITOR  
TAKE 1 TABLET (20 MG) BY ORAL ROUTE ONCE DAILY CALCIUM 600 + D 600-125 mg-unit Tab Generic drug:  calcium-cholecalciferol (d3) Take  by mouth two (2) times a day. CHEW Q 100 mg Chew Generic drug:  coenzyme q10 Take  by mouth daily. clonazePAM 0.5 mg tablet Commonly known as:  Wendelyn Mech Take  by mouth nightly as needed. desonide 0.05 % cream  
Commonly known as:  Ulis Pool Dexlansoprazole 60 mg Cpdb Commonly known as:  DEXILANT Take 1 Cap by mouth daily. ELIDEL 1 % topical cream  
Generic drug:  pimecrolimus Apply  to affected area two (2) times a day. FLUoxetine 40 mg capsule Commonly known as:  PROzac Take 1 Cap by mouth daily. fluticasone 50 mcg/actuation nasal spray Commonly known as:  FLONASE  
2 sprays in each nostril daily HYDROcodone-acetaminophen 7.5-325 mg per tablet Commonly known as:  Jonelle Dus Take 1 Tab by mouth every eight (8) hours as needed for Pain.  
  
 levothyroxine 50 mcg tablet Commonly known as:  SYNTHROID Take 1 Tab by mouth Daily (before breakfast). lidocaine 5 % topical cream  
Apply  to affected area two (2) times daily as needed for Itching.  
  
 magnesium Tab Take  by mouth two (2) times a day. multivitamin tablet Commonly known as:  ONE A DAY Take 1 Tab by mouth daily. naratriptan 2.5 mg Tab Commonly known as:  [place] Greening Take 2.5 mg by mouth once as needed. sucralfate 100 mg/mL suspension Commonly known as:  Jinnie Gull Take 5 mL by mouth Before breakfast and dinner. topiramate 25 mg tablet Commonly known as:  TOPAMAX Take 50 mg by mouth three (3) times daily. 1 tab in am 2 tab pm  
  
 traMADol 50 mg tablet Commonly known as:  ULTRAM  
Take 1 Tab by mouth every six (6) hours as needed for Pain. Max Daily Amount: 200 mg.  
  
 verapamil  mg ER capsule Commonly known as:  Magnolia Batter Take 1 Cap by mouth daily for 90 days. VITAMIN B-2 100 mg tablet Generic drug:  riboflavin (vitamin B2) TAKE 1 TABLET BY MOUTH TWO TIMES A DAY To-Do List   
 06/07/2017 Imaging:  DEXA BONE DENSITY STUDY AXIAL   
  
 06/07/2017 Imaging:  VICKY MAMMO BI SCREENING INCL CAD Patient Instructions Medicare Part B Preventive Services Limitations Recommendation Scheduled Bone Mass Measurement 
(age 72 & older, biennial) Requires diagnosis related to osteoporosis or estrogen deficiency. Biennial benefit unless patient has history of long-term glucocorticoid tx or baseline is needed because initial test was by other method Cardiovascular Screening Blood Tests (every 5 years) Total cholesterol, HDL, Triglycerides Order as a panel if possible Colorectal Cancer Screening 
-Fecal occult blood test (annual) -Flexible sigmoidoscopy (5y) 
-Screening colonoscopy (10y) -Barium Enema Counseling to Prevent Tobacco Use (up to 8 sessions per year) - Counseling greater than 3 and up to 10 minutes - Counseling greater than 10 minutes Patients must be asymptomatic of tobacco-related conditions to receive as preventive service Diabetes Screening Tests (at least every 3 years, Medicare covers annually or at 6-month intervals for prediabetic patients) Fasting blood sugar (FBS) or glucose tolerance test (GTT) Patient must be diagnosed with one of the following: 
-Hypertension, Dyslipidemia, obesity, previous impaired FBS or GTT 
Or any two of the following: overweight, FH of diabetes, age ? 72, history of gestational diabetes, birth of baby weighing more than 9 pounds Diabetes Self-Management Training (DSMT) (no USPSTF recommendation) Requires referral by treating physician for patient with diabetes or renal disease. 10 hours of initial DSMT session of no less than 30 minutes each in a continuous 12-month period. 2 hours of follow-up DSMT in subsequent years. Glaucoma Screening (no USPSTF recommendation) Diabetes mellitus, family history, , age 48 or over,  American, age 72 or over Human Immunodeficiency Virus (HIV) Screening (annually for increased risk patients) HIV-1 and HIV-2 by EIA, ELMER, rapid antibody test, or oral mucosa transudate Patient must be at increased risk for HIV infection per USPSTF guidelines or pregnant. Tests covered annually for patients at increased risk. Pregnant patients may receive up to 3 test during pregnancy. Medical Nutrition Therapy (MNT) (for diabetes or renal disease not recommended schedule) Requires referral by treating physician for patient with diabetes or renal disease. Can be provided in same year as diabetes self-management training (DSMT), and CMS recommends medical nutrition therapy take place after DSMT. Up to 3 hours for initial year and 2 hours in subsequent years. Shingles Vaccination A shingles vaccine is also recommended once in a lifetime after age 61 Seasonal Influenza Vaccination (annually) Pneumococcal Vaccination (once after 65) Hepatitis B Vaccinations (if medium/high risk) Medium/high risk factors:  End-stage renal disease, Hemophiliacs who received Factor VIII or IX concentrates, Clients of institutions for the mentally retarded, Persons who live in the same house as a HepB virus carrier, Homosexual men, Illicit injectable drug abusers. Screening Mammography (biennial age 54-69) Annually (age 36 or over) Screening Pap Tests and Pelvic Examination (up to age 79 and after 79 if unknown history or abnormal study last 10 years) Every 24 months except high risk Ultrasound Screening for Abdominal Aortic Aneurysm (AAA) (once) Patient must be referred through IPPE and not have had a screening for abdominal aortic aneurysm before under Medicare. Limited to patients who meet one of the following criteria: 
- Men who are 73-68 years old and have smoked more than 100 cigarettes in their lifetime. 
-Anyone with a FH of AAA 
-Anyone recommended for screening by USPSTF Introducing [facility] SERVICES! Dear Cyndie Spivey: Thank you for requesting a [employer] account. Our records indicate that you already have an active [employer] account. You can access your account anytime at [email or web address] Did you know that you can access your hospital and ER discharge instructions at any time in [employer]? You can also review all of your test results from your hospital stay or ER visit. Additional Information If you have questions, please visit the Frequently Asked Questions section of the [employer] website at [email or web address] Remember, [employer] is NOT to be used for urgent needs. For medical emergencies, dial 911. Now available from your iPhone and Android! Please provide this summary of care documentation to your next provider. Your primary care clinician is listed as Ananth Irizarry. If you have any questions after today's visit, please call [phone].

## 2017-06-09 ENCOUNTER — HOSPITAL ENCOUNTER (OUTPATIENT)
Dept: LAB | Age: 72
Discharge: HOME | End: 2017-06-09
Payer: MEDICARE

## 2017-06-09 DIAGNOSIS — E78.00 PURE HYPERCHOLESTEROLEMIA: ICD-10-CM

## 2017-06-09 DIAGNOSIS — Z11.59 NEED FOR HEPATITIS C SCREENING TEST: ICD-10-CM

## 2017-06-09 LAB
ALBUMIN SERPL BCP-MCNC: 4.1 G/DL (ref 3.4–5)
ALBUMIN/GLOB SERPL: 1.3 {RATIO} (ref 0.8–1.7)
ALP SERPL-CCNC: 70 U/L (ref 45–117)
ALT SERPL-CCNC: 17 U/L (ref 13–56)
ANION GAP BLD CALC-SCNC: 9 MMOL/L (ref 3–18)
APPEARANCE UR: ABNORMAL
AST SERPL W P-5'-P-CCNC: 16 U/L (ref 15–37)
BASOPHILS # BLD AUTO: 0 K/UL (ref 0–0.06)
BASOPHILS # BLD: 0 % (ref 0–2)
BILIRUB DIRECT SERPL-MCNC: 0.1 MG/DL (ref 0–0.2)
BILIRUB SERPL-MCNC: 0.4 MG/DL (ref 0.2–1)
BILIRUB UR QL: NEGATIVE
BUN SERPL-MCNC: 16 MG/DL (ref 7–18)
BUN/CREAT SERPL: 17 (ref 12–20)
CALCIUM SERPL-MCNC: 9.6 MG/DL (ref 8.5–10.1)
CHLORIDE SERPL-SCNC: 107 MMOL/L (ref 100–108)
CHOLEST SERPL-MCNC: 167 MG/DL
CO2 SERPL-SCNC: 24 MMOL/L (ref 21–32)
COLOR UR: ABNORMAL
CREAT SERPL-MCNC: 0.96 MG/DL (ref 0.6–1.3)
DIFFERENTIAL METHOD BLD: ABNORMAL
EOSINOPHIL # BLD: 0 K/UL (ref 0–0.4)
EOSINOPHIL NFR BLD: 0 % (ref 0–5)
ERYTHROCYTE [DISTWIDTH] IN BLOOD BY AUTOMATED COUNT: 13.6 % (ref 11.6–14.5)
GLOBULIN SER CALC-MCNC: 3.2 G/DL (ref 2–4)
GLUCOSE SERPL-MCNC: 107 MG/DL (ref 74–99)
GLUCOSE UR STRIP.AUTO-MCNC: NEGATIVE MG/DL
HCT VFR BLD AUTO: 48.2 % (ref 35–45)
HDLC SERPL-MCNC: 69 MG/DL (ref 40–60)
HDLC SERPL: 2.4 {RATIO} (ref 0–5)
HGB BLD-MCNC: 16.1 G/DL (ref 12–16)
HGB UR QL STRIP: NEGATIVE
KETONES UR QL STRIP.AUTO: ABNORMAL MG/DL
LDLC SERPL CALC-MCNC: 83 MG/DL (ref 0–100)
LEUKOCYTE ESTERASE UR QL STRIP.AUTO: NEGATIVE
LIPID PROFILE,FLP: ABNORMAL
LYMPHOCYTES # BLD AUTO: 22 % (ref 21–52)
LYMPHOCYTES # BLD: 1.5 K/UL (ref 0.9–3.6)
MCH RBC QN AUTO: 31.4 PG (ref 24–34)
MCHC RBC AUTO-ENTMCNC: 33.4 G/DL (ref 31–37)
MCV RBC AUTO: 94 FL (ref 74–97)
MONOCYTES # BLD: 0.7 K/UL (ref 0.05–1.2)
MONOCYTES NFR BLD AUTO: 11 % (ref 3–10)
NEUTS SEG # BLD: 4.6 K/UL (ref 1.8–8)
NEUTS SEG NFR BLD AUTO: 67 % (ref 40–73)
NITRITE UR QL STRIP.AUTO: NEGATIVE
PH UR STRIP: 7 [PH] (ref 5–8)
PLATELET # BLD AUTO: 242 K/UL (ref 135–420)
PMV BLD AUTO: 11.5 FL (ref 9.2–11.8)
POTASSIUM SERPL-SCNC: 3.8 MMOL/L (ref 3.5–5.5)
PROT SERPL-MCNC: 7.3 G/DL (ref 6.4–8.2)
PROT UR STRIP-MCNC: NEGATIVE MG/DL
RBC # BLD AUTO: 5.13 M/UL (ref 4.2–5.3)
SODIUM SERPL-SCNC: 140 MMOL/L (ref 136–145)
SP GR UR REFRACTOMETRY: 1.01 (ref 1–1.03)
T4 FREE SERPL-MCNC: 0.9 NG/DL (ref 0.7–1.5)
TRIGL SERPL-MCNC: 75 MG/DL (ref ?–150)
TSH SERPL DL<=0.05 MIU/L-ACNC: 2.2 UIU/ML (ref 0.36–3.74)
UROBILINOGEN UR QL STRIP.AUTO: 0.2 EU/DL (ref 0.2–1)
VLDLC SERPL CALC-MCNC: 15 MG/DL
WBC # BLD AUTO: 6.9 K/UL (ref 4.6–13.2)

## 2017-06-09 PROCEDURE — 85025 COMPLETE CBC W/AUTO DIFF WBC: CPT | Performed by: INTERNAL MEDICINE

## 2017-06-09 PROCEDURE — 80048 BASIC METABOLIC PNL TOTAL CA: CPT | Performed by: INTERNAL MEDICINE

## 2017-06-09 PROCEDURE — 80061 LIPID PANEL: CPT | Performed by: INTERNAL MEDICINE

## 2017-06-09 PROCEDURE — 86803 HEPATITIS C AB TEST: CPT | Performed by: INTERNAL MEDICINE

## 2017-06-09 PROCEDURE — 80076 HEPATIC FUNCTION PANEL: CPT | Performed by: INTERNAL MEDICINE

## 2017-06-09 PROCEDURE — 84439 ASSAY OF FREE THYROXINE: CPT | Performed by: INTERNAL MEDICINE

## 2017-06-09 PROCEDURE — 36415 COLL VENOUS BLD VENIPUNCTURE: CPT | Performed by: INTERNAL MEDICINE

## 2017-06-09 PROCEDURE — 81003 URINALYSIS AUTO W/O SCOPE: CPT | Performed by: INTERNAL MEDICINE

## 2017-06-09 PROCEDURE — 84443 ASSAY THYROID STIM HORMONE: CPT | Performed by: INTERNAL MEDICINE

## 2017-06-12 LAB
HCV AB SER IA-ACNC: 0.05 INDEX
HCV AB SERPL QL IA: NEGATIVE
HCV COMMENT,HCGAC: NORMAL

## 2017-06-14 NOTE — PROGRESS NOTES
Glucose slightly elevated. Will just monitor. Hemoglobin a little elevated. Will repeat level in near future. Rest of labs fine.

## 2017-06-16 NOTE — PROGRESS NOTES
Call placed to patient, notified. Patient states she wants Dr Lindsay Lamas to know, she had another MD appt yesterday and her BP was good.

## 2017-07-05 ENCOUNTER — OFFICE VISIT (OUTPATIENT)
Dept: FAMILY MEDICINE CLINIC | Age: 72
End: 2017-07-05

## 2017-07-05 VITALS
RESPIRATION RATE: 17 BRPM | DIASTOLIC BLOOD PRESSURE: 88 MMHG | HEART RATE: 63 BPM | BODY MASS INDEX: 22.03 KG/M2 | TEMPERATURE: 98.9 F | HEIGHT: 67 IN | SYSTOLIC BLOOD PRESSURE: 128 MMHG | WEIGHT: 140.4 LBS | OXYGEN SATURATION: 99 %

## 2017-07-05 DIAGNOSIS — R03.0 ELEVATED BLOOD PRESSURE READING: Primary | ICD-10-CM

## 2017-07-05 RX ORDER — ASCORBIC ACID 500 MG
1000 TABLET ORAL DAILY
COMMUNITY
End: 2018-02-26 | Stop reason: ALTCHOICE

## 2017-07-05 RX ORDER — MONTELUKAST SODIUM 10 MG/1
10 TABLET ORAL DAILY
COMMUNITY
End: 2018-06-13 | Stop reason: ALTCHOICE

## 2017-07-05 RX ORDER — GLUCOSAMINE/CHONDR SU A SOD 750-600 MG
5000 TABLET ORAL DAILY
COMMUNITY
End: 2017-08-25 | Stop reason: ALTCHOICE

## 2017-07-05 RX ORDER — CYPROHEPTADINE HYDROCHLORIDE 4 MG/1
4 TABLET ORAL
COMMUNITY
End: 2017-12-04 | Stop reason: ALTCHOICE

## 2017-07-05 RX ORDER — LORATADINE 10 MG/1
10 TABLET ORAL
COMMUNITY
End: 2018-06-13 | Stop reason: ALTCHOICE

## 2017-07-05 RX ORDER — VERAPAMIL HYDROCHLORIDE 360 MG/1
360 CAPSULE, DELAYED RELEASE PELLETS ORAL DAILY
COMMUNITY
End: 2017-12-20 | Stop reason: SDUPTHER

## 2017-07-05 NOTE — PROGRESS NOTES
1. Have you been to the ER, urgent care clinic since your last visit? Hospitalized since your last visit? No    2. Have you seen or consulted any other health care providers outside of the [address] [place] [address] since your last visit? Include any pap smears or colon screening.  Yes When: 6/30/17 Neurology

## 2017-07-05 NOTE — PATIENT INSTRUCTIONS
Elevated Blood Pressure: Care Instructions  Your Care Instructions    Blood pressure is a measure of how hard the blood pushes against the walls of your arteries. It's normal for blood pressure to go up and down throughout the day. But if it stays up over time, you have high blood pressure. Two numbers tell you your blood pressure. The first number is the systolic pressure. It shows how hard the blood pushes when your heart is pumping. The second number is the diastolic pressure. It shows how hard the blood pushes between heartbeats, when your heart is relaxed and filling with blood. An ideal blood pressure in adults is less than 120/80 (say \"120 over 80\"). High blood pressure is 140/90 or higher. You have high blood pressure if your top number is 140 or higher or your bottom number is 90 or higher, or both. The main test for high blood pressure is simple, fast, and painless. To diagnose high blood pressure, your doctor will test your blood pressure at different times. After testing your blood pressure, your doctor may ask you to test it again when you are home. If you are diagnosed with high blood pressure, you can work with your doctor to make a long-term plan to manage it. Follow-up care is a key part of your treatment and safety. Be sure to make and go to all appointments, and call your doctor if you are having problems. It's also a good idea to know your test results and keep a list of the medicines you take. How can you care for yourself at home? · Do not smoke. Smoking increases your risk for heart attack and stroke. If you need help quitting, talk to your doctor about stop-smoking programs and medicines. These can increase your chances of quitting for good. · Stay at a healthy weight. · Try to limit how much sodium you eat to less than 2,300 milligrams (mg) a day. Your doctor may ask you to try to eat less than 1,500 mg a day. · Be physically active.  Get at least 30 minutes of exercise on most days of the week. Walking is a good choice. You also may want to do other activities, such as running, swimming, cycling, or playing tennis or team sports. · Avoid or limit alcohol. Talk to your doctor about whether you can drink any alcohol. · Eat plenty of fruits, vegetables, and low-fat dairy products. Eat less saturated and total fats. · Learn how to check your blood pressure at home. When should you call for help? Call your doctor now or seek immediate medical care if:  · Your blood pressure is much higher than normal (such as 180/110 or higher). · You think high blood pressure is causing symptoms such as:  ¨ Severe headache. ¨ Blurry vision. Watch closely for changes in your health, and be sure to contact your doctor if:  · You do not get better as expected. Where can you learn more? Go to http://gilda-michael.info/. Enter R571 in the search box to learn more about \"Elevated Blood Pressure: Care Instructions. \"  Current as of: April 3, 2017  Content Version: 11.3  © 9496-2647 [employer]. Care instructions adapted under license by [employer] (which disclaims liability or warranty for this information). If you have questions about a medical condition or this instruction, always ask your healthcare professional. [address] any warranty or liability for your use of this information.

## 2017-07-05 NOTE — MR AVS SNAPSHOT
Visit Information Date & Time Provider Department Dept. Phone Encounter #  
 7/5/2017  1:45 PM Lv Giordano [facility] [phone] [id number] Upcoming Health Maintenance Date Due  
 GLAUCOMA SCREENING Q2Y 7/7/2012 INFLUENZA AGE 9 TO ADULT 8/1/2017 MEDICARE YEARLY EXAM 6/8/2018 BREAST CANCER SCRN MAMMOGRAM 7/26/2018 COLONOSCOPY 2/19/2020 DTaP/Tdap/Td series (3 - Td) 4/22/2026 Allergies as of 7/5/2017  Review Complete On: 7/5/2017 By: Sarah Moreno Severity Noted Reaction Type Reactions Pcn [Penicillins]  04/30/2010    Rash Was tested is no longer allergic Current Immunizations  Reviewed on 6/30/2016 Name Date Influenza High Dose Vaccine PF 10/27/2016  9:24 AM, 10/19/2015 10:00 AM  
 Influenza Vaccine 11/18/2014 Influenza Vaccine Split 10/16/2012, 11/1/2010 Influenza Vaccine Whole 10/5/2011 PPD 9/14/2010 Pneumococcal Polysaccharide (PPSV-23) 2/24/2014, 1/1/2014 Pneumococcal Vaccine (Unspecified Type) 1/1/2007 TDAP Vaccine 4/13/2010 Td, Adsorbed PF 4/20/2010 Tdap 4/22/2016 Zoster 11/2/2009 Not reviewed this visit You Were Diagnosed With   
  
 Codes Comments Elevated blood pressure reading    -  Primary ICD-10-CM: R03.0 ICD-9-CM: 796.2 Vitals BP Pulse Temp Resp Height(growth percentile) Weight(growth percentile) 128/88 (BP 1 Location: Left arm, BP Patient Position: Sitting) 63 98.9 °F (37.2 °C) (Oral) 17 5' 7\" (1.702 m) 140 lb 6.4 oz (63.7 kg) SpO2 BMI OB Status Smoking Status 99% 21.99 kg/m2 Postmenopausal Never Smoker Vitals History BMI and BSA Data Body Mass Index Body Surface Area  
 21.99 kg/m 2 1.74 m 2 Preferred Pharmacy Pharmacy Name Phone [id number] [place] [place], [place] Alberto Maddi [phone] Your Updated Medication List  
  
   
 This list is accurate as of: 7/5/17  2:18 PM.  Always use your most recent med list.  
  
  
  
  
 ALPRAZolam 0.25 mg tablet Commonly known as:  Alex Celso Take 1 Tab by mouth two (2) times daily as needed for Anxiety. atorvastatin 20 mg tablet Commonly known as:  LIPITOR  
TAKE 1 TABLET (20 MG) BY ORAL ROUTE ONCE DAILY Biotin 2,500 mcg Cap Take 5,000 mcg by mouth daily. CALCIUM 600 + D 600-125 mg-unit Tab Generic drug:  calcium-cholecalciferol (d3) Take  by mouth two (2) times a day. CHEW Q 100 mg Chew Generic drug:  coenzyme q10 Take  by mouth daily. clonazePAM 0.5 mg tablet Commonly known as:  Ilene Fearing Take  by mouth nightly as needed. cyproheptadine 4 mg tablet Commonly known as:  PERIACTIN Take 4 mg by mouth two (2) times daily as needed. desonide 0.05 % cream  
Commonly known as:  Radha Ambrosia Dexlansoprazole 60 mg Cpdb Commonly known as:  DEXILANT Take 1 Cap by mouth daily. ELIDEL 1 % topical cream  
Generic drug:  pimecrolimus Apply  to affected area two (2) times a day. FLUoxetine 40 mg capsule Commonly known as:  PROzac Take 1 Cap by mouth daily. fluticasone 50 mcg/actuation nasal spray Commonly known as:  FLONASE  
2 sprays in each nostril daily HYDROcodone-acetaminophen 7.5-325 mg per tablet Commonly known as:  Brewster Plum Take 1 Tab by mouth every eight (8) hours as needed for Pain.  
  
 levothyroxine 50 mcg tablet Commonly known as:  SYNTHROID Take 1 Tab by mouth Daily (before breakfast). lidocaine 5 % topical cream  
Apply  to affected area two (2) times daily as needed for Itching. loratadine 10 mg tablet Commonly known as:  Disha Case Take 10 mg by mouth.  
  
 magnesium Tab Take  by mouth two (2) times a day. multivitamin tablet Commonly known as:  ONE A DAY Take 1 Tab by mouth daily. naratriptan 2.5 mg Tab Commonly known as:  Anderson Lombardo  
 Take 2.5 mg by mouth once as needed. SINGULAIR 10 mg tablet Generic drug:  montelukast  
Take 10 mg by mouth daily. sucralfate 100 mg/mL suspension Commonly known as:  Karina Constant Take 5 mL by mouth Before breakfast and dinner. topiramate 25 mg tablet Commonly known as:  TOPAMAX Take 50 mg by mouth three (3) times daily. 1 tab in am 2 tab pm  
  
 traMADol 50 mg tablet Commonly known as:  ULTRAM  
Take 1 Tab by mouth every six (6) hours as needed for Pain. Max Daily Amount: 200 mg.  
  
 verapamil  mg ER capsule Commonly known as:  June Junes Take 360 mg by mouth daily. VITAMIN B-2 100 mg tablet Generic drug:  riboflavin (vitamin B2) TAKE 1 TABLET BY MOUTH TWO TIMES A DAY  
  
 VITAMIN C 500 mg tablet Generic drug:  ascorbic acid (vitamin C) Take 1,000 mg by mouth daily. Patient Instructions Elevated Blood Pressure: Care Instructions Your Care Instructions Blood pressure is a measure of how hard the blood pushes against the walls of your arteries. It's normal for blood pressure to go up and down throughout the day. But if it stays up over time, you have high blood pressure. Two numbers tell you your blood pressure. The first number is the systolic pressure. It shows how hard the blood pushes when your heart is pumping. The second number is the diastolic pressure. It shows how hard the blood pushes between heartbeats, when your heart is relaxed and filling with blood. An ideal blood pressure in adults is less than 120/80 (say \"120 over 80\"). High blood pressure is 140/90 or higher. You have high blood pressure if your top number is 140 or higher or your bottom number is 90 or higher, or both. The main test for high blood pressure is simple, fast, and painless. To diagnose high blood pressure, your doctor will test your blood pressure at different times.  After testing your blood pressure, your doctor may ask you to test it again when you are home. If you are diagnosed with high blood pressure, you can work with your doctor to make a long-term plan to manage it. Follow-up care is a key part of your treatment and safety. Be sure to make and go to all appointments, and call your doctor if you are having problems. It's also a good idea to know your test results and keep a list of the medicines you take. How can you care for yourself at home? · Do not smoke. Smoking increases your risk for heart attack and stroke. If you need help quitting, talk to your doctor about stop-smoking programs and medicines. These can increase your chances of quitting for good. · Stay at a healthy weight. · Try to limit how much sodium you eat to less than 2,300 milligrams (mg) a day. Your doctor may ask you to try to eat less than 1,500 mg a day. · Be physically active. Get at least 30 minutes of exercise on most days of the week. Walking is a good choice. You also may want to do other activities, such as running, swimming, cycling, or playing tennis or team sports. · Avoid or limit alcohol. Talk to your doctor about whether you can drink any alcohol. · Eat plenty of fruits, vegetables, and low-fat dairy products. Eat less saturated and total fats. · Learn how to check your blood pressure at home. When should you call for help? Call your doctor now or seek immediate medical care if: 
· Your blood pressure is much higher than normal (such as 180/110 or higher). · You think high blood pressure is causing symptoms such as: ¨ Severe headache. ¨ Blurry vision. Watch closely for changes in your health, and be sure to contact your doctor if: 
· You do not get better as expected. Where can you learn more? Go to http://gilda-michael.info/. Enter Z380 in the search box to learn more about \"Elevated Blood Pressure: Care Instructions. \" Current as of: April 3, 2017 Content Version: 11.3 © 9366-2796 [employer]. Care instructions adapted under license by [employer] (which disclaims liability or warranty for this information). If you have questions about a medical condition or this instruction, always ask your healthcare professional. [address] any warranty or liability for your use of this information. Introducing [facility] & HEALTH SERVICES! Dear Alyx Haas: Thank you for requesting a [facility] account. Our records indicate that you already have an active [facility] account. You can access your account anytime at [email or web address] Did you know that you can access your hospital and ER discharge instructions at any time in [facility]? You can also review all of your test results from your hospital stay or ER visit. Additional Information If you have questions, please visit the Frequently Asked Questions section of the [facility] website at [email or web address] Remember, [facility] is NOT to be used for urgent needs. For medical emergencies, dial 911. Now available from your iPhone and Android! Please provide this summary of care documentation to your next provider. Your primary care clinician is listed as [email or web address]. If you have any questions after today's visit, please call [phone].

## 2017-07-05 NOTE — PROGRESS NOTES
Assessment/Plan:    Glenis Stevens was seen today for hypertension. Diagnoses and all orders for this visit:    Elevated blood pressure reading        Routine 6 mon f/u in Dec.    The plan was discussed with the patient. The patient verbalized understanding and is in agreement with the plan. All medication potential side effects were discussed with the patient.    -------------------------------------------------------------------------------------------------------------------        Judi Ambrosio is a 70 y.o. female and presents with Hypertension         Subjective:  Pt returns 4 weeks after last visit to f/u on elevated BP. Is back to normal.  Was 120/80 at Dr. Mark Anthony Santana office recently. She is doing well. ROS:  Constitutional: No recent weight change. No weakness/fatigue. No f/c. Skin: No rashes, change in nails/hair, itching   HENT: No HA, dizziness. No hearing loss/tinnitus. No nasal congestion/discharge. Eyes: No change in vision, double/blurred vision or eye pain/redness. Cardiovascular: No CP/palpitations. No BURCH/orthopnea/PND. Respiratory: No cough/sputum, dyspnea, wheezing. Gastointestinal: No dysphagia, reflux. No n/v. No constipation/diarrhea. No melena/rectal bleeding. Genitourinary: No dysuria, urinary hesitancy, nocturia, hematuria. No incontinence. Musculoskeletal: No joint pain/stiffness. No muscle pain/tenderness. Endo: No heat/cold intolerance, no polyuria/polydypsia. Heme: No h/o anemia. No easy bleeding/bruising. Allergy/Immunology: No seasonal rhinitis. Denies frequent colds, sinus/ear infections. Neurological: No seizures/numbness/weakness. No paresthesias. Psychiatric:  No depression, anxiety. The problem list was updated as a part of today's visit.   Patient Active Problem List   Diagnosis Code    Hyperlipidemia E78.5    Connective tissue disease ([address] M35.9    Depression F32.9    Hypertension I10    HH (hiatus hernia) K44.9    Gastroesophageal reflux disease K21.9    Rosacea L71.9    Capsulitis M77.9    Degeneration of intervertebral disc of cervical region M50.30    Allergy T78.40XA    Colitis, ischemic (HCC) K55.9    Migraine G43.909    Anxiety associated with depression F41.8    Raynaud's syndrome I73.00    Hypothyroidism E03.9    Pulmonary nodules R91.8    Renal cyst N28.1    Hypovitaminosis D E55.9    Biliary dyskinesia K82.8    Chronic tension-type headache, intractable G44.221    Osteoarthritis M19.90    Vascular insufficiency of intestine (HCC) K55.9    Dyslipidemia E78.5    Classical migraine with intractable migraine G43.119    Rotator cuff tear arthropathy M12.819    Lower gastrointestinal hemorrhage K92.2    Cervico-occipital neuralgia M54.81    Shoulder pain M25.519    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 SH:  Social History   Substance Use Topics    Smoking status: Never Smoker    Smokeless tobacco: Never Used    Alcohol use No       Medications/Allergies:  Current Outpatient Prescriptions on File Prior to Visit   Medication Sig Dispense Refill    levothyroxine (SYNTHROID) 50 mcg tablet Take 1 Tab by mouth Daily (before breakfast). 90 Tab 2    lidocaine 5 % topical cream Apply  to affected area two (2) times daily as needed for Itching.  VITAMIN B-2 100 mg tablet TAKE 1 TABLET BY MOUTH TWO TIMES A  Tab 2    atorvastatin (LIPITOR) 20 mg tablet TAKE 1 TABLET (20 MG) BY ORAL ROUTE ONCE DAILY 90 Tab 1    FLUoxetine (PROZAC) 40 mg capsule Take 1 Cap by mouth daily. 90 Cap 3    desonide (TRIDESILON) 0.05 % cream   3    clonazePAM (KLONOPIN) 0.5 mg tablet Take  by mouth nightly as needed.  topiramate (TOPAMAX) 25 mg tablet Take 50 mg by mouth three (3) times daily.  1 tab in am 2 tab pm      naratriptan (AMERGE) 2.5 mg tab Take 2.5 mg by mouth once as needed.  calcium-cholecalciferol, d3, (CALCIUM 600 + D) 600-125 mg-unit tab Take  by mouth two (2) times a day.  coenzyme q10 (CHEW Q) 100 mg chew Take  by mouth daily.  multivitamin (ONE A DAY) tablet Take 1 Tab by mouth daily.  magnesium Tab Take  by mouth two (2) times a day.  fluticasone (FLONASE) 50 mcg/actuation nasal spray 2 sprays in each nostril daily 1 Bottle 3    pimecrolimus (ELIDEL) 1 % topical cream Apply  to affected area two (2) times a day.  traMADol (ULTRAM) 50 mg tablet Take 1 Tab by mouth every six (6) hours as needed for Pain. Max Daily Amount: 200 mg. 50 Tab 1    HYDROcodone-acetaminophen (NORCO) 7.5-325 mg per tablet Take 1 Tab by mouth every eight (8) hours as needed for Pain. 40 Tab 0    sucralfate (CARAFATE) 100 mg/mL suspension Take 5 mL by mouth Before breakfast and dinner. 414 mL 3    ALPRAZolam (XANAX) 0.25 mg tablet Take 1 Tab by mouth two (2) times daily as needed for Anxiety. 50 Tab 0    Dexlansoprazole (DEXILANT) 60 mg CpDB Take 1 Cap by mouth daily. 90 Cap 3     No current facility-administered medications on file prior to visit.          Allergies   Allergen Reactions    Pcn [Penicillins] Rash     Was tested is no longer allergic         Health Maintenance:   Health Maintenance   Topic Date Due    GLAUCOMA SCREENING Q2Y  07/07/2012    INFLUENZA AGE 9 TO ADULT  08/01/2017    MEDICARE YEARLY EXAM  06/08/2018    BREAST CANCER SCRN MAMMOGRAM  07/26/2018    COLONOSCOPY  02/19/2020    DTaP/Tdap/Td series (3 - Td) 04/22/2026    Hepatitis C Screening  Completed    OSTEOPOROSIS SCREENING (DEXA)  Completed    ZOSTER VACCINE AGE 60>  Completed    Pneumococcal 65+ Low/Medium Risk  Completed       Objective:  Visit Vitals    /88 (BP 1 Location: Left arm, BP Patient Position: Sitting)    Pulse 63    Temp 98.9 °F (37.2 °C) (Oral)    Resp 17    Ht 5' 7\" (1.702 m)    Wt 140 lb 6.4 oz (63.7 kg)    SpO2 99%  BMI 21.99 kg/m2          Nurses notes and VS reviewed. Physical Examination: General appearance - alert, well appearing, and in no distress      Labwork and Ancillary Studies:    CBC w/Diff  Lab Results   Component Value Date/Time    WBC 6.9 06/09/2017 11:40 AM    HGB 16.1 06/09/2017 11:40 AM    PLATELET 235 84/33/2151 11:40 AM         Basic Metabolic Profile  Lab Results   Component Value Date/Time    Sodium 140 06/09/2017 11:40 AM    Potassium 3.8 06/09/2017 11:40 AM    Chloride 107 06/09/2017 11:40 AM    CO2 24 06/09/2017 11:40 AM    Anion gap 9 06/09/2017 11:40 AM    Glucose 107 06/09/2017 11:40 AM    BUN 16 06/09/2017 11:40 AM    Creatinine 0.96 06/09/2017 11:40 AM    BUN/Creatinine ratio 17 06/09/2017 11:40 AM    GFR est AA >60 06/09/2017 11:40 AM    GFR est non-AA 57 06/09/2017 11:40 AM    Calcium 9.6 06/09/2017 11:40 AM         LFT  Lab Results   Component Value Date/Time    ALT (SGPT) 17 06/09/2017 11:40 AM    AST (SGOT) 16 06/09/2017 11:40 AM    Alk.  phosphatase 70 06/09/2017 11:40 AM    Bilirubin, direct 0.1 06/09/2017 11:40 AM    Bilirubin, total 0.4 06/09/2017 11:40 AM         Cholesterol  Lab Results   Component Value Date/Time    Cholesterol, total 167 06/09/2017 11:40 AM    HDL Cholesterol 69 06/09/2017 11:40 AM    LDL, calculated 83 06/09/2017 11:40 AM    Triglyceride 75 06/09/2017 11:40 AM    CHOL/HDL Ratio 2.4 06/09/2017 11:40 AM

## 2017-08-21 DIAGNOSIS — M85.80 OSTEOPENIA, UNSPECIFIED LOCATION: ICD-10-CM

## 2017-08-24 ENCOUNTER — TELEPHONE (OUTPATIENT)
Dept: FAMILY MEDICINE CLINIC | Age: 72
End: 2017-08-24

## 2017-08-24 NOTE — TELEPHONE ENCOUNTER
Pt called her BP has been running high for a little over a week now, 140/90's. She is asking if she can been seen asap as she did not want to wait until the next available appointment to be seen for this issue.

## 2017-08-25 ENCOUNTER — OFFICE VISIT (OUTPATIENT)
Dept: FAMILY MEDICINE CLINIC | Age: 72
End: 2017-08-25

## 2017-08-25 VITALS
HEART RATE: 70 BPM | RESPIRATION RATE: 16 BRPM | HEIGHT: 67 IN | SYSTOLIC BLOOD PRESSURE: 120 MMHG | DIASTOLIC BLOOD PRESSURE: 84 MMHG | WEIGHT: 144 LBS | TEMPERATURE: 97.4 F | BODY MASS INDEX: 22.6 KG/M2 | OXYGEN SATURATION: 98 %

## 2017-08-25 DIAGNOSIS — E55.9 HYPOVITAMINOSIS D: ICD-10-CM

## 2017-08-25 DIAGNOSIS — M50.30 DEGENERATION OF INTERVERTEBRAL DISC OF CERVICAL REGION: ICD-10-CM

## 2017-08-25 DIAGNOSIS — I10 ESSENTIAL HYPERTENSION: Primary | ICD-10-CM

## 2017-08-25 DIAGNOSIS — E78.00 PURE HYPERCHOLESTEROLEMIA: ICD-10-CM

## 2017-08-25 DIAGNOSIS — E03.4 HYPOTHYROIDISM DUE TO ACQUIRED ATROPHY OF THYROID: ICD-10-CM

## 2017-08-25 NOTE — PROGRESS NOTES
Assessment/Plan:    *Diagnoses and all orders for this visit:    1. Essential hypertension    2. Degeneration of intervertebral disc of cervical region    3. Pure hypercholesterolemia  -     LIPID PANEL; Future  -     HEPATIC FUNCTION PANEL; Future  -     METABOLIC PANEL, BASIC; Future  -     TSH 3RD GENERATION; Future  -     VITAMIN D, 25 HYDROXY; Future    4. Hypothyroidism due to acquired atrophy of thyroid    5. Hypovitaminosis D  -     VITAMIN D, 25 HYDROXY; Future      Pt will continue to monitor BP at home and notify me if readings are higher. Will then adjust her meds. For now, meds to stay the same. Rout f/u in Dec.  BW prior. The plan was discussed with the patient. The patient verbalized understanding and is in agreement with the plan. All medication potential side effects were discussed with the patient.    -------------------------------------------------------------------------------------------------------------------        Braxton Noe is a 70 y.o. female and presents with No chief complaint on file. Subjective:  Pt here b/c she called yesterday saying her BP was running high and she needed to see me. Today, her BP is fine. When questioned further on this, she explained that her systolic is typically fine, but the diastolic is at time in the low 90's. She was worried about this. She also brought in a copy of a MRI cervical spine, ordered in Jan 2017 by Dr. Urmila Recio. It shows significant DDD disease at multiple levels. Pt was referred to Dr. Nadege Beltre several months ago. She received spinal injections and has been attending PT. Is managing it the best way she can. ROS:  Constitutional: No recent weight change. No weakness/fatigue. No f/c. Skin: No rashes, change in nails/hair, itching   HENT: No HA, dizziness. No hearing loss/tinnitus. No nasal congestion/discharge. Eyes: No change in vision, double/blurred vision or eye pain/redness.     Cardiovascular: No CP/palpitations. No BURCH/orthopnea/PND. Respiratory: No cough/sputum, dyspnea, wheezing. Gastointestinal: No dysphagia, reflux. No n/v. No constipation/diarrhea. No melena/rectal bleeding. Genitourinary: No dysuria, urinary hesitancy, nocturia, hematuria. No incontinence. Musculoskeletal: No joint pain/stiffness. No muscle pain/tenderness. Endo: No heat/cold intolerance, no polyuria/polydypsia. Heme: No h/o anemia. No easy bleeding/bruising. Allergy/Immunology: No seasonal rhinitis. Denies frequent colds, sinus/ear infections. Neurological: No seizures/numbness/weakness. No paresthesias. Psychiatric:  No depression, anxiety. The problem list was updated as a part of today's visit. Patient Active Problem List   Diagnosis Code    Hyperlipidemia E78.5    Connective tissue disease ([facility] [address] M35.9    Depression F32.9    Hypertension I10    HH (hiatus hernia) K44.9    Gastroesophageal reflux disease K21.9    Rosacea L71.9    Capsulitis M77.9    Degeneration of intervertebral disc of cervical region M50.30    Allergy T78.40XA    Colitis, ischemic ([facility] [address] K55.9    Migraine G43.909    Anxiety associated with depression F41.8    Raynaud's syndrome I73.00    Hypothyroidism E03.9    Pulmonary nodules R91.8    Renal cyst N28.1    Hypovitaminosis D E55.9    Biliary dyskinesia K82.8    Chronic tension-type headache, intractable G44.221    Osteoarthritis M19.90    Vascular insufficiency of intestine (HCC) K55.9    Dyslipidemia E78.5    Classical migraine with intractable migraine G43.119    Rotator cuff tear arthropathy M12.819    Lower gastrointestinal hemorrhage K92.2    Cervico-occipital neuralgia M54.81    Shoulder pain M25.519    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         SH:  Social History Substance Use Topics    Smoking status: Never Smoker    Smokeless tobacco: Never Used    Alcohol use No       Medications/Allergies:  Current Outpatient Prescriptions on File Prior to Visit   Medication Sig Dispense Refill    loratadine (CLARITIN) 10 mg tablet Take 10 mg by mouth.  cyproheptadine (PERIACTIN) 4 mg tablet Take 4 mg by mouth two (2) times daily as needed.  montelukast (SINGULAIR) 10 mg tablet Take 10 mg by mouth daily.  verapamil ER (VERELAN) 360 mg ER capsule Take 360 mg by mouth daily.  ascorbic acid, vitamin C, (VITAMIN C) 500 mg tablet Take 1,000 mg by mouth daily.  levothyroxine (SYNTHROID) 50 mcg tablet Take 1 Tab by mouth Daily (before breakfast). 90 Tab 2    fluticasone (FLONASE) 50 mcg/actuation nasal spray 2 sprays in each nostril daily 1 Bottle 3    lidocaine 5 % topical cream Apply  to affected area two (2) times daily as needed for Itching.  pimecrolimus (ELIDEL) 1 % topical cream Apply  to affected area two (2) times a day.  atorvastatin (LIPITOR) 20 mg tablet TAKE 1 TABLET (20 MG) BY ORAL ROUTE ONCE DAILY 90 Tab 1    traMADol (ULTRAM) 50 mg tablet Take 1 Tab by mouth every six (6) hours as needed for Pain. Max Daily Amount: 200 mg. 50 Tab 1    HYDROcodone-acetaminophen (NORCO) 7.5-325 mg per tablet Take 1 Tab by mouth every eight (8) hours as needed for Pain. 40 Tab 0    FLUoxetine (PROZAC) 40 mg capsule Take 1 Cap by mouth daily. 90 Cap 3    desonide (TRIDESILON) 0.05 % cream   3    clonazePAM (KLONOPIN) 0.5 mg tablet Take  by mouth nightly as needed.  topiramate (TOPAMAX) 25 mg tablet Take 50 mg by mouth three (3) times daily. 1 tab in am 2 tab pm      naratriptan (AMERGE) 2.5 mg tab Take 2.5 mg by mouth once as needed.  ALPRAZolam (XANAX) 0.25 mg tablet Take 1 Tab by mouth two (2) times daily as needed for Anxiety. 50 Tab 0    Dexlansoprazole (DEXILANT) 60 mg CpDB Take 1 Cap by mouth daily.  90 Cap 3    calcium-cholecalciferol, d3, (CALCIUM 600 + D) 600-125 mg-unit tab Take  by mouth two (2) times a day.  coenzyme q10 (CHEW Q) 100 mg chew Take  by mouth daily.  multivitamin (ONE A DAY) tablet Take 1 Tab by mouth daily.  magnesium Tab Take  by mouth two (2) times a day. No current facility-administered medications on file prior to visit. Allergies   Allergen Reactions    Pcn [Penicillins] Rash     Was tested is no longer allergic         Health Maintenance:   Health Maintenance   Topic Date Due    GLAUCOMA SCREENING Q2Y  07/07/2012    INFLUENZA AGE 9 TO ADULT  08/01/2017    MEDICARE YEARLY EXAM  06/08/2018    BREAST CANCER SCRN MAMMOGRAM  08/03/2019    COLONOSCOPY  07/07/2022    DTaP/Tdap/Td series (3 - Td) 04/22/2026    Hepatitis C Screening  Completed    OSTEOPOROSIS SCREENING (DEXA)  Completed    ZOSTER VACCINE AGE 60>  Completed    Pneumococcal 65+ Low/Medium Risk  Completed       Objective:  Visit Vitals    /84    Pulse 70    Temp 97.4 °F (36.3 °C)    Resp 16    Ht 5' 7\" (1.702 m)    Wt 144 lb (65.3 kg)    SpO2 98%    BMI 22.55 kg/m2          Nurses notes and VS reviewed.       Physical Examination: General appearance - alert, well appearing, and in no distress  Chest - clear to auscultation, no wheezes, rales or rhonchi, symmetric air entry  Heart - normal rate, regular rhythm, normal S1, S2, no murmurs, rubs, clicks or gallops        Labwork and Ancillary Studies:    CBC w/Diff  Lab Results   Component Value Date/Time    WBC 6.9 06/09/2017 11:40 AM    HGB 16.1 06/09/2017 11:40 AM    PLATELET 331 46/72/5108 11:40 AM         Basic Metabolic Profile  Lab Results   Component Value Date/Time    Sodium 140 06/09/2017 11:40 AM    Potassium 3.8 06/09/2017 11:40 AM    Chloride 107 06/09/2017 11:40 AM    CO2 24 06/09/2017 11:40 AM    Anion gap 9 06/09/2017 11:40 AM    Glucose 107 06/09/2017 11:40 AM    BUN 16 06/09/2017 11:40 AM    Creatinine 0.96 06/09/2017 11:40 AM BUN/Creatinine ratio 17 06/09/2017 11:40 AM    GFR est AA >60 06/09/2017 11:40 AM    GFR est non-AA 57 06/09/2017 11:40 AM    Calcium 9.6 06/09/2017 11:40 AM         LFT  Lab Results   Component Value Date/Time    ALT (SGPT) 17 06/09/2017 11:40 AM    AST (SGOT) 16 06/09/2017 11:40 AM    Alk.  phosphatase 70 06/09/2017 11:40 AM    Bilirubin, direct 0.1 06/09/2017 11:40 AM    Bilirubin, total 0.4 06/09/2017 11:40 AM         Cholesterol  Lab Results   Component Value Date/Time    Cholesterol, total 167 06/09/2017 11:40 AM    HDL Cholesterol 69 06/09/2017 11:40 AM    LDL, calculated 83 06/09/2017 11:40 AM    Triglyceride 75 06/09/2017 11:40 AM    CHOL/HDL Ratio 2.4 06/09/2017 11:40 AM

## 2017-08-25 NOTE — PROGRESS NOTES
Grace Said is a 70 y.o. female here today for HTN follow-up. 1. Have you been to the ER, urgent care clinic since your last visit? Hospitalized since your last visit? No    2. Have you seen or consulted any other health care providers outside of the [address] [place] [address] since your last visit? Include any pap smears or colon screening.  Yes Reason for visit: gastro,ent,neurology

## 2017-08-25 NOTE — PATIENT INSTRUCTIONS
Cervical Disc Disease: Care Instructions  Your Care Instructions    Cervical disc disease results from damage, disease, or wear and tear to the discs between the bones (vertebra) in your neck. The discs act as shock absorbers for the spine and keep the spine flexible. When a disc is damaged, it can bulge out and press against the nerve roots or spinal cord. This is sometimes called a herniated or \"slipped disc. \" This pressure can cause pain and numbness or tingling in your arms and hands. It can also cause weakness in your legs. An accident can damage a disc and cause it to break open (rupture). Aging and hard physical work can also cause damage to cervical discs. The first treatments for cervical disc disease include physical therapy, special neck exercises, heat, and pain medicine. If these fail, your doctor may inject steroids and pain medicine into your neck. Surgery is usually done only if other treatments have not worked. Follow-up care is a key part of your treatment and safety. Be sure to make and go to all appointments, and call your doctor if you are having problems. It's also a good idea to know your test results and keep a list of the medicines you take. How can you care for yourself at home? · Take pain medicines exactly as directed. ¨ If the doctor gave you a prescription medicine for pain, take it as prescribed. ¨ If you are not taking a prescription pain medicine, ask your doctor if you can take an over-the-counter medicine. · Don't spend too long in one position. Take short breaks to move around and change positions. · Wear a seat belt and shoulder harness when you are in a car. · Sleep with a pillow under your head and neck that keeps your neck straight. · Follow your doctor's instructions for gentle neck-stretching exercises. · Do not smoke. Smoking can slow healing of your discs. If you need help quitting, talk to your doctor about stop-smoking programs and medicines.  These can increase your chances of quitting for good. · Avoid strenuous work or exercise until your doctor says it is okay. When should you call for help? Call 911 anytime you think you may need emergency care. For example, call if:  · You are unable to move an arm or a leg at all. Call your doctor now or seek immediate medical care if:  · You have new or worse symptoms in your arms, legs, chest, belly, or buttocks. Symptoms may include:  ¨ Numbness or tingling. ¨ Weakness. ¨ Pain. · You lose bladder or bowel control. Watch closely for changes in your health, and be sure to contact your doctor if:  · You are not getting better as expected. Where can you learn more? Go to http://gilda-michael.info/. Enter N118 in the search box to learn more about \"Cervical Disc Disease: Care Instructions. \"  Current as of: March 21, 2017  Content Version: 11.3  © 7754-5395 [employer], [employer]. Care instructions adapted under license by [employer] (which disclaims liability or warranty for this information). If you have questions about a medical condition or this instruction, always ask your healthcare professional. [address] any warranty or liability for your use of this information.

## 2017-10-02 DIAGNOSIS — Z12.39 BREAST CANCER SCREENING: ICD-10-CM

## 2017-10-06 ENCOUNTER — CLINICAL SUPPORT (OUTPATIENT)
Dept: FAMILY MEDICINE CLINIC | Age: 72
End: 2017-10-06

## 2017-10-06 DIAGNOSIS — Z23 ENCOUNTER FOR IMMUNIZATION: Primary | ICD-10-CM

## 2017-10-06 NOTE — PROGRESS NOTES
Flu Vaccine given on the Left Deltoid, IM    Immunization/s administered 10/6/2017 by Veronique Bruce LPN with consent. Patient tolerated the procedure well. No reactions noted.

## 2017-11-09 ENCOUNTER — HOSPITAL ENCOUNTER (OUTPATIENT)
Dept: LAB | Age: 72
Discharge: HOME | End: 2017-11-09
Payer: MEDICARE

## 2017-11-09 ENCOUNTER — OFFICE VISIT (OUTPATIENT)
Dept: FAMILY MEDICINE CLINIC | Age: 72
End: 2017-11-09

## 2017-11-09 VITALS
WEIGHT: 144 LBS | HEIGHT: 67 IN | SYSTOLIC BLOOD PRESSURE: 158 MMHG | DIASTOLIC BLOOD PRESSURE: 96 MMHG | HEART RATE: 70 BPM | OXYGEN SATURATION: 97 % | RESPIRATION RATE: 18 BRPM | TEMPERATURE: 98.1 F | BODY MASS INDEX: 22.6 KG/M2

## 2017-11-09 DIAGNOSIS — G89.29 CHRONIC PAIN OF MULTIPLE JOINTS: ICD-10-CM

## 2017-11-09 DIAGNOSIS — M25.561 CHRONIC PAIN OF RIGHT KNEE: ICD-10-CM

## 2017-11-09 DIAGNOSIS — M54.50 CHRONIC BILATERAL LOW BACK PAIN WITHOUT SCIATICA: Primary | ICD-10-CM

## 2017-11-09 DIAGNOSIS — G89.29 CHRONIC BILATERAL LOW BACK PAIN WITHOUT SCIATICA: Primary | ICD-10-CM

## 2017-11-09 DIAGNOSIS — G89.29 CHRONIC PAIN OF RIGHT KNEE: ICD-10-CM

## 2017-11-09 DIAGNOSIS — F32.89 OTHER DEPRESSION: ICD-10-CM

## 2017-11-09 DIAGNOSIS — M25.50 CHRONIC PAIN OF MULTIPLE JOINTS: ICD-10-CM

## 2017-11-09 DIAGNOSIS — R76.8 CYCLIC CITRULLINATED PEPTIDE (CCP) ANTIBODY POSITIVE: ICD-10-CM

## 2017-11-09 LAB
CRP SERPL-MCNC: <0.3 MG/DL (ref 0–0.3)
ERYTHROCYTE [SEDIMENTATION RATE] IN BLOOD: 7 MM/HR (ref 0–30)

## 2017-11-09 PROCEDURE — 86200 CCP ANTIBODY: CPT | Performed by: INTERNAL MEDICINE

## 2017-11-09 PROCEDURE — 86140 C-REACTIVE PROTEIN: CPT | Performed by: INTERNAL MEDICINE

## 2017-11-09 PROCEDURE — 36415 COLL VENOUS BLD VENIPUNCTURE: CPT | Performed by: INTERNAL MEDICINE

## 2017-11-09 PROCEDURE — 86225 DNA ANTIBODY NATIVE: CPT | Performed by: INTERNAL MEDICINE

## 2017-11-09 PROCEDURE — 85652 RBC SED RATE AUTOMATED: CPT | Performed by: INTERNAL MEDICINE

## 2017-11-09 PROCEDURE — 86431 RHEUMATOID FACTOR QUANT: CPT | Performed by: INTERNAL MEDICINE

## 2017-11-09 RX ORDER — FLUOXETINE HYDROCHLORIDE 20 MG/1
20 CAPSULE ORAL DAILY
Qty: 90 CAP | Refills: 1 | Status: SHIPPED | OUTPATIENT
Start: 2017-11-09 | End: 2018-01-24 | Stop reason: ALTCHOICE

## 2017-11-09 RX ORDER — MELOXICAM 7.5 MG/1
7.5 TABLET ORAL DAILY
Qty: 30 TAB | Refills: 0 | Status: SHIPPED | OUTPATIENT
Start: 2017-11-09 | End: 2017-12-04 | Stop reason: ALTCHOICE

## 2017-11-09 NOTE — PATIENT INSTRUCTIONS

## 2017-11-09 NOTE — PROGRESS NOTES
Hugo Lofton is a 70 y.o. female is here for arthritis pain and cold symptoms. 1. Have you been to the ER, urgent care clinic since your last visit? Hospitalized since your last visit? No    2. Have you seen or consulted any other health care providers outside of the [address] [place] [address] since your last visit? Include any pap smears or colon screening. Dr. Edwin Duke for GI.       Health Maintenance Due   Topic Date Due    GLAUCOMA SCREENING Q2Y  07/07/2012

## 2017-11-09 NOTE — MR AVS SNAPSHOT
Visit Information Date & Time Provider Department Dept. Phone Encounter #  
 11/9/2017  1:45 PM Maryannefelipe Orozco, [address] [place] [phone] [id number] Upcoming Health Maintenance Date Due  
 GLAUCOMA SCREENING Q2Y 7/7/2012 MEDICARE YEARLY EXAM 6/8/2018 BREAST CANCER SCRN MAMMOGRAM 8/3/2019 COLONOSCOPY 7/7/2022 DTaP/Tdap/Td series (3 - Td) 4/22/2026 Allergies as of 11/9/2017  Review Complete On: 11/9/2017 By: Maryanne Orozco MD  
  
 Severity Noted Reaction Type Reactions Pcn [Penicillins]  04/30/2010    Rash Was tested is no longer allergic Current Immunizations  Reviewed on 6/30/2016 Name Date Influenza High Dose Vaccine PF 10/6/2017, 10/27/2016  9:24 AM, 10/19/2015 10:00 AM  
 Influenza Vaccine 11/18/2014 Influenza Vaccine Split 10/16/2012, 11/1/2010 Influenza Vaccine Whole 10/5/2011 PPD 9/14/2010 Pneumococcal Polysaccharide (PPSV-23) 2/24/2014, 1/1/2014 TDAP Vaccine 4/13/2010 Td, Adsorbed PF 4/20/2010 Tdap 4/22/2016 ZZZ-RETIRED (DO NOT USE) Pneumococcal Vaccine (Unspecified Type) 1/1/2007 Zoster 11/2/2009 Not reviewed this visit You Were Diagnosed With   
  
 Codes Comments Chronic bilateral low back pain without sciatica    -  Primary ICD-10-CM: M54.5, G89.29 ICD-9-CM: 724.2, 338.29 Chronic pain of right knee     ICD-10-CM: M25.561, G89.29 ICD-9-CM: 719.46, 338.29 Chronic pain of multiple joints     ICD-10-CM: M25.50, G89.29 ICD-9-CM: 719.49, 338.29 Vitals BP Pulse Temp Resp Height(growth percentile) Weight(growth percentile) (!) 158/96 (BP 1 Location: Left arm, BP Patient Position: Sitting) 70 98.1 °F (36.7 °C) (Oral) 18 5' 7\" (1.702 m) 144 lb (65.3 kg) SpO2 BMI OB Status Smoking Status 97% 22.55 kg/m2 Postmenopausal Never Smoker BMI and BSA Data Body Mass Index Body Surface Area  
 22.55 kg/m 2 1.76 m 2 Preferred Pharmacy Pharmacy Name Phone [id number] Neeraj Santos [phone] Your Updated Medication List  
  
   
This list is accurate as of: 11/9/17  2:25 PM.  Always use your most recent med list.  
  
  
  
  
 ALPRAZolam 0.25 mg tablet Commonly known as:  Adelaide Dallin Take 1 Tab by mouth two (2) times daily as needed for Anxiety. atorvastatin 20 mg tablet Commonly known as:  LIPITOR  
TAKE 1 TABLET (20 MG) BY MOUTH ONCE DAILY CALCIUM 600 + D 600-125 mg-unit Tab Generic drug:  calcium-cholecalciferol (d3) Take  by mouth two (2) times a day. CHEW Q 100 mg Chew Generic drug:  coenzyme q10 Take  by mouth daily. clonazePAM 0.5 mg tablet Commonly known as:  Treva Crockett Take  by mouth nightly as needed. cyproheptadine 4 mg tablet Commonly known as:  PERIACTIN Take 4 mg by mouth two (2) times daily as needed. desonide 0.05 % cream  
Commonly known as:  Helen Limbo Dexlansoprazole 60 mg Cpdb Commonly known as:  DEXILANT Take 1 Cap by mouth daily. ELIDEL 1 % topical cream  
Generic drug:  pimecrolimus Apply  to affected area two (2) times a day. FLUoxetine 20 mg capsule Commonly known as:  PROzac Take 1 Cap by mouth daily. fluticasone 50 mcg/actuation nasal spray Commonly known as:  FLONASE  
2 sprays in each nostril daily HYDROcodone-acetaminophen 7.5-325 mg per tablet Commonly known as:  Birda [place] Take 1 Tab by mouth every eight (8) hours as needed for Pain.  
  
 levothyroxine 50 mcg tablet Commonly known as:  SYNTHROID Take 1 Tab by mouth Daily (before breakfast). lidocaine 5 % topical cream  
Apply  to affected area two (2) times daily as needed for Itching. loratadine 10 mg tablet Commonly known as:  [place] Fleeting Take 10 mg by mouth.  
  
 magnesium Tab Take  by mouth two (2) times a day. meloxicam 7.5 mg tablet Commonly known as:  MOBIC Take 1 Tab by mouth daily. multivitamin tablet Commonly known as:  ONE A DAY Take 1 Tab by mouth daily. naratriptan 2.5 mg Tab Commonly known as:  Dorrine Miller Take 2.5 mg by mouth once as needed. SINGULAIR 10 mg tablet Generic drug:  montelukast  
Take 10 mg by mouth daily. topiramate 25 mg tablet Commonly known as:  TOPAMAX Take 50 mg by mouth three (3) times daily. 1 tab in am 2 tab pm  
  
 traMADol 50 mg tablet Commonly known as:  ULTRAM  
Take 1 Tab by mouth every six (6) hours as needed for Pain. Max Daily Amount: 200 mg.  
  
 verapamil  mg ER capsule Commonly known as:  Cruz Cirri Take 360 mg by mouth daily. VITAMIN C 500 mg tablet Generic drug:  ascorbic acid (vitamin C) Take 1,000 mg by mouth daily. Prescriptions Sent to Pharmacy Refills  
 meloxicam (MOBIC) 7.5 mg tablet 0 Sig: Take 1 Tab by mouth daily. Class: Normal  
 Pharmacy: [address] [place] [address] Ph #: [phone] Route: Oral  
 FLUoxetine (PROZAC) 20 mg capsule 1 Sig: Take 1 Cap by mouth daily. Class: Normal  
 Pharmacy: [address] [place] [address] Ph #: [phone] Route: Oral  
  
To-Do List   
 11/09/2017 Lab:  PATRICK COMPREHENSIVE PANEL   
  
 11/09/2017 Lab:  C REACTIVE PROTEIN, QT   
  
 11/09/2017 Lab:  CYCLIC CITRUL PEPTIDE AB, IGG   
  
 11/09/2017 Lab:  RHEUMATOID FACTOR, IGM   
  
 11/09/2017 Lab:  SED RATE (ESR) Patient Instructions Back Pain: Care Instructions Your Care Instructions Back pain has many possible causes. It is often related to problems with muscles and ligaments of the back. It may also be related to problems with the nerves, discs, or bones of the back. Moving, lifting, standing, sitting, or sleeping in an awkward way can strain the back. Sometimes you don't notice the injury until later. Arthritis is another common cause of back pain. Although it may hurt a lot, back pain usually improves on its own within several weeks. Most people recover in 12 weeks or less. Using good home treatment and being careful not to stress your back can help you feel better sooner. Follow-up care is a key part of your treatment and safety. Be sure to make and go to all appointments, and call your doctor if you are having problems. It's also a good idea to know your test results and keep a list of the medicines you take. How can you care for yourself at home? · Sit or lie in positions that are most comfortable and reduce your pain. Try one of these positions when you lie down: ¨ Lie on your back with your knees bent and supported by large pillows. ¨ Lie on the floor with your legs on the seat of a sofa or chair. Jose Left on your side with your knees and hips bent and a pillow between your legs. ¨ Lie on your stomach if it does not make pain worse. · Do not sit up in bed, and avoid soft couches and twisted positions. Bed rest can help relieve pain at first, but it delays healing. Avoid bed rest after the first day of back pain. · Change positions every 30 minutes. If you must sit for long periods of time, take breaks from sitting. Get up and walk around, or lie in a comfortable position. · Try using a heating pad on a low or medium setting for 15 to 20 minutes every 2 or 3 hours. Try a warm shower in place of one session with the heating pad. · You can also try an ice pack for 10 to 15 minutes every 2 to 3 hours. Put a thin cloth between the ice pack and your skin. · Take pain medicines exactly as directed. ¨ If the doctor gave you a prescription medicine for pain, take it as prescribed. ¨ If you are not taking a prescription pain medicine, ask your doctor if you can take an over-the-counter medicine. · Take short walks several times a day. You can start with 5 to 10 minutes, 3 or 4 times a day, and work up to longer walks.  Walk on level surfaces and avoid hills and stairs until your back is better. · Return to work and other activities as soon as you can. Continued rest without activity is usually not good for your back. · To prevent future back pain, do exercises to stretch and strengthen your back and stomach. Learn how to use good posture, safe lifting techniques, and proper body mechanics. When should you call for help? Call your doctor now or seek immediate medical care if: 
? · You have new or worsening numbness in your legs. ? · You have new or worsening weakness in your legs. (This could make it hard to stand up.) ? · You lose control of your bladder or bowels. ? Watch closely for changes in your health, and be sure to contact your doctor if: 
? · Your pain gets worse. ? · You are not getting better after 2 weeks. Where can you learn more? Go to http://gilda-michael.info/. Enter P861 in the search box to learn more about \"Back Pain: Care Instructions. \" Current as of: March 21, 2017 Content Version: 11.4 © 2568-6631 [employer] Care instructions adapted under license by [employer] (which disclaims liability or warranty for this information). If you have questions about a medical condition or this instruction, always ask your healthcare professional. [address] any warranty or liability for your use of this information. Introducing [facility]! Dear Robbin Mcdermott: Thank you for requesting a [facility] account. Our records indicate that you already have an active [facility] account. You can access your account anytime at [email or web address]"[email or web address]. [employer]/"[employer] Did you know that you can access your hospital and ER discharge instructions at any time in [facility]? You can also review all of your test results from your hospital stay or ER visit. Additional Information If you have questions, please visit the Frequently Asked Questions section of the [employer] website at [email or web address] Remember, [employer] is NOT to be used for urgent needs. For medical emergencies, dial 911. Now available from your iPhone and Android! Please provide this summary of care documentation to your next provider. Your primary care clinician is listed as Ananth 51. If you have any questions after today's visit, please call [phone].

## 2017-11-10 LAB
CCP IGA+IGG SERPL IA-ACNC: 34 UNITS (ref 0–19)
CENTROMERE B AB SER-ACNC: <0.2 AI (ref 0–0.9)
CHROMATIN AB SERPL-ACNC: <0.2 AI (ref 0–0.9)
DSDNA AB SER-ACNC: <1 IU/ML (ref 0–9)
ENA JO1 AB SER-ACNC: <0.2 AI (ref 0–0.9)
ENA RNP AB SER-ACNC: 0.2 AI (ref 0–0.9)
ENA SCL70 AB SER-ACNC: <0.2 AI (ref 0–0.9)
ENA SM AB SER-ACNC: <0.2 AI (ref 0–0.9)
ENA SS-A AB SER-ACNC: <0.2 AI (ref 0–0.9)
ENA SS-B AB SER-ACNC: <0.2 AI (ref 0–0.9)
SEE BELOW, 164869: NORMAL

## 2017-11-10 NOTE — PROGRESS NOTES
Assessment/Plan:    *Diagnoses and all orders for this visit:    1. Chronic bilateral low back pain without sciatica  -     meloxicam (MOBIC) 7.5 mg tablet; Take 1 Tab by mouth daily. 2. Chronic pain of right knee  -     meloxicam (MOBIC) 7.5 mg tablet; Take 1 Tab by mouth daily. 3. Chronic pain of multiple joints  -     PATRICK COMPREHENSIVE PANEL; Future  -     RHEUMATOID FACTOR, IGM; Future  -     CYCLIC CITRUL PEPTIDE AB, IGG; Future  -     SED RATE (ESR); Future  -     C REACTIVE PROTEIN, QT; Future  -     meloxicam (MOBIC) 7.5 mg tablet; Take 1 Tab by mouth daily. 4. Other depression    Other orders  -     FLUoxetine (PROZAC) 20 mg capsule; Take 1 Cap by mouth daily. The plan was discussed with the patient. The patient verbalized understanding and is in agreement with the plan. All medication potential side effects were discussed with the patient.    -------------------------------------------------------------------------------------------------------------------        Brandyn Sher is a 70 y.o. female and presents with Cold Symptoms and Arthritis         Subjective:  Pt here for joint pains, last several weeks. Having pain in back predominantly. Also having knee pain, particularly in the RT knee. She also has been feeling very tired and low in energy. She is concerned that this may due to her anti-depressant not working as well for her. ROS:  Constitutional: No recent weight change. No weakness/fatigue. No f/c. Skin: No rashes, change in nails/hair, itching   HENT: No HA, dizziness. No hearing loss/tinnitus. No nasal congestion/discharge. Eyes: No change in vision, double/blurred vision or eye pain/redness. Cardiovascular: No CP/palpitations. No BURCH/orthopnea/PND. Respiratory: No cough/sputum, dyspnea, wheezing. Gastointestinal: No dysphagia, reflux. No n/v. No constipation/diarrhea. No melena/rectal bleeding.    Genitourinary: No dysuria, urinary hesitancy, nocturia, hematuria. No incontinence. Musculoskeletal: No joint pain/stiffness. No muscle pain/tenderness. Endo: No heat/cold intolerance, no polyuria/polydypsia. Heme: No h/o anemia. No easy bleeding/bruising. Allergy/Immunology: No seasonal rhinitis. Denies frequent colds, sinus/ear infections. Neurological: No seizures/numbness/weakness. No paresthesias. Psychiatric:  No depression, anxiety. The problem list was updated as a part of today's visit. Patient Active Problem List   Diagnosis Code    Hyperlipidemia E78.5    Connective tissue disease ([facility] [address] M35.9    Depression F32.9    Hypertension I10    HH (hiatus hernia) K44.9    Gastroesophageal reflux disease K21.9    Rosacea L71.9    Capsulitis M77.9    Degeneration of intervertebral disc of cervical region M50.30    Allergy T78.40XA    Colitis, ischemic ([facility] [address] K55.9    Migraine G43.909    Anxiety associated with depression F41.8    Raynaud's syndrome I73.00    Hypothyroidism E03.9    Pulmonary nodules R91.8    Renal cyst N28.1    Hypovitaminosis D E55.9    Biliary dyskinesia K82.8    Chronic tension-type headache, intractable G44.221    Osteoarthritis M19.90    Vascular insufficiency of intestine (HCC) K55.9    Dyslipidemia E78.5    Classical migraine with intractable migraine G43.119    Rotator cuff tear arthropathy M12.819    Lower gastrointestinal hemorrhage K92.2    Cervico-occipital neuralgia M54.81    Shoulder pain M25.519    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         SH:  Social History   Substance Use Topics    Smoking status: Never Smoker    Smokeless tobacco: Never Used    Alcohol use No       Medications/Allergies:  Current Outpatient Prescriptions on File Prior to Visit   Medication Sig Dispense Refill    atorvastatin (LIPITOR) 20 mg tablet TAKE 1 TABLET (20 MG) BY MOUTH ONCE DAILY 90 Tab 1    loratadine (CLARITIN) 10 mg tablet Take 10 mg by mouth.  montelukast (SINGULAIR) 10 mg tablet Take 10 mg by mouth daily.  verapamil ER (VERELAN) 360 mg ER capsule Take 360 mg by mouth daily.  ascorbic acid, vitamin C, (VITAMIN C) 500 mg tablet Take 1,000 mg by mouth daily.  levothyroxine (SYNTHROID) 50 mcg tablet Take 1 Tab by mouth Daily (before breakfast). 90 Tab 2    lidocaine 5 % topical cream Apply  to affected area two (2) times daily as needed for Itching.  desonide (TRIDESILON) 0.05 % cream   3    topiramate (TOPAMAX) 25 mg tablet Take 50 mg by mouth three (3) times daily. 1 tab in am 2 tab pm      naratriptan (AMERGE) 2.5 mg tab Take 2.5 mg by mouth once as needed.  Dexlansoprazole (DEXILANT) 60 mg CpDB Take 1 Cap by mouth daily. 90 Cap 3    calcium-cholecalciferol, d3, (CALCIUM 600 + D) 600-125 mg-unit tab Take  by mouth two (2) times a day.  coenzyme q10 (CHEW Q) 100 mg chew Take  by mouth daily.  multivitamin (ONE A DAY) tablet Take 1 Tab by mouth daily.  magnesium Tab Take  by mouth two (2) times a day.  cyproheptadine (PERIACTIN) 4 mg tablet Take 4 mg by mouth two (2) times daily as needed.  fluticasone (FLONASE) 50 mcg/actuation nasal spray 2 sprays in each nostril daily 1 Bottle 3    pimecrolimus (ELIDEL) 1 % topical cream Apply  to affected area two (2) times a day.  traMADol (ULTRAM) 50 mg tablet Take 1 Tab by mouth every six (6) hours as needed for Pain. Max Daily Amount: 200 mg. 50 Tab 1    HYDROcodone-acetaminophen (NORCO) 7.5-325 mg per tablet Take 1 Tab by mouth every eight (8) hours as needed for Pain. 40 Tab 0    clonazePAM (KLONOPIN) 0.5 mg tablet Take  by mouth nightly as needed.  ALPRAZolam (XANAX) 0.25 mg tablet Take 1 Tab by mouth two (2) times daily as needed for Anxiety. 50 Tab 0     No current facility-administered medications on file prior to visit. Allergies   Allergen Reactions    Pcn [Penicillins] Rash     Was tested is no longer allergic         Health Maintenance:   Health Maintenance   Topic Date Due    GLAUCOMA SCREENING Q2Y  07/07/2012    MEDICARE YEARLY EXAM  06/08/2018    BREAST CANCER SCRN MAMMOGRAM  08/03/2019    COLONOSCOPY  07/07/2022    DTaP/Tdap/Td series (3 - Td) 04/22/2026    Hepatitis C Screening  Completed    OSTEOPOROSIS SCREENING (DEXA)  Completed    ZOSTER VACCINE AGE 60>  Completed    Pneumococcal 65+ Low/Medium Risk  Completed    Influenza Age 5 to Adult  Completed       Objective:  Visit Vitals    BP (!) 158/96 (BP 1 Location: Left arm, BP Patient Position: Sitting)    Pulse 70    Temp 98.1 °F (36.7 °C) (Oral)    Resp 18    Ht 5' 7\" (1.702 m)    Wt 144 lb (65.3 kg)    SpO2 97%    BMI 22.55 kg/m2          Nurses notes and VS reviewed. Physical Examination: General appearance - alert, well appearing, and in no distress  Chest - clear to auscultation, no wheezes, rales or rhonchi, symmetric air entry  Heart - normal rate, regular rhythm, normal S1, S2, no murmurs, rubs, clicks or gallops        Labwork and Ancillary Studies:    CBC w/Diff  Lab Results   Component Value Date/Time    WBC 6.9 06/09/2017 11:40 AM    HGB 16.1 06/09/2017 11:40 AM    PLATELET 484 03/51/2994 11:40 AM         Basic Metabolic Profile  Lab Results   Component Value Date/Time    Sodium 140 06/09/2017 11:40 AM    Potassium 3.8 06/09/2017 11:40 AM    Chloride 107 06/09/2017 11:40 AM    CO2 24 06/09/2017 11:40 AM    Anion gap 9 06/09/2017 11:40 AM    Glucose 107 06/09/2017 11:40 AM    BUN 16 06/09/2017 11:40 AM    Creatinine 0.96 06/09/2017 11:40 AM    BUN/Creatinine ratio 17 06/09/2017 11:40 AM    GFR est AA >60 06/09/2017 11:40 AM    GFR est non-AA 57 06/09/2017 11:40 AM    Calcium 9.6 06/09/2017 11:40 AM         LFT  Lab Results   Component Value Date/Time    ALT (SGPT) 17 06/09/2017 11:40 AM    AST (SGOT) 16 06/09/2017 11:40 AM    Alk. phosphatase 70 06/09/2017 11:40 AM    Bilirubin, direct 0.1 06/09/2017 11:40 AM    Bilirubin, total 0.4 06/09/2017 11:40 AM         Cholesterol  Lab Results   Component Value Date/Time    Cholesterol, total 167 06/09/2017 11:40 AM    HDL Cholesterol 69 06/09/2017 11:40 AM    LDL, calculated 83 06/09/2017 11:40 AM    Triglyceride 75 06/09/2017 11:40 AM    CHOL/HDL Ratio 2.4 06/09/2017 11:40 AM

## 2017-11-13 NOTE — PROGRESS NOTES
Spoke with patient notified of below she voiced understanding but wasn't aware of the Rheumatology referral from last year  please create patient is aware that we will [facility] to [facility] and number given to follow up if she doesn't get a call from them

## 2017-11-13 NOTE — PROGRESS NOTES
Please call pt. Her lab test shows a weak positive result for rheumatoid arthritis. This same test was run last year by Dr. Anisa Weiss and was a strong positive and she referred pt to rheumatology. What happened with that visit?

## 2017-11-15 ENCOUNTER — TELEPHONE (OUTPATIENT)
Dept: FAMILY MEDICINE CLINIC | Age: 72
End: 2017-11-15

## 2017-11-15 NOTE — TELEPHONE ENCOUNTER
Pt called in wanting to leave a message for Nancie. Calling about a referral to the arthritis doctor. Justo Garcia stating they never received anything on this patient. Please resend referral info and notes.

## 2017-11-29 LAB — CANNABINOIDS BLD CFM-MCNC: 2 IU/ML (ref 0–15)

## 2017-12-04 ENCOUNTER — OFFICE VISIT (OUTPATIENT)
Dept: FAMILY MEDICINE CLINIC | Age: 72
End: 2017-12-04

## 2017-12-04 VITALS
HEIGHT: 67 IN | SYSTOLIC BLOOD PRESSURE: 154 MMHG | WEIGHT: 141 LBS | OXYGEN SATURATION: 99 % | TEMPERATURE: 98.3 F | HEART RATE: 67 BPM | RESPIRATION RATE: 20 BRPM | BODY MASS INDEX: 22.13 KG/M2 | DIASTOLIC BLOOD PRESSURE: 88 MMHG

## 2017-12-04 DIAGNOSIS — R05.9 COUGH: ICD-10-CM

## 2017-12-04 DIAGNOSIS — J06.9 UPPER RESPIRATORY TRACT INFECTION, UNSPECIFIED TYPE: Primary | ICD-10-CM

## 2017-12-04 RX ORDER — AZITHROMYCIN 500 MG/1
500 TABLET, FILM COATED ORAL DAILY
Qty: 7 TAB | Refills: 0 | Status: SHIPPED | OUTPATIENT
Start: 2017-12-04 | End: 2017-12-11

## 2017-12-04 RX ORDER — HYDROCODONE POLISTIREX AND CHLORPHENIRAMINE POLISTIREX 10; 8 MG/5ML; MG/5ML
5 SUSPENSION, EXTENDED RELEASE ORAL
Qty: 100 ML | Refills: 0 | Status: SHIPPED | OUTPATIENT
Start: 2017-12-04 | End: 2018-01-24 | Stop reason: ALTCHOICE

## 2017-12-04 NOTE — PROGRESS NOTES
Assessment/Plan:    *Diagnoses and all orders for this visit:    1. Upper respiratory tract infection, unspecified type  -     azithromycin (ZITHROMAX) 500 mg tab; Take 1 Tab by mouth daily for 7 days. -     chlorpheniramine-HYDROcodone (TUSSIONEX) 10-8 mg/5 mL suspension; Take 5 mL by mouth nightly as needed for Cough. Max Daily Amount: 5 mL. Indications: Cough    2. Cough  -     XR CHEST PA LAT; Future        Will return if not better. The plan was discussed with the patient. The patient verbalized understanding and is in agreement with the plan. All medication potential side effects were discussed with the patient.    -------------------------------------------------------------------------------------------------------------------        Deborah Hernandez is a 67 y.o. female and presents with Cough; Cold Symptoms; and Diarrhea         Subjective:  Pt has been dealing with cold symptoms for 3-4 weeks. Then two weekends ago, developed a deep chest cough. Dark colored phlegm, sinus congestion, malaise. Has been seen in the ER for a migraine and that is what they focused on. They did not address the cold symptoms, has also had diarrhea that has since resolved, but continues to have poor appetite because stomach cramps have not resolved. ROS:  Constitutional: No recent weight change. No weakness/fatigue. No f/c. Skin: No rashes, change in nails/hair, itching   HENT: No HA, dizziness. No hearing loss/tinnitus. No nasal congestion/discharge. Eyes: No change in vision, double/blurred vision or eye pain/redness. Cardiovascular: No CP/palpitations. No BURCH/orthopnea/PND. Respiratory: No cough/sputum, dyspnea, wheezing. Gastointestinal: No dysphagia, reflux. No n/v. No constipation/diarrhea. No melena/rectal bleeding. Genitourinary: No dysuria, urinary hesitancy, nocturia, hematuria. No incontinence. Musculoskeletal: No joint pain/stiffness. No muscle pain/tenderness.    Endo: No heat/cold intolerance, no polyuria/polydypsia. Heme: No h/o anemia. No easy bleeding/bruising. Allergy/Immunology: No seasonal rhinitis. Denies frequent colds, sinus/ear infections. Neurological: No seizures/numbness/weakness. No paresthesias. Psychiatric:  No depression, anxiety. The problem list was updated as a part of today's visit. Patient Active Problem List   Diagnosis Code    Hyperlipidemia E78.5    Connective tissue disease ([facility] [address] M35.9    Depression F32.9    Hypertension I10    HH (hiatus hernia) K44.9    Gastroesophageal reflux disease K21.9    Rosacea L71.9    Capsulitis M77.9    Degeneration of intervertebral disc of cervical region M50.30    Allergy T78.40XA    Colitis, ischemic ([facility] [address] K55.9    Migraine G43.909    Anxiety associated with depression F41.8    Raynaud's syndrome I73.00    Hypothyroidism E03.9    Pulmonary nodules R91.8    Renal cyst N28.1    Hypovitaminosis D E55.9    Biliary dyskinesia K82.8    Chronic tension-type headache, intractable G44.221    Osteoarthritis M19.90    Vascular insufficiency of intestine (HCC) K55.9    Dyslipidemia E78.5    Classical migraine with intractable migraine G43.119    Rotator cuff tear arthropathy M12.819    Lower gastrointestinal hemorrhage K92.2    Cervico-occipital neuralgia M54.81    Shoulder pain M25.519    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 SH:  Social History   Substance Use Topics    Smoking status: Never Smoker    Smokeless tobacco: Never Used    Alcohol use No       Medications/Allergies:  Current Outpatient Prescriptions on File Prior to Visit   Medication Sig Dispense Refill    FLUoxetine (PROZAC) 20 mg capsule Take 1 Cap by mouth daily.  90 Cap 1    atorvastatin (LIPITOR) 20 mg tablet TAKE 1 TABLET (20 MG) BY MOUTH ONCE DAILY 90 Tab 1    loratadine (CLARITIN) 10 mg tablet Take 10 mg by mouth.  montelukast (SINGULAIR) 10 mg tablet Take 10 mg by mouth daily.  verapamil ER (VERELAN) 360 mg ER capsule Take 360 mg by mouth daily.  ascorbic acid, vitamin C, (VITAMIN C) 500 mg tablet Take 1,000 mg by mouth daily.  levothyroxine (SYNTHROID) 50 mcg tablet Take 1 Tab by mouth Daily (before breakfast). 90 Tab 2    lidocaine 5 % topical cream Apply  to affected area two (2) times daily as needed for Itching.  topiramate (TOPAMAX) 25 mg tablet Take 50 mg by mouth three (3) times daily. 1 tab in am 2 tab pm      naratriptan (AMERGE) 2.5 mg tab Take 2.5 mg by mouth once as needed.  Dexlansoprazole (DEXILANT) 60 mg CpDB Take 1 Cap by mouth daily. 90 Cap 3    calcium-cholecalciferol, d3, (CALCIUM 600 + D) 600-125 mg-unit tab Take  by mouth two (2) times a day.  coenzyme q10 (CHEW Q) 100 mg chew Take  by mouth daily.  multivitamin (ONE A DAY) tablet Take 1 Tab by mouth daily.  magnesium Tab Take  by mouth two (2) times a day. No current facility-administered medications on file prior to visit.          Allergies   Allergen Reactions    Pcn [Penicillins] Rash     Was tested is no longer allergic         Health Maintenance:   Health Maintenance   Topic Date Due    GLAUCOMA SCREENING Q2Y  07/07/2012    MEDICARE YEARLY EXAM  06/08/2018    BREAST CANCER SCRN MAMMOGRAM  08/03/2019    COLONOSCOPY  07/07/2022    DTaP/Tdap/Td series (3 - Td) 04/22/2026    Hepatitis C Screening  Completed    OSTEOPOROSIS SCREENING (DEXA)  Completed    ZOSTER VACCINE AGE 60>  Completed    Pneumococcal 65+ Low/Medium Risk  Completed    Influenza Age 5 to Adult  Completed       Objective:  Visit Vitals    BP (!) 162/92 (BP 1 Location: Left arm, BP Patient Position: Sitting)    Pulse 67    Temp 98.3 °F (36.8 °C) (Oral)    Resp 20    Ht 5' 7\" (1.702 m)    Wt 141 lb (64 kg)    SpO2 99%    BMI 22.08 kg/m2 Nurses notes and VS reviewed. Physical Examination: General appearance - ill-appearing  Ears - bilateral TM's and external ear canals normal  Mouth - erythematous  Neck - supple, no significant adenopathy  Chest - rhonchi noted scattered, crackles in B/L bases  Heart - normal rate, regular rhythm, normal S1, S2, no murmurs, rubs, clicks or gallops  Abdomen - soft, nontender, nondistended, no masses or organomegaly          Labwork and Ancillary Studies:    CBC w/Diff  Lab Results   Component Value Date/Time    WBC 6.9 06/09/2017 11:40 AM    HGB 16.1 06/09/2017 11:40 AM    PLATELET 507 44/27/6048 11:40 AM         Basic Metabolic Profile  Lab Results   Component Value Date/Time    Sodium 140 06/09/2017 11:40 AM    Potassium 3.8 06/09/2017 11:40 AM    Chloride 107 06/09/2017 11:40 AM    CO2 24 06/09/2017 11:40 AM    Anion gap 9 06/09/2017 11:40 AM    Glucose 107 06/09/2017 11:40 AM    BUN 16 06/09/2017 11:40 AM    Creatinine 0.96 06/09/2017 11:40 AM    BUN/Creatinine ratio 17 06/09/2017 11:40 AM    GFR est AA >60 06/09/2017 11:40 AM    GFR est non-AA 57 06/09/2017 11:40 AM    Calcium 9.6 06/09/2017 11:40 AM         LFT  Lab Results   Component Value Date/Time    ALT (SGPT) 17 06/09/2017 11:40 AM    AST (SGOT) 16 06/09/2017 11:40 AM    Alk.  phosphatase 70 06/09/2017 11:40 AM    Bilirubin, direct 0.1 06/09/2017 11:40 AM    Bilirubin, total 0.4 06/09/2017 11:40 AM         Cholesterol  Lab Results   Component Value Date/Time    Cholesterol, total 167 06/09/2017 11:40 AM    HDL Cholesterol 69 06/09/2017 11:40 AM    LDL, calculated 83 06/09/2017 11:40 AM    Triglyceride 75 06/09/2017 11:40 AM    CHOL/HDL Ratio 2.4 06/09/2017 11:40 AM

## 2017-12-04 NOTE — PROGRESS NOTES
Brandyn Sher is a 67 y.o. female is here for cough, cold symptoms, and diarrhea. 1. Have you been to the ER, urgent care clinic since your last visit? Hospitalized since your last visit? [facility] , one for mirgraine, and second time for migraine and diarrhea. 2. Have you seen or consulted any other health care providers outside of the [address] [place] [address] since your last visit? Include any pap smears or colon screening.  Dr. Kamila RAZO,        Health Maintenance Due   Topic Date Due    GLAUCOMA SCREENING Q2Y  07/07/2012

## 2017-12-04 NOTE — MR AVS SNAPSHOT
Visit Information Date & Time Provider Department Dept. Phone Encounter #  
 12/4/2017  8:00 AM Néstor Philippe, Lv [facility] [phone] [id number] Upcoming Health Maintenance Date Due  
 GLAUCOMA SCREENING Q2Y 7/7/2012 MEDICARE YEARLY EXAM 6/8/2018 BREAST CANCER SCRN MAMMOGRAM 8/3/2019 COLONOSCOPY 7/7/2022 DTaP/Tdap/Td series (3 - Td) 4/22/2026 Allergies as of 12/4/2017  Review Complete On: 12/4/2017 By: Néstor Philippe MD  
  
 Severity Noted Reaction Type Reactions Pcn [Penicillins]  04/30/2010    Rash Was tested is no longer allergic Current Immunizations  Reviewed on 6/30/2016 Name Date Influenza High Dose Vaccine PF 10/6/2017, 10/27/2016  9:24 AM, 10/19/2015 10:00 AM  
 Influenza Vaccine 11/18/2014 Influenza Vaccine Split 10/16/2012, 11/1/2010 Influenza Vaccine Whole 10/5/2011 PPD 9/14/2010 Pneumococcal Polysaccharide (PPSV-23) 2/24/2014, 1/1/2014 TDAP Vaccine 4/13/2010 Td, Adsorbed PF 4/20/2010 Tdap 4/22/2016 ZZZ-RETIRED (DO NOT USE) Pneumococcal Vaccine (Unspecified Type) 1/1/2007 Zoster 11/2/2009 Not reviewed this visit You Were Diagnosed With   
  
 Codes Comments Upper respiratory tract infection, unspecified type    -  Primary ICD-10-CM: J06.9 ICD-9-CM: 465.9 Cough     ICD-10-CM: R05 ICD-9-CM: 871. 2 Vitals BP Pulse Temp Resp Height(growth percentile) Weight(growth percentile) (!) 162/92 (BP 1 Location: Left arm, BP Patient Position: Sitting) 67 98.3 °F (36.8 °C) (Oral) 20 5' 7\" (1.702 m) 141 lb (64 kg) SpO2 BMI OB Status Smoking Status 99% 22.08 kg/m2 Postmenopausal Never Smoker BMI and BSA Data Body Mass Index Body Surface Area 22.08 kg/m 2 1.74 m 2 Preferred Pharmacy Pharmacy Name Phone [id number] [address], [place] [phone] Your Updated Medication List  
  
   
 This list is accurate as of: 12/4/17  8:37 AM.  Always use your most recent med list.  
  
  
  
  
 atorvastatin 20 mg tablet Commonly known as:  LIPITOR  
TAKE 1 TABLET (20 MG) BY MOUTH ONCE DAILY  
  
 azithromycin 500 mg Tab Commonly known as:  Katja Ax Take 1 Tab by mouth daily for 7 days. CALCIUM 600 + D 600-125 mg-unit Tab Generic drug:  calcium-cholecalciferol (d3) Take  by mouth two (2) times a day. CHEW Q 100 mg Chew Generic drug:  coenzyme q10 Take  by mouth daily. chlorpheniramine-HYDROcodone 10-8 mg/5 mL suspension Commonly known as:  Zygmunt Guardian Take 5 mL by mouth nightly as needed for Cough. Max Daily Amount: 5 mL. Indications: Cough Dexlansoprazole 60 mg Cpdb Commonly known as:  DEXILANT Take 1 Cap by mouth daily. FLUoxetine 20 mg capsule Commonly known as:  PROzac Take 1 Cap by mouth daily. levothyroxine 50 mcg tablet Commonly known as:  SYNTHROID Take 1 Tab by mouth Daily (before breakfast). lidocaine 5 % topical cream  
Apply  to affected area two (2) times daily as needed for Itching. loratadine 10 mg tablet Commonly known as:  Micki Draft Take 10 mg by mouth.  
  
 magnesium Tab Take  by mouth two (2) times a day. multivitamin tablet Commonly known as:  ONE A DAY Take 1 Tab by mouth daily. naratriptan 2.5 mg Tab Commonly known as:  Meredith Brim Take 2.5 mg by mouth once as needed. SINGULAIR 10 mg tablet Generic drug:  montelukast  
Take 10 mg by mouth daily. topiramate 25 mg tablet Commonly known as:  TOPAMAX Take 50 mg by mouth three (3) times daily. 1 tab in am 2 tab pm  
  
 verapamil  mg ER capsule Commonly known as:  Tory Magyar Take 360 mg by mouth daily. VITAMIN C 500 mg tablet Generic drug:  ascorbic acid (vitamin C) Take 1,000 mg by mouth daily. Prescriptions Printed Refills chlorpheniramine-HYDROcodone (TUSSIONEX) 10-8 mg/5 mL suspension 0 Sig: Take 5 mL by mouth nightly as needed for Cough. Max Daily Amount: 5 mL. Indications: Cough Class: Print Route: Oral  
  
Prescriptions Sent to Pharmacy Refills  
 azithromycin (ZITHROMAX) 500 mg tab 0 Sig: Take 1 Tab by mouth daily for 7 days. Class: Normal  
 Pharmacy: [address] [place] [address] Adam Rain [facility] #: [phone] Route: Oral  
  
To-Do List   
 12/04/2017 Imaging:  XR CHEST PA LAT Patient Instructions Upper Respiratory Infection (Cold): Care Instructions Your Care Instructions An upper respiratory infection, or URI, is an infection of the nose, sinuses, or throat. URIs are spread by coughs, sneezes, and direct contact. The common cold is the most frequent kind of URI. The flu and sinus infections are other kinds of URIs. Almost all URIs are caused by viruses. Antibiotics won't cure them. But you can treat most infections with home care. This may include drinking lots of fluids and taking over-the-counter pain medicine. You will probably feel better in 4 to 10 days. The doctor has checked you carefully, but problems can develop later. If you notice any problems or new symptoms, get medical treatment right away. Follow-up care is a key part of your treatment and safety. Be sure to make and go to all appointments, and call your doctor if you are having problems. It's also a good idea to know your test results and keep a list of the medicines you take. How can you care for yourself at home? · To prevent dehydration, drink plenty of fluids, enough so that your urine is light yellow or clear like water. Choose water and other caffeine-free clear liquids until you feel better. If you have kidney, heart, or liver disease and have to limit fluids, talk with your doctor before you increase the amount of fluids you drink. · Take an over-the-counter pain medicine, such as acetaminophen (Tylenol), ibuprofen (Advil, Motrin), or naproxen (Aleve). Read and follow all instructions on the label. · Before you use cough and cold medicines, check the label. These medicines may not be safe for young children or for people with certain health problems. · Be careful when taking over-the-counter cold or flu medicines and Tylenol at the same time. Many of these medicines have acetaminophen, which is Tylenol. Read the labels to make sure that you are not taking more than the recommended dose. Too much acetaminophen (Tylenol) can be harmful. · Get plenty of rest. 
· Do not smoke or allow others to smoke around you. If you need help quitting, talk to your doctor about stop-smoking programs and medicines. These can increase your chances of quitting for good. When should you call for help? Call 911 anytime you think you may need emergency care. For example, call if: 
? · You have severe trouble breathing. ?Call your doctor now or seek immediate medical care if: 
? · You seem to be getting much sicker. ? · You have new or worse trouble breathing. ? · You have a new or higher fever. ? · You have a new rash. ? Watch closely for changes in your health, and be sure to contact your doctor if: 
? · You have a new symptom, such as a sore throat, an earache, or sinus pain. ? · You cough more deeply or more often, especially if you notice more mucus or a change in the color of your mucus. ? · You do not get better as expected. Where can you learn more? Go to http://gilda-michael.info/. Enter J678 in the search box to learn more about \"Upper Respiratory Infection (Cold): Care Instructions. \" Current as of: May 12, 2017 Content Version: 11.4 © 8002-3755 Healthwise, [employer].  Care instructions adapted under license by [employer] (which disclaims liability or warranty for this information). If you have questions about a medical condition or this instruction, always ask your healthcare professional. [address] any warranty or liability for your use of this information. Introducing [facility]! Dear Rabia Walters: Thank you for requesting a Clearwire account. Our records indicate that you already have an active Clearwire account. You can access your account anytime at [email or web address] Did you know that you can access your hospital and ER discharge instructions at any time in Clearwire? You can also review all of your test results from your hospital stay or ER visit. Additional Information If you have questions, please visit the Frequently Asked Questions section of the Clearwire website at [email or web address] Remember, Clearwire is NOT to be used for urgent needs. For medical emergencies, dial 911. Now available from your iPhone and Android! Please provide this summary of care documentation to your next provider. Your primary care clinician is listed as Ananth 51. If you have any questions after today's visit, please call [phone].

## 2017-12-04 NOTE — PATIENT INSTRUCTIONS
Upper Respiratory Infection (Cold): Care Instructions  Your Care Instructions    An upper respiratory infection, or URI, is an infection of the nose, sinuses, or throat. URIs are spread by coughs, sneezes, and direct contact. The common cold is the most frequent kind of URI. The flu and sinus infections are other kinds of URIs. Almost all URIs are caused by viruses. Antibiotics won't cure them. But you can treat most infections with home care. This may include drinking lots of fluids and taking over-the-counter pain medicine. You will probably feel better in 4 to 10 days. The doctor has checked you carefully, but problems can develop later. If you notice any problems or new symptoms, get medical treatment right away. Follow-up care is a key part of your treatment and safety. Be sure to make and go to all appointments, and call your doctor if you are having problems. It's also a good idea to know your test results and keep a list of the medicines you take. How can you care for yourself at home? · To prevent dehydration, drink plenty of fluids, enough so that your urine is light yellow or clear like water. Choose water and other caffeine-free clear liquids until you feel better. If you have kidney, heart, or liver disease and have to limit fluids, talk with your doctor before you increase the amount of fluids you drink. · Take an over-the-counter pain medicine, such as acetaminophen (Tylenol), ibuprofen (Advil, Motrin), or naproxen (Aleve). Read and follow all instructions on the label. · Before you use cough and cold medicines, check the label. These medicines may not be safe for young children or for people with certain health problems. · Be careful when taking over-the-counter cold or flu medicines and Tylenol at the same time. Many of these medicines have acetaminophen, which is Tylenol. Read the labels to make sure that you are not taking more than the recommended dose.  Too much acetaminophen (Tylenol) can be harmful. · Get plenty of rest.  · Do not smoke or allow others to smoke around you. If you need help quitting, talk to your doctor about stop-smoking programs and medicines. These can increase your chances of quitting for good. When should you call for help? Call 911 anytime you think you may need emergency care. For example, call if:  ? · You have severe trouble breathing. ?Call your doctor now or seek immediate medical care if:  ? · You seem to be getting much sicker. ? · You have new or worse trouble breathing. ? · You have a new or higher fever. ? · You have a new rash. ? Watch closely for changes in your health, and be sure to contact your doctor if:  ? · You have a new symptom, such as a sore throat, an earache, or sinus pain. ? · You cough more deeply or more often, especially if you notice more mucus or a change in the color of your mucus. ? · You do not get better as expected. Where can you learn more? Go to http://gilda-michael.info/. Enter Z841 in the search box to learn more about \"Upper Respiratory Infection (Cold): Care Instructions. \"  Current as of: May 12, 2017  Content Version: 11.4  © 0800-4097 Healthwise, Incorporated. Care instructions adapted under license by [employer] (which disclaims liability or warranty for this information). If you have questions about a medical condition or this instruction, always ask your healthcare professional. [place] [address] any warranty or liability for your use of this information.

## 2017-12-20 RX ORDER — VERAPAMIL HYDROCHLORIDE 360 MG/1
360 CAPSULE, DELAYED RELEASE PELLETS ORAL DAILY
Qty: 90 CAP | Refills: 2 | Status: SHIPPED | OUTPATIENT
Start: 2017-12-20 | End: 2018-06-13 | Stop reason: SDUPTHER

## 2017-12-27 RX ORDER — DEXLANSOPRAZOLE 60 MG/1
60 CAPSULE, DELAYED RELEASE ORAL DAILY
Qty: 90 CAP | Refills: 3 | Status: SHIPPED | OUTPATIENT
Start: 2017-12-27 | End: 2018-01-24 | Stop reason: SDUPTHER

## 2018-01-24 ENCOUNTER — OFFICE VISIT (OUTPATIENT)
Dept: FAMILY MEDICINE CLINIC | Age: 73
End: 2018-01-24

## 2018-01-24 VITALS
DIASTOLIC BLOOD PRESSURE: 90 MMHG | SYSTOLIC BLOOD PRESSURE: 152 MMHG | RESPIRATION RATE: 18 BRPM | WEIGHT: 147.6 LBS | TEMPERATURE: 98 F | OXYGEN SATURATION: 99 % | HEART RATE: 74 BPM | HEIGHT: 67 IN | BODY MASS INDEX: 23.17 KG/M2

## 2018-01-24 DIAGNOSIS — S09.90XA TRAUMATIC INJURY OF HEAD, INITIAL ENCOUNTER: ICD-10-CM

## 2018-01-24 DIAGNOSIS — M19.90 GENERALIZED ARTHRITIS: ICD-10-CM

## 2018-01-24 DIAGNOSIS — I10 ESSENTIAL HYPERTENSION: Primary | ICD-10-CM

## 2018-01-24 DIAGNOSIS — F32.89 OTHER DEPRESSION: ICD-10-CM

## 2018-01-24 RX ORDER — DEXLANSOPRAZOLE 60 MG/1
60 CAPSULE, DELAYED RELEASE ORAL DAILY
Qty: 90 CAP | Refills: 2 | Status: SHIPPED | OUTPATIENT
Start: 2018-01-24 | End: 2018-12-22 | Stop reason: SDUPTHER

## 2018-01-24 RX ORDER — RIBOFLAVIN (VITAMIN B2) 100 MG
200 TABLET ORAL 2 TIMES DAILY
Qty: 360 TAB | Refills: 2 | Status: SHIPPED | OUTPATIENT
Start: 2018-01-24 | End: 2018-02-26 | Stop reason: ALTCHOICE

## 2018-01-24 RX ORDER — DESONIDE 0.5 MG/ML
LOTION TOPICAL 2 TIMES DAILY
COMMUNITY
End: 2019-03-19

## 2018-01-24 RX ORDER — DULOXETIN HYDROCHLORIDE 30 MG/1
30 CAPSULE, DELAYED RELEASE ORAL 2 TIMES DAILY
COMMUNITY
End: 2019-03-19

## 2018-01-24 RX ORDER — BUPROPION HYDROCHLORIDE 100 MG/1
100 TABLET ORAL 2 TIMES DAILY
COMMUNITY
End: 2019-03-19

## 2018-01-24 RX ORDER — LEVOTHYROXINE SODIUM 50 UG/1
50 TABLET ORAL
Qty: 90 TAB | Refills: 2 | Status: SHIPPED | OUTPATIENT
Start: 2018-01-24 | End: 2018-06-13 | Stop reason: SDUPTHER

## 2018-01-24 RX ORDER — CHOLECALCIFEROL (VITAMIN D3) 125 MCG
CAPSULE ORAL
COMMUNITY
End: 2018-02-26 | Stop reason: ALTCHOICE

## 2018-01-24 RX ORDER — CHLORTHALIDONE 25 MG/1
25 TABLET ORAL DAILY
Qty: 90 TAB | Refills: 1 | Status: SHIPPED | OUTPATIENT
Start: 2018-01-24 | End: 2018-02-26 | Stop reason: ALTCHOICE

## 2018-01-24 NOTE — PROGRESS NOTES
Sade Matson is a 67 y.o. female is here for a bump on her head, she hit her head on her car door. She also has complains of memory loss. 1. Have you been to the ER, urgent care clinic since your last visit? Hospitalized since your last visit?urgent care for migraine     2. Have you seen or consulted any other health care providers outside of the [address] [place] [address] since your last visit? Include any pap smears or colon screening.  rheumatology and psychiatry last week,     Health Maintenance Due   Topic Date Due    GLAUCOMA SCREENING Q2Y  07/07/2012

## 2018-01-24 NOTE — PROGRESS NOTES
Assessment/Plan:    *Diagnoses and all orders for this visit:    1. Essential hypertension  -     chlorthalidone (HYGROTEN) 25 mg tablet; Take 1 Tab by mouth daily. 2. Traumatic injury of head, initial encounter    3. Generalized arthritis    4. Other depression    Other orders  -     Dexlansoprazole (DEXILANT) 60 mg CpDB; Take 1 Cap by mouth daily. -     levothyroxine (SYNTHROID) 50 mcg tablet; Take 1 Tab by mouth Daily (before breakfast). -     riboflavin, vitamin B2, (VITAMIN B-2) 100 mg tablet; Take 2 Tabs by mouth two (2) times a day. Indications: MIGRAINE PREVENTION        Will start her on Chlorthalidone for HTN. Cont other meds the same. F/u 3-4 weeks. The plan was discussed with the patient. The patient verbalized understanding and is in agreement with the plan. All medication potential side effects were discussed with the patient.    -------------------------------------------------------------------------------------------------------------------        Gerard Andersen is a 67 y.o. female and presents with Mass (bump on head ) and Memory Loss         Subjective:  Pt here for f/u. Has been to see the rheumatologist.  She was referred there for a + CCP Ab test.  The final comment was that she does not have rheumatoid arthritis. She just has generalized arthritis. She had an incident 3-4 weeks ago where she was getting out of her car, was hit in the head by her car door as it swung back at her. She has a swelling on the RT side of head, thinks it was larger than it is now, is definitely not as painful now as it was. She still has some pain when the area is touched. HTN: has not been well controlled at home. High here today as well. Depression: has been seeing a psychiatrist now. Meds changed to cymbalta and Wellbutrin, has only been 2 days. ROS:  Constitutional: No recent weight change. No weakness/fatigue. No f/c.    Skin: No rashes, change in nails/hair, itching   HENT: No HA, dizziness. No hearing loss/tinnitus. No nasal congestion/discharge. Eyes: No change in vision, double/blurred vision or eye pain/redness. Cardiovascular: No CP/palpitations. No BURCH/orthopnea/PND. Respiratory: No cough/sputum, dyspnea, wheezing. Gastointestinal: No dysphagia, reflux. No n/v. No constipation/diarrhea. No melena/rectal bleeding. Genitourinary: No dysuria, urinary hesitancy, nocturia, hematuria. No incontinence. Musculoskeletal: No joint pain/stiffness. No muscle pain/tenderness. Endo: No heat/cold intolerance, no polyuria/polydypsia. Heme: No h/o anemia. No easy bleeding/bruising. Allergy/Immunology: No seasonal rhinitis. Denies frequent colds, sinus/ear infections. Neurological: No seizures/numbness/weakness. No paresthesias. Psychiatric:  No depression, anxiety. The problem list was updated as a part of today's visit.   Patient Active Problem List   Diagnosis Code    Hyperlipidemia E78.5    Connective tissue disease ([facility] [address] M35.9    Depression F32.9    Hypertension I10    HH (hiatus hernia) K44.9    Gastroesophageal reflux disease K21.9    Rosacea L71.9    Capsulitis M77.9    Degeneration of intervertebral disc of cervical region M50.30    Allergy T78.40XA    Colitis, ischemic ([facility] [address] K55.9    Migraine G43.909    Anxiety associated with depression F41.8    Raynaud's syndrome I73.00    Hypothyroidism E03.9    Pulmonary nodules R91.8    Renal cyst N28.1    Hypovitaminosis D E55.9    Biliary dyskinesia K82.8    Chronic tension-type headache, intractable G44.221    Osteoarthritis M19.90    Vascular insufficiency of intestine (HCC) K55.9    Dyslipidemia E78.5    Classical migraine with intractable migraine G43.119    Rotator cuff tear arthropathy M12.819    Lower gastrointestinal hemorrhage K92.2    Cervico-occipital neuralgia M54.81    Shoulder pain M25.519    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 SH:  Social History   Substance Use Topics    Smoking status: Never Smoker    Smokeless tobacco: Never Used    Alcohol use No       Medications/Allergies:  Current Outpatient Prescriptions on File Prior to Visit   Medication Sig Dispense Refill    verapamil ER (VERELAN) 360 mg ER capsule Take 1 Cap by mouth daily. 90 Cap 2    atorvastatin (LIPITOR) 20 mg tablet TAKE 1 TABLET (20 MG) BY MOUTH ONCE DAILY 90 Tab 1    loratadine (CLARITIN) 10 mg tablet Take 10 mg by mouth.  montelukast (SINGULAIR) 10 mg tablet Take 10 mg by mouth daily.  ascorbic acid, vitamin C, (VITAMIN C) 500 mg tablet Take 1,000 mg by mouth daily.  topiramate (TOPAMAX) 25 mg tablet Take 50 mg by mouth three (3) times daily. 1 tab in am 2 tab pm      naratriptan (AMERGE) 2.5 mg tab Take 2.5 mg by mouth once as needed.  calcium-cholecalciferol, d3, (CALCIUM 600 + D) 600-125 mg-unit tab Take  by mouth two (2) times a day.  coenzyme q10 (CHEW Q) 100 mg chew Take  by mouth daily.  multivitamin (ONE A DAY) tablet Take 1 Tab by mouth daily.  magnesium Tab Take  by mouth two (2) times a day. No current facility-administered medications on file prior to visit.          Allergies   Allergen Reactions    Pcn [Penicillins] Rash     Was tested is no longer allergic         Health Maintenance:   Health Maintenance   Topic Date Due    GLAUCOMA SCREENING Q2Y  07/07/2012    MEDICARE YEARLY EXAM  06/08/2018    BREAST CANCER SCRN MAMMOGRAM  08/03/2019    COLONOSCOPY  07/07/2022    DTaP/Tdap/Td series (3 - Td) 04/22/2026    Hepatitis C Screening  Completed    OSTEOPOROSIS SCREENING (DEXA)  Completed    ZOSTER VACCINE AGE 60>  Completed    Pneumococcal 65+ Low/Medium Risk  Completed    Influenza Age 5 to Adult  Completed Objective:  Visit Vitals    /90 (BP 1 Location: Left arm, BP Patient Position: Sitting)    Pulse 74    Temp 98 °F (36.7 °C)    Resp 18    Ht 5' 7\" (1.702 m)    Wt 147 lb 9.6 oz (67 kg)    SpO2 99%    BMI 23.12 kg/m2          Nurses notes and VS reviewed. Physical Examination: General appearance - alert, well appearing, and in no distress  Chest - clear to auscultation, no wheezes, rales or rhonchi, symmetric air entry  Heart - normal rate, regular rhythm, normal S1, S2, no murmurs, rubs, clicks or gallops          Labwork and Ancillary Studies:    CBC w/Diff  Lab Results   Component Value Date/Time    WBC 6.9 06/09/2017 11:40 AM    HGB 16.1 06/09/2017 11:40 AM    PLATELET 295 66/37/4061 11:40 AM         Basic Metabolic Profile  Lab Results   Component Value Date/Time    Sodium 140 06/09/2017 11:40 AM    Potassium 3.8 06/09/2017 11:40 AM    Chloride 107 06/09/2017 11:40 AM    CO2 24 06/09/2017 11:40 AM    Anion gap 9 06/09/2017 11:40 AM    Glucose 107 06/09/2017 11:40 AM    BUN 16 06/09/2017 11:40 AM    Creatinine 0.96 06/09/2017 11:40 AM    BUN/Creatinine ratio 17 06/09/2017 11:40 AM    GFR est AA >60 06/09/2017 11:40 AM    GFR est non-AA 57 06/09/2017 11:40 AM    Calcium 9.6 06/09/2017 11:40 AM         LFT  Lab Results   Component Value Date/Time    ALT (SGPT) 17 06/09/2017 11:40 AM    AST (SGOT) 16 06/09/2017 11:40 AM    Alk.  phosphatase 70 06/09/2017 11:40 AM    Bilirubin, direct 0.1 06/09/2017 11:40 AM    Bilirubin, total 0.4 06/09/2017 11:40 AM         Cholesterol  Lab Results   Component Value Date/Time    Cholesterol, total 167 06/09/2017 11:40 AM    HDL Cholesterol 69 06/09/2017 11:40 AM    LDL, calculated 83 06/09/2017 11:40 AM    Triglyceride 75 06/09/2017 11:40 AM    CHOL/HDL Ratio 2.4 06/09/2017 11:40 AM

## 2018-01-24 NOTE — MR AVS SNAPSHOT
[address] [place] [address]  [address] [facility] [address] [phone] Patient: Rhett Boyce MRN: [id number] :1945 Visit Information Date & Time Provider Department Dept. Phone Encounter #  
 2018  1:30 PM Bucky Som, 3 [facility] [phone] Upcoming Health Maintenance Date Due  
 GLAUCOMA SCREENING Q2Y 2012 MEDICARE YEARLY EXAM 2018 BREAST CANCER SCRN MAMMOGRAM 8/3/2019 COLONOSCOPY 2022 DTaP/Tdap/Td series (3 - Td) 2026 Allergies as of 2018  Review Complete On: 2018 By: Julien Germain LPN Severity Noted Reaction Type Reactions Pcn [Penicillins]  2010    Rash Was tested is no longer allergic Current Immunizations  Reviewed on 2016 Name Date Influenza High Dose Vaccine PF 10/6/2017, 10/27/2016  9:24 AM, 10/19/2015 10:00 AM  
 Influenza Vaccine 2014 Influenza Vaccine Split 10/16/2012, 2010 Influenza Vaccine Whole 10/5/2011 PPD 2010 Pneumococcal Polysaccharide (PPSV-23) 2014, 2014 TDAP Vaccine 2010 Td 2010 12:00 AM  
 Td, Adsorbed PF 2010 Tdap 2016 ZZZ-RETIRED (DO NOT USE) Pneumococcal Vaccine (Unspecified Type) 2007 Zoster 2009 Not reviewed this visit You Were Diagnosed With   
  
 Codes Comments Traumatic injury of head, initial encounter    -  Primary ICD-10-CM: S09. 90XA ICD-9-CM: 959.01 Generalized arthritis     ICD-10-CM: M19.90 ICD-9-CM: 716.90 Other depression     ICD-10-CM: F32.89 ICD-9-CM: 801 Vitals BP Pulse Temp Resp Height(growth percentile) Weight(growth percentile) 152/90 (BP 1 Location: Left arm, BP Patient Position: Sitting) 74 98 °F (36.7 °C) 18 5' 7\" (1.702 m) 147 lb 9.6 oz (67 kg) SpO2 BMI OB Status Smoking Status 99% 23.12 kg/m2 Postmenopausal Never Smoker BMI and BSA Data Body Mass Index Body Surface Area  
 23.12 kg/m 2 1.78 m 2 Preferred Pharmacy Pharmacy Name Phone [id number] Neeraj Valenzuela [phone] Your Updated Medication List  
  
   
This list is accurate as of: 1/24/18  2:12 PM.  Always use your most recent med list.  
  
  
  
  
 atorvastatin 20 mg tablet Commonly known as:  LIPITOR  
TAKE 1 TABLET (20 MG) BY MOUTH ONCE DAILY CALCIUM 600 + D 600-125 mg-unit Tab Generic drug:  calcium-cholecalciferol (d3) Take  by mouth two (2) times a day. CHEW Q 100 mg Chew Generic drug:  coenzyme q10 Take  by mouth daily. chlorthalidone 25 mg tablet Commonly known as:  Mark Anthony Crater Take 1 Tab by mouth daily. CYMBALTA 30 mg capsule Generic drug:  DULoxetine Take 30 mg by mouth two (2) times a day. desonide 0.05 % topical lotion Commonly known as:  Carlito Couch Apply  to affected area two (2) times a day. Dexlansoprazole 60 mg Cpdb Commonly known as:  DEXILANT Take 1 Cap by mouth daily. FISH OIL EXTRA STRENGTH PO Take  by mouth.  
  
 levothyroxine 50 mcg tablet Commonly known as:  SYNTHROID Take 1 Tab by mouth Daily (before breakfast). loratadine 10 mg tablet Commonly known as:  Levonia Beams Take 10 mg by mouth.  
  
 magnesium Tab Take  by mouth two (2) times a day. multivitamin tablet Commonly known as:  ONE A DAY Take 1 Tab by mouth daily. naratriptan 2.5 mg Tab Commonly known as:  Tae Lo Take 2.5 mg by mouth once as needed. riboflavin (vitamin B2) 100 mg tablet Commonly known as:  VITAMIN B-2 Take 2 Tabs by mouth two (2) times a day. Indications: MIGRAINE PREVENTION  
  
 SINGULAIR 10 mg tablet Generic drug:  montelukast  
Take 10 mg by mouth daily. topiramate 25 mg tablet Commonly known as:  TOPAMAX Take 50 mg by mouth three (3) times daily.  1 tab in am 2 tab pm  
  
 verapamil  mg ER capsule Commonly known as:  Garry Marquez Take 1 Cap by mouth daily. VITAMIN C 500 mg tablet Generic drug:  ascorbic acid (vitamin C) Take 1,000 mg by mouth daily. VITAMIN D3 2,000 unit Tab Generic drug:  cholecalciferol (vitamin D3) Take  by mouth. WELLBUTRIN 100 mg tablet Generic drug:  buPROPion Take 100 mg by mouth two (2) times a day. Prescriptions Sent to Pharmacy Refills Dexlansoprazole (DEXILANT) 60 mg CpDB 2 Sig: Take 1 Cap by mouth daily. Class: Normal  
 Pharmacy: [address], [address] Ph #: [phone] Route: Oral  
 levothyroxine (SYNTHROID) 50 mcg tablet 2 Sig: Take 1 Tab by mouth Daily (before breakfast). Class: Normal  
 Pharmacy: [address], [address] Ph #: [phone] Route: Oral  
 riboflavin, vitamin B2, (VITAMIN B-2) 100 mg tablet 2 Sig: Take 2 Tabs by mouth two (2) times a day. Indications: MIGRAINE PREVENTION Class: Normal  
 Pharmacy: [address], [address] Ph #: [phone] Route: Oral  
 chlorthalidone (HYGROTEN) 25 mg tablet 1 Sig: Take 1 Tab by mouth daily. Class: Normal  
 Pharmacy: [address] [place], [place] [address]  Ph #: [phone] Route: Oral  
  
Patient Instructions High Blood Pressure: Care Instructions Your Care Instructions If your blood pressure is usually above 140/90, you have high blood pressure, or hypertension. That means the top number is 140 or higher or the bottom number is 90 or higher, or both. Despite what a lot of people think, high blood pressure usually doesn't cause headaches or make you feel dizzy or lightheaded. It usually has no symptoms.  But it does increase your risk for heart attack, stroke, and kidney or eye damage. The higher your blood pressure, the more your risk increases. Your doctor will give you a goal for your blood pressure. Your goal will be based on your health and your age. An example of a goal is to keep your blood pressure below 140/90. Lifestyle changes, such as eating healthy and being active, are always important to help lower blood pressure. You might also take medicine to reach your blood pressure goal. 
Follow-up care is a key part of your treatment and safety. Be sure to make and go to all appointments, and call your doctor if you are having problems. It's also a good idea to know your test results and keep a list of the medicines you take. How can you care for yourself at home? Medical treatment · If you stop taking your medicine, your blood pressure will go back up. You may take one or more types of medicine to lower your blood pressure. Be safe with medicines. Take your medicine exactly as prescribed. Call your doctor if you think you are having a problem with your medicine. · Talk to your doctor before you start taking aspirin every day. Aspirin can help certain people lower their risk of a heart attack or stroke. But taking aspirin isn't right for everyone, because it can cause serious bleeding. · See your doctor regularly. You may need to see the doctor more often at first or until your blood pressure comes down. · If you are taking blood pressure medicine, talk to your doctor before you take decongestants or anti-inflammatory medicine, such as ibuprofen. Some of these medicines can raise blood pressure. · Learn how to check your blood pressure at home. Lifestyle changes · Stay at a healthy weight. This is especially important if you put on weight around the waist. Losing even 10 pounds can help you lower your blood pressure. · If your doctor recommends it, get more exercise. Walking is a good choice. Bit by bit, increase the amount you walk every day.  Try for at least 30 minutes on most days of the week. You also may want to swim, bike, or do other activities. · Avoid or limit alcohol. Talk to your doctor about whether you can drink any alcohol. · Try to limit how much sodium you eat to less than 2,300 milligrams (mg) a day. Your doctor may ask you to try to eat less than 1,500 mg a day. · Eat plenty of fruits (such as bananas and oranges), vegetables, legumes, whole grains, and low-fat dairy products. · Lower the amount of saturated fat in your diet. Saturated fat is found in animal products such as milk, cheese, and meat. Limiting these foods may help you lose weight and also lower your risk for heart disease. · Do not smoke. Smoking increases your risk for heart attack and stroke. If you need help quitting, talk to your doctor about stop-smoking programs and medicines. These can increase your chances of quitting for good. When should you call for help? Call 911 anytime you think you may need emergency care. This may mean having symptoms that suggest that your blood pressure is causing a serious heart or blood vessel problem. Your blood pressure may be over 180/110. ? For example, call 911 if: 
? · You have symptoms of a heart attack. These may include: ¨ Chest pain or pressure, or a strange feeling in the chest. 
¨ Sweating. ¨ Shortness of breath. ¨ Nausea or vomiting. ¨ Pain, pressure, or a strange feeling in the back, neck, jaw, or upper belly or in one or both shoulders or arms. ¨ Lightheadedness or sudden weakness. ¨ A fast or irregular heartbeat. ? · You have symptoms of a stroke. These may include: 
¨ Sudden numbness, tingling, weakness, or loss of movement in your face, arm, or leg, especially on only one side of your body. ¨ Sudden vision changes. ¨ Sudden trouble speaking. ¨ Sudden confusion or trouble understanding simple statements. ¨ Sudden problems with walking or balance. ¨ A sudden, severe headache that is different from past headaches. ? · You have severe back or belly pain. ?Do not wait until your blood pressure comes down on its own. Get help right away. ?Call your doctor now or seek immediate care if: 
? · Your blood pressure is much higher than normal (such as 180/110 or higher), but you don't have symptoms. ? · You think high blood pressure is causing symptoms, such as: ¨ Severe headache. ¨ Blurry vision. ? Watch closely for changes in your health, and be sure to contact your doctor if: 
? · Your blood pressure measures 140/90 or higher at least 2 times. That means the top number is 140 or higher or the bottom number is 90 or higher, or both. ? · You think you may be having side effects from your blood pressure medicine. ? · Your blood pressure is usually normal, but it goes above normal at least 2 times. Where can you learn more? Go to http://gilda-michael.info/. Enter Z542 in the search box to learn more about \"High Blood Pressure: Care Instructions. \" Current as of: September 21, 2016 Content Version: 11.4 © 0126-3037 [employer]. Care instructions adapted under license by [employer] (which disclaims liability or warranty for this information). If you have questions about a medical condition or this instruction, always ask your healthcare professional. [address] any warranty or liability for your use of this information. High Blood Pressure: Care Instructions Your Care Instructions If your blood pressure is usually above 140/90, you have high blood pressure, or hypertension. That means the top number is 140 or higher or the bottom number is 90 or higher, or both. Despite what a lot of people think, high blood pressure usually doesn't cause headaches or make you feel dizzy or lightheaded. It usually has no symptoms.  But it does increase your risk for heart attack, stroke, and ¨ A sudden, severe headache that is different from past headaches. ? · You have severe back or belly pain. ?Do not wait until your blood pressure comes down on its own. Get help right away. ?Call your doctor now or seek immediate care if: 
? · Your blood pressure is much higher than normal (such as 180/110 or higher), but you don't have symptoms. ? · You think high blood pressure is causing symptoms, such as: ¨ Severe headache. ¨ Blurry vision. ? Watch closely for changes in your health, and be sure to contact your doctor if: 
? · Your blood pressure measures 140/90 or higher at least 2 times. That means the top number is 140 or higher or the bottom number is 90 or higher, or both. ? · You think you may be having side effects from your blood pressure medicine. ? · Your blood pressure is usually normal, but it goes above normal at least 2 times. Where can you learn more? Go to http://gilda-michael.info/. Enter F786 in the search box to learn more about \"High Blood Pressure: Care Instructions. \" Current as of: September 21, 2016 Content Version: 11.4 © 5450-6849 [employer]. Care instructions adapted under license by [employer] (which disclaims liability or warranty for this information). If you have questions about a medical condition or this instruction, always ask your healthcare professional. [address] any warranty or liability for your use of this information. Introducing [facility]! Dear Carlo Stout: Thank you for requesting a [employer] account. Our records indicate that you already have an active [employer] account. You can access your account anytime at [email or web address] Did you know that you can access your hospital and ER discharge instructions at any time in [employer]? You can also review all of your test results from your hospital stay or ER visit. Additional Information If you have questions, please visit the Frequently Asked Questions section of the [employer] website at [email or web address] Remember, [email or web address] is NOT to be used for urgent needs. For medical emergencies, dial 911. Now available from your iPhone and Android! Please provide this summary of care documentation to your next provider. Your primary care clinician is listed as Ananth Irizarry. If you have any questions after today's visit, please call [phone].

## 2018-01-24 NOTE — PATIENT INSTRUCTIONS
High Blood Pressure: Care Instructions  Your Care Instructions    If your blood pressure is usually above 140/90, you have high blood pressure, or hypertension. That means the top number is 140 or higher or the bottom number is 90 or higher, or both. Despite what a lot of people think, high blood pressure usually doesn't cause headaches or make you feel dizzy or lightheaded. It usually has no symptoms. But it does increase your risk for heart attack, stroke, and kidney or eye damage. The higher your blood pressure, the more your risk increases. Your doctor will give you a goal for your blood pressure. Your goal will be based on your health and your age. An example of a goal is to keep your blood pressure below 140/90. Lifestyle changes, such as eating healthy and being active, are always important to help lower blood pressure. You might also take medicine to reach your blood pressure goal.  Follow-up care is a key part of your treatment and safety. Be sure to make and go to all appointments, and call your doctor if you are having problems. It's also a good idea to know your test results and keep a list of the medicines you take. How can you care for yourself at home? Medical treatment  · If you stop taking your medicine, your blood pressure will go back up. You may take one or more types of medicine to lower your blood pressure. Be safe with medicines. Take your medicine exactly as prescribed. Call your doctor if you think you are having a problem with your medicine. · Talk to your doctor before you start taking aspirin every day. Aspirin can help certain people lower their risk of a heart attack or stroke. But taking aspirin isn't right for everyone, because it can cause serious bleeding. · See your doctor regularly. You may need to see the doctor more often at first or until your blood pressure comes down.   · If you are taking blood pressure medicine, talk to your doctor before you take decongestants or anti-inflammatory medicine, such as ibuprofen. Some of these medicines can raise blood pressure. · Learn how to check your blood pressure at home. Lifestyle changes  · Stay at a healthy weight. This is especially important if you put on weight around the waist. Losing even 10 pounds can help you lower your blood pressure. · If your doctor recommends it, get more exercise. Walking is a good choice. Bit by bit, increase the amount you walk every day. Try for at least 30 minutes on most days of the week. You also may want to swim, bike, or do other activities. · Avoid or limit alcohol. Talk to your doctor about whether you can drink any alcohol. · Try to limit how much sodium you eat to less than 2,300 milligrams (mg) a day. Your doctor may ask you to try to eat less than 1,500 mg a day. · Eat plenty of fruits (such as bananas and oranges), vegetables, legumes, whole grains, and low-fat dairy products. · Lower the amount of saturated fat in your diet. Saturated fat is found in animal products such as milk, cheese, and meat. Limiting these foods may help you lose weight and also lower your risk for heart disease. · Do not smoke. Smoking increases your risk for heart attack and stroke. If you need help quitting, talk to your doctor about stop-smoking programs and medicines. These can increase your chances of quitting for good. When should you call for help? Call 911 anytime you think you may need emergency care. This may mean having symptoms that suggest that your blood pressure is causing a serious heart or blood vessel problem. Your blood pressure may be over 180/110. ? For example, call 911 if:  ? · You have symptoms of a heart attack. These may include:  ¨ Chest pain or pressure, or a strange feeling in the chest.  ¨ Sweating. ¨ Shortness of breath. ¨ Nausea or vomiting.   ¨ Pain, pressure, or a strange feeling in the back, neck, jaw, or upper belly or in one or both shoulders or arms.  ¨ Lightheadedness or sudden weakness. ¨ A fast or irregular heartbeat. ? · You have symptoms of a stroke. These may include:  ¨ Sudden numbness, tingling, weakness, or loss of movement in your face, arm, or leg, especially on only one side of your body. ¨ Sudden vision changes. ¨ Sudden trouble speaking. ¨ Sudden confusion or trouble understanding simple statements. ¨ Sudden problems with walking or balance. ¨ A sudden, severe headache that is different from past headaches. ? · You have severe back or belly pain. ?Do not wait until your blood pressure comes down on its own. Get help right away. ?Call your doctor now or seek immediate care if:  ? · Your blood pressure is much higher than normal (such as 180/110 or higher), but you don't have symptoms. ? · You think high blood pressure is causing symptoms, such as:  ¨ Severe headache. ¨ Blurry vision. ? Watch closely for changes in your health, and be sure to contact your doctor if:  ? · Your blood pressure measures 140/90 or higher at least 2 times. That means the top number is 140 or higher or the bottom number is 90 or higher, or both. ? · You think you may be having side effects from your blood pressure medicine. ? · Your blood pressure is usually normal, but it goes above normal at least 2 times. Where can you learn more? Go to http://gilda-michael.info/. Enter Z929 in the search box to learn more about \"High Blood Pressure: Care Instructions. \"  Current as of: September 21, 2016  Content Version: 11.4  © 4208-5395 [employer]. Care instructions adapted under license by [employer] (which disclaims liability or warranty for this information). If you have questions about a medical condition or this instruction, always ask your healthcare professional. [place] [address] any warranty or liability for your use of this information.        High Blood Pressure: Care Instructions  Your Care Instructions    If your blood pressure is usually above 140/90, you have high blood pressure, or hypertension. That means the top number is 140 or higher or the bottom number is 90 or higher, or both. Despite what a lot of people think, high blood pressure usually doesn't cause headaches or make you feel dizzy or lightheaded. It usually has no symptoms. But it does increase your risk for heart attack, stroke, and kidney or eye damage. The higher your blood pressure, the more your risk increases. Your doctor will give you a goal for your blood pressure. Your goal will be based on your health and your age. An example of a goal is to keep your blood pressure below 140/90. Lifestyle changes, such as eating healthy and being active, are always important to help lower blood pressure. You might also take medicine to reach your blood pressure goal.  Follow-up care is a key part of your treatment and safety. Be sure to make and go to all appointments, and call your doctor if you are having problems. It's also a good idea to know your test results and keep a list of the medicines you take. How can you care for yourself at home? Medical treatment  · If you stop taking your medicine, your blood pressure will go back up. You may take one or more types of medicine to lower your blood pressure. Be safe with medicines. Take your medicine exactly as prescribed. Call your doctor if you think you are having a problem with your medicine. · Talk to your doctor before you start taking aspirin every day. Aspirin can help certain people lower their risk of a heart attack or stroke. But taking aspirin isn't right for everyone, because it can cause serious bleeding. · See your doctor regularly. You may need to see the doctor more often at first or until your blood pressure comes down.   · If you are taking blood pressure medicine, talk to your doctor before you take decongestants or anti-inflammatory medicine, such as ibuprofen. Some of these medicines can raise blood pressure. · Learn how to check your blood pressure at home. Lifestyle changes  · Stay at a healthy weight. This is especially important if you put on weight around the waist. Losing even 10 pounds can help you lower your blood pressure. · If your doctor recommends it, get more exercise. Walking is a good choice. Bit by bit, increase the amount you walk every day. Try for at least 30 minutes on most days of the week. You also may want to swim, bike, or do other activities. · Avoid or limit alcohol. Talk to your doctor about whether you can drink any alcohol. · Try to limit how much sodium you eat to less than 2,300 milligrams (mg) a day. Your doctor may ask you to try to eat less than 1,500 mg a day. · Eat plenty of fruits (such as bananas and oranges), vegetables, legumes, whole grains, and low-fat dairy products. · Lower the amount of saturated fat in your diet. Saturated fat is found in animal products such as milk, cheese, and meat. Limiting these foods may help you lose weight and also lower your risk for heart disease. · Do not smoke. Smoking increases your risk for heart attack and stroke. If you need help quitting, talk to your doctor about stop-smoking programs and medicines. These can increase your chances of quitting for good. When should you call for help? Call 911 anytime you think you may need emergency care. This may mean having symptoms that suggest that your blood pressure is causing a serious heart or blood vessel problem. Your blood pressure may be over 180/110. ? For example, call 911 if:  ? · You have symptoms of a heart attack. These may include:  ¨ Chest pain or pressure, or a strange feeling in the chest.  ¨ Sweating. ¨ Shortness of breath. ¨ Nausea or vomiting. ¨ Pain, pressure, or a strange feeling in the back, neck, jaw, or upper belly or in one or both shoulders or arms. ¨ Lightheadedness or sudden weakness.   ¨ A fast or irregular heartbeat. ? · You have symptoms of a stroke. These may include:  ¨ Sudden numbness, tingling, weakness, or loss of movement in your face, arm, or leg, especially on only one side of your body. ¨ Sudden vision changes. ¨ Sudden trouble speaking. ¨ Sudden confusion or trouble understanding simple statements. ¨ Sudden problems with walking or balance. ¨ A sudden, severe headache that is different from past headaches. ? · You have severe back or belly pain. ?Do not wait until your blood pressure comes down on its own. Get help right away. ?Call your doctor now or seek immediate care if:  ? · Your blood pressure is much higher than normal (such as 180/110 or higher), but you don't have symptoms. ? · You think high blood pressure is causing symptoms, such as:  ¨ Severe headache. ¨ Blurry vision. ? Watch closely for changes in your health, and be sure to contact your doctor if:  ? · Your blood pressure measures 140/90 or higher at least 2 times. That means the top number is 140 or higher or the bottom number is 90 or higher, or both. ? · You think you may be having side effects from your blood pressure medicine. ? · Your blood pressure is usually normal, but it goes above normal at least 2 times. Where can you learn more? Go to http://gilda-michael.info/. Enter W918 in the search box to learn more about \"High Blood Pressure: Care Instructions. \"  Current as of: September 21, 2016  Content Version: 11.4  © 4756-4570 [employer]. Care instructions adapted under license by [employer] (which disclaims liability or warranty for this information). If you have questions about a medical condition or this instruction, always ask your healthcare professional. [place] [address] any warranty or liability for your use of this information.

## 2018-02-26 ENCOUNTER — OFFICE VISIT (OUTPATIENT)
Dept: FAMILY MEDICINE CLINIC | Age: 73
End: 2018-02-26

## 2018-02-26 VITALS
HEART RATE: 86 BPM | TEMPERATURE: 98.6 F | HEIGHT: 67 IN | OXYGEN SATURATION: 99 % | SYSTOLIC BLOOD PRESSURE: 122 MMHG | WEIGHT: 144.2 LBS | BODY MASS INDEX: 22.63 KG/M2 | RESPIRATION RATE: 15 BRPM | DIASTOLIC BLOOD PRESSURE: 88 MMHG

## 2018-02-26 DIAGNOSIS — R42 DIZZINESS: Primary | ICD-10-CM

## 2018-02-26 DIAGNOSIS — I10 ESSENTIAL HYPERTENSION: ICD-10-CM

## 2018-02-26 DIAGNOSIS — F32.89 OTHER DEPRESSION: ICD-10-CM

## 2018-02-26 RX ORDER — DIVALPROEX SODIUM 500 MG/1
500 TABLET, EXTENDED RELEASE ORAL DAILY
Refills: 1 | COMMUNITY
Start: 2018-01-31 | End: 2020-08-31 | Stop reason: ALTCHOICE

## 2018-02-26 RX ORDER — METOPROLOL SUCCINATE 25 MG/1
25 TABLET, EXTENDED RELEASE ORAL DAILY
Qty: 90 TAB | Refills: 0 | Status: SHIPPED | OUTPATIENT
Start: 2018-02-26 | End: 2018-06-13 | Stop reason: ALTCHOICE

## 2018-02-26 RX ORDER — MECLIZINE HYDROCHLORIDE 25 MG/1
TABLET ORAL
Qty: 24 TAB | Refills: 0 | Status: SHIPPED | OUTPATIENT
Start: 2018-02-26 | End: 2018-06-13 | Stop reason: ALTCHOICE

## 2018-02-26 RX ORDER — CEFUROXIME AXETIL 250 MG/1
TABLET ORAL
Refills: 0 | COMMUNITY
Start: 2018-01-31 | End: 2018-06-13 | Stop reason: ALTCHOICE

## 2018-02-26 NOTE — PROGRESS NOTES
Assessment/Plan:    *Diagnoses and all orders for this visit:    1. Dizziness    2. Essential hypertension    3. Other depression    Other orders  -     meclizine (ANTIVERT) 25 mg tablet; 25 mg 3 times daily x 4 days, 25 mg twice daily x 4 days. , 25 daily x 4 days. Indications: Vertigo  -     metoprolol succinate (TOPROL-XL) 25 mg XL tablet; Take 1 Tab by mouth daily. Indications: hypertension        Pt was instructed to cut back off the chlorthalidone. Will reduce to half a tab, monitor the dizziness to see if it improves, then on Friday, will stop the med completely and start Toprol. Will hold on to meclizine Rx and if dizziness does not improve, will start this. Will f/u 3 weeks for BP f/u. The plan was discussed with the patient. The patient verbalized understanding and is in agreement with the plan. All medication potential side effects were discussed with the patient.    -------------------------------------------------------------------------------------------------------------------        Kari Edward is a 67 y.o. female and presents with Follow-up; Hypertension; and Dizziness         Subjective:  Pt here for f/u of HTN. Not doing well on chlorthalidone. She thinks it may be the cause for her dizziness. She also happened to start some other new meds from her psychiatrist (Cymbalta and Wellbutrin) so it was making her wonder, which of the meds could be the culprit. Depression: still working through this and thinks the medications are helping. ROS:  Constitutional: No recent weight change. No weakness/fatigue. No f/c. Skin: No rashes, change in nails/hair, itching   HENT: No HA, dizziness. No hearing loss/tinnitus. No nasal congestion/discharge. Eyes: No change in vision, double/blurred vision or eye pain/redness. Cardiovascular: No CP/palpitations. No BURCH/orthopnea/PND. Respiratory: No cough/sputum, dyspnea, wheezing. Gastointestinal: No dysphagia, reflux. No n/v.   No constipation/diarrhea. No melena/rectal bleeding. Genitourinary: No dysuria, urinary hesitancy, nocturia, hematuria. No incontinence. Musculoskeletal: No joint pain/stiffness. No muscle pain/tenderness. Endo: No heat/cold intolerance, no polyuria/polydypsia. Heme: No h/o anemia. No easy bleeding/bruising. Allergy/Immunology: No seasonal rhinitis. Denies frequent colds, sinus/ear infections. Neurological: No seizures/numbness/weakness. No paresthesias. Psychiatric:  No depression, anxiety. The problem list was updated as a part of today's visit. Patient Active Problem List   Diagnosis Code    Hyperlipidemia E78.5    Connective tissue disease ([facility] [address] M35.9    Depression F32.9    HH (hiatus hernia) K44.9    Gastroesophageal reflux disease K21.9    Rosacea L71.9    Capsulitis M77.9    Degeneration of intervertebral disc of cervical region M50.30    Allergy T78.40XA    Colitis, ischemic ([facility] [address] K55.9    Anxiety associated with depression F41.8    Raynaud's syndrome I73.00    Hypothyroidism E03.9    Pulmonary nodules R91.8    Renal cyst N28.1    Hypovitaminosis D E55.9    Biliary dyskinesia K82.8    Osteoarthritis M19.90    Dyslipidemia E78.5    Classical migraine with intractable migraine G43.119    Rotator cuff tear arthropathy M12.819    Lower gastrointestinal hemorrhage K92.2    Cervico-occipital neuralgia M54.81    History of cervical spinal surgery Z98.890    History of knee surgery Z98.890    Diarrhea R19.7    Osteopenia M85.80    Generalized arthritis M19.90    Essential hypertension I10       The PSH, FH were reviewed. SH:  Social History   Substance Use Topics    Smoking status: Never Smoker    Smokeless tobacco: Never Used    Alcohol use No       Medications/Allergies:  Current Outpatient Prescriptions on File Prior to Visit   Medication Sig Dispense Refill    DULoxetine (CYMBALTA) 30 mg capsule Take 30 mg by mouth two (2) times a day.       desonide (TRIDESILON) 0.05 % topical lotion Apply  to affected area two (2) times a day.  buPROPion (WELLBUTRIN) 100 mg tablet Take 100 mg by mouth two (2) times a day.  Dexlansoprazole (DEXILANT) 60 mg CpDB Take 1 Cap by mouth daily. 90 Cap 2    levothyroxine (SYNTHROID) 50 mcg tablet Take 1 Tab by mouth Daily (before breakfast). 90 Tab 2    verapamil ER (VERELAN) 360 mg ER capsule Take 1 Cap by mouth daily. 90 Cap 2    atorvastatin (LIPITOR) 20 mg tablet TAKE 1 TABLET (20 MG) BY MOUTH ONCE DAILY 90 Tab 1    loratadine (CLARITIN) 10 mg tablet Take 10 mg by mouth.  montelukast (SINGULAIR) 10 mg tablet Take 10 mg by mouth daily.  calcium-cholecalciferol, d3, (CALCIUM 600 + D) 600-125 mg-unit tab Take  by mouth two (2) times a day.  coenzyme q10 (CHEW Q) 100 mg chew Take  by mouth daily.  multivitamin (ONE A DAY) tablet Take 1 Tab by mouth daily.  magnesium Tab Take  by mouth two (2) times a day. No current facility-administered medications on file prior to visit. Allergies   Allergen Reactions    Pcn [Penicillins] Rash     Was tested is no longer allergic         Health Maintenance:   Health Maintenance   Topic Date Due    GLAUCOMA SCREENING Q2Y  07/07/2012    MEDICARE YEARLY EXAM  06/08/2018    BREAST CANCER SCRN MAMMOGRAM  08/03/2019    COLONOSCOPY  07/07/2022    DTaP/Tdap/Td series (3 - Td) 04/22/2026    Hepatitis C Screening  Completed    OSTEOPOROSIS SCREENING (DEXA)  Completed    ZOSTER VACCINE AGE 60>  Completed    Pneumococcal 65+ Low/Medium Risk  Completed    Influenza Age 5 to Adult  Completed       Objective:  Visit Vitals    /88 (BP 1 Location: Left arm, BP Patient Position: Sitting)    Pulse 86    Temp 98.6 °F (37 °C) (Oral)    Resp 15    Ht 5' 7\" (1.702 m)    Wt 144 lb 3.2 oz (65.4 kg)    SpO2 99%    BMI 22.58 kg/m2          Nurses notes and VS reviewed.       Physical Examination: General appearance - alert, well appearing, and in no distress          Labwork and Ancillary Studies:    CBC w/Diff  Lab Results   Component Value Date/Time    WBC 6.9 06/09/2017 11:40 AM    HGB 16.1 (H) 06/09/2017 11:40 AM    PLATELET 601 37/75/6709 11:40 AM         Basic Metabolic Profile  Lab Results   Component Value Date/Time    Sodium 140 06/09/2017 11:40 AM    Potassium 3.8 06/09/2017 11:40 AM    Chloride 107 06/09/2017 11:40 AM    CO2 24 06/09/2017 11:40 AM    Anion gap 9 06/09/2017 11:40 AM    Glucose 107 (H) 06/09/2017 11:40 AM    BUN 16 06/09/2017 11:40 AM    Creatinine 0.96 06/09/2017 11:40 AM    BUN/Creatinine ratio 17 06/09/2017 11:40 AM    GFR est AA >60 06/09/2017 11:40 AM    GFR est non-AA 57 (L) 06/09/2017 11:40 AM    Calcium 9.6 06/09/2017 11:40 AM         LFT  Lab Results   Component Value Date/Time    ALT (SGPT) 17 06/09/2017 11:40 AM    AST (SGOT) 16 06/09/2017 11:40 AM    Alk.  phosphatase 70 06/09/2017 11:40 AM    Bilirubin, direct 0.1 06/09/2017 11:40 AM    Bilirubin, total 0.4 06/09/2017 11:40 AM         Cholesterol  Lab Results   Component Value Date/Time    Cholesterol, total 167 06/09/2017 11:40 AM    HDL Cholesterol 69 (H) 06/09/2017 11:40 AM    LDL, calculated 83 06/09/2017 11:40 AM    Triglyceride 75 06/09/2017 11:40 AM    CHOL/HDL Ratio 2.4 06/09/2017 11:40 AM

## 2018-02-26 NOTE — PROGRESS NOTES
Sade Matson is a 67 y.o.  female presents today for office visit for follow up for hypertension. Pt is in Room # 8.      1. Have you been to the ER, urgent care clinic since your last visit? Hospitalized since your last visit? No    2. Have you seen or consulted any other health care providers outside of the [address] [place] [address] since your last visit? Include any pap smears or colon screening. No    Health Maintenance reviewed.       Upcoming Appts  Neurology 2/28/18

## 2018-02-26 NOTE — MR AVS SNAPSHOT
[address] [place] [address]  [address] [facility] [address] [phone] Patient: Teto Andres MRN: [id number] :1945 Visit Information Date & Time Provider Department Dept. Phone Encounter #  
 2018  3:15 PM Cruzito Smiley, [address] [place] [phone] Upcoming Health Maintenance Date Due  
 GLAUCOMA SCREENING Q2Y 2012 MEDICARE YEARLY EXAM 2018 BREAST CANCER SCRN MAMMOGRAM 8/3/2019 COLONOSCOPY 2022 DTaP/Tdap/Td series (3 - Td) 2026 Allergies as of 2018  Review Complete On: 2018 By: Cruzito Smiley MD  
  
 Severity Noted Reaction Type Reactions Pcn [Penicillins]  2010    Rash Was tested is no longer allergic Current Immunizations  Reviewed on 2016 Name Date Influenza High Dose Vaccine PF 10/6/2017, 10/27/2016  9:24 AM, 10/19/2015 10:00 AM  
 Influenza Vaccine 2014 Influenza Vaccine Split 10/16/2012, 2010 Influenza Vaccine Whole 10/5/2011 PPD 2010 Pneumococcal Polysaccharide (PPSV-23) 2014, 2014 TDAP Vaccine 2010 Td 2010 12:00 AM  
 Td, Adsorbed PF 2010 Tdap 2016 ZZZ-RETIRED (DO NOT USE) Pneumococcal Vaccine (Unspecified Type) 2007 Zoster 2009 Not reviewed this visit Vitals BP Pulse Temp Resp Height(growth percentile) Weight(growth percentile) 122/88 (BP 1 Location: Left arm, BP Patient Position: Sitting) 86 98.6 °F (37 °C) (Oral) 15 5' 7\" (1.702 m) 144 lb 3.2 oz (65.4 kg) SpO2 BMI OB Status Smoking Status 99% 22.58 kg/m2 Postmenopausal Never Smoker Vitals History BMI and BSA Data Body Mass Index Body Surface Area  
 22.58 kg/m 2 1.76 m 2 Preferred Pharmacy Pharmacy Name Phone [id number] Neeraj Le [phone] Your Updated Medication List  
  
   
 This list is accurate as of 18  4:10 PM.  Always use your most recent med list.  
  
  
  
  
 atorvastatin 20 mg tablet Commonly known as:  LIPITOR  
TAKE 1 TABLET (20 MG) BY MOUTH ONCE DAILY CALCIUM 600 + D 600-125 mg-unit Tab Generic drug:  calcium-cholecalciferol (d3) Take  by mouth two (2) times a day. CHEW Q 100 mg Chew Generic drug:  coenzyme q10 Take  by mouth daily. CYMBALTA 30 mg capsule Generic drug:  DULoxetine Take 30 mg by mouth two (2) times a day. desonide 0.05 % topical lotion Commonly known as:  Loreatha David Apply  to affected area two (2) times a day. Dexlansoprazole 60 mg Cpdb Commonly known as:  DEXILANT Take 1 Cap by mouth daily. divalproex  mg ER tablet Commonly known as:  DEPAKOTE ER  
  
 levothyroxine 50 mcg tablet Commonly known as:  SYNTHROID Take 1 Tab by mouth Daily (before breakfast). loratadine 10 mg tablet Commonly known as:  Jet Hackettrick Take 10 mg by mouth.  
  
 magnesium Tab Take  by mouth two (2) times a day. meclizine 25 mg tablet Commonly known as:  ANTIVERT 25 mg 3 times daily x 4 days, 25 mg twice daily x 4 days. , 25 daily x 4 days. Indications: Vertigo  
  
 metoprolol succinate 25 mg XL tablet Commonly known as:  TOPROL-XL Take 1 Tab by mouth daily. Indications: hypertension  
  
 multivitamin tablet Commonly known as:  ONE A DAY Take 1 Tab by mouth daily. SINGULAIR 10 mg tablet Generic drug:  montelukast  
Take 10 mg by mouth daily. SUMAtriptan succinate 6 mg/0.5 mL kit  
  
 verapamil  mg ER capsule Commonly known as:  Nikunj Kimer Take 1 Cap by mouth daily. WELLBUTRIN 100 mg tablet Generic drug:  buPROPion Take 100 mg by mouth two (2) times a day. Prescriptions Printed Refills  
 meclizine (ANTIVERT) 25 mg tablet 0 Si mg 3 times daily x 4 days, 25 mg twice daily x 4 days. , 25 daily x 4 days. Indications: Vertigo Class: Print Prescriptions Sent to Pharmacy Refills  
 metoprolol succinate (TOPROL-XL) 25 mg XL tablet 0 Sig: Take 1 Tab by mouth daily. Indications: hypertension Class: Normal  
 Pharmacy: [facility], [address] [place] [address] Adam Rain 4  #: [phone] Route: Oral  
  
Introducing [facility] SERVICES! Dear Dionna Potter: Thank you for requesting a [employer] account. Our records indicate that you already have an active [employer] account. You can access your account anytime at [email or web address]. [email or web address] Did you know that you can access your hospital and ER discharge instructions at any time in [employer]? You can also review all of your test results from your hospital stay or ER visit. Additional Information If you have questions, please visit the Frequently Asked Questions section of the [employer] website at [email or web address] Remember, [employer] is NOT to be used for urgent needs. For medical emergencies, dial 911. Now available from your iPhone and Android! Please provide this summary of care documentation to your next provider. Your primary care clinician is listed as Ananth 51. If you have any questions after today's visit, please call [phone].

## 2018-03-26 ENCOUNTER — OFFICE VISIT (OUTPATIENT)
Dept: FAMILY MEDICINE CLINIC | Age: 73
End: 2018-03-26

## 2018-03-26 VITALS
OXYGEN SATURATION: 99 % | WEIGHT: 156 LBS | HEIGHT: 67 IN | DIASTOLIC BLOOD PRESSURE: 100 MMHG | SYSTOLIC BLOOD PRESSURE: 140 MMHG | BODY MASS INDEX: 24.48 KG/M2 | HEART RATE: 72 BPM | TEMPERATURE: 97.9 F | RESPIRATION RATE: 16 BRPM

## 2018-03-26 DIAGNOSIS — I10 ESSENTIAL HYPERTENSION: Primary | ICD-10-CM

## 2018-03-26 RX ORDER — NARATRIPTAN 2.5 MG/1
2.5 TABLET ORAL
COMMUNITY

## 2018-03-26 NOTE — MR AVS SNAPSHOT
[address] [address] [address] [facility] [address] [phone] Patient: Manfred Galaviz MRN: [id number] :1945 Visit Information Date & Time Provider Department Dept. Phone Encounter #  
 3/26/2018  2:45 PM Colleen Meier, [address] [place] [phone] Upcoming Health Maintenance Date Due  
 GLAUCOMA SCREENING Q2Y 2012 MEDICARE YEARLY EXAM 2018 BREAST CANCER SCRN MAMMOGRAM 8/3/2019 COLONOSCOPY 2022 DTaP/Tdap/Td series (3 - Td) 2026 Allergies as of 3/26/2018  Review Complete On: 3/26/2018 By: Brit Hanley LPN Severity Noted Reaction Type Reactions Pcn [Penicillins]  2010    Rash Was tested is no longer allergic Current Immunizations  Reviewed on 2016 Name Date Influenza High Dose Vaccine PF 10/6/2017, 10/27/2016  9:24 AM, 10/19/2015 10:00 AM  
 Influenza Vaccine 2014 Influenza Vaccine Split 10/16/2012, 2010 Influenza Vaccine Whole 10/5/2011 PPD 2010 Pneumococcal Polysaccharide (PPSV-23) 2014, 2014 TDAP Vaccine 2010 Td 2010 12:00 AM  
 Td, Adsorbed PF 2010 Tdap 2016 ZZZ-RETIRED (DO NOT USE) Pneumococcal Vaccine (Unspecified Type) 2007 Zoster 2009 Not reviewed this visit You Were Diagnosed With   
  
 Codes Comments Essential hypertension    -  Primary ICD-10-CM: I10 
ICD-9-CM: 401.9 Vitals BP Pulse Temp Resp Height(growth percentile) Weight(growth percentile) (!) 140/100 72 97.9 °F (36.6 °C) (Oral) 16 5' 7\" (1.702 m) 156 lb (70.8 kg) SpO2 BMI OB Status Smoking Status 99% 24.43 kg/m2 Postmenopausal Never Smoker Vitals History BMI and BSA Data Body Mass Index Body Surface Area  
 24.43 kg/m 2 1.83 m 2 Preferred Pharmacy Pharmacy Name Phone [id number] Shamika Freeze, [address] [phone] Your Updated Medication List  
  
   
This list is accurate as of 3/26/18  3:38 PM.  Always use your most recent med list.  
  
  
  
  
 AMERGE 2.5 mg Tab Generic drug:  naratriptan Take 2.5 mg by mouth once as needed. atorvastatin 20 mg tablet Commonly known as:  LIPITOR  
TAKE 1 TABLET (20 MG) BY MOUTH ONCE DAILY CALCIUM 600 + D 600-125 mg-unit Tab Generic drug:  calcium-cholecalciferol (d3) Take  by mouth two (2) times a day. CHEW Q 100 mg Chew Generic drug:  coenzyme q10 Take 200 mg by mouth daily. CYMBALTA 30 mg capsule Generic drug:  DULoxetine Take 30 mg by mouth two (2) times a day. desonide 0.05 % topical lotion Commonly known as:  Katherine Bio Apply  to affected area two (2) times a day. Dexlansoprazole 60 mg Cpdb Commonly known as:  DEXILANT Take 1 Cap by mouth daily. divalproex  mg ER tablet Commonly known as:  DEPAKOTE ER  
  
 levothyroxine 50 mcg tablet Commonly known as:  SYNTHROID Take 1 Tab by mouth Daily (before breakfast). loratadine 10 mg tablet Commonly known as:  Harvis Cardinal Take 10 mg by mouth.  
  
 magnesium Tab Take  by mouth two (2) times a day. meclizine 25 mg tablet Commonly known as:  ANTIVERT 25 mg 3 times daily x 4 days, 25 mg twice daily x 4 days. , 25 daily x 4 days. Indications: Vertigo  
  
 metoprolol succinate 25 mg XL tablet Commonly known as:  TOPROL-XL Take 1 Tab by mouth daily. Indications: hypertension  
  
 multivitamin tablet Commonly known as:  ONE A DAY Take 1 Tab by mouth daily. SINGULAIR 10 mg tablet Generic drug:  montelukast  
Take 10 mg by mouth daily. SUMAtriptan succinate 6 mg/0.5 mL kit  
  
 verapamil  mg ER capsule Commonly known as:  Dicie Palacois Take 1 Cap by mouth daily. WELLBUTRIN 100 mg tablet Generic drug:  buPROPion Take 100 mg by mouth two (2) times a day. Patient Instructions High Blood Pressure: Care Instructions Your Care Instructions If your blood pressure is usually above 140/90, you have high blood pressure, or hypertension. That means the top number is 140 or higher or the bottom number is 90 or higher, or both. Despite what a lot of people think, high blood pressure usually doesn't cause headaches or make you feel dizzy or lightheaded. It usually has no symptoms. But it does increase your risk for heart attack, stroke, and kidney or eye damage. The higher your blood pressure, the more your risk increases. Your doctor will give you a goal for your blood pressure. Your goal will be based on your health and your age. An example of a goal is to keep your blood pressure below 140/90. Lifestyle changes, such as eating healthy and being active, are always important to help lower blood pressure. You might also take medicine to reach your blood pressure goal. 
Follow-up care is a key part of your treatment and safety. Be sure to make and go to all appointments, and call your doctor if you are having problems. It's also a good idea to know your test results and keep a list of the medicines you take. How can you care for yourself at home? Medical treatment · If you stop taking your medicine, your blood pressure will go back up. You may take one or more types of medicine to lower your blood pressure. Be safe with medicines. Take your medicine exactly as prescribed. Call your doctor if you think you are having a problem with your medicine. · Talk to your doctor before you start taking aspirin every day. Aspirin can help certain people lower their risk of a heart attack or stroke. But taking aspirin isn't right for everyone, because it can cause serious bleeding. · See your doctor regularly. You may need to see the doctor more often at first or until your blood pressure comes down. · If you are taking blood pressure medicine, talk to your doctor before you take decongestants or anti-inflammatory medicine, such as ibuprofen. Some of these medicines can raise blood pressure. · Learn how to check your blood pressure at home. Lifestyle changes · Stay at a healthy weight. This is especially important if you put on weight around the waist. Losing even 10 pounds can help you lower your blood pressure. · If your doctor recommends it, get more exercise. Walking is a good choice. Bit by bit, increase the amount you walk every day. Try for at least 30 minutes on most days of the week. You also may want to swim, bike, or do other activities. · Avoid or limit alcohol. Talk to your doctor about whether you can drink any alcohol. · Try to limit how much sodium you eat to less than 2,300 milligrams (mg) a day. Your doctor may ask you to try to eat less than 1,500 mg a day. · Eat plenty of fruits (such as bananas and oranges), vegetables, legumes, whole grains, and low-fat dairy products. · Lower the amount of saturated fat in your diet. Saturated fat is found in animal products such as milk, cheese, and meat. Limiting these foods may help you lose weight and also lower your risk for heart disease. · Do not smoke. Smoking increases your risk for heart attack and stroke. If you need help quitting, talk to your doctor about stop-smoking programs and medicines. These can increase your chances of quitting for good. When should you call for help? Call 911 anytime you think you may need emergency care. This may mean having symptoms that suggest that your blood pressure is causing a serious heart or blood vessel problem. Your blood pressure may be over 180/110. ? For example, call 911 if: 
? · You have symptoms of a heart attack. These may include: ¨ Chest pain or pressure, or a strange feeling in the chest. 
¨ Sweating. ¨ Shortness of breath. ¨ Nausea or vomiting. ¨ Pain, pressure, or a strange feeling in the back, neck, jaw, or upper belly or in one or both shoulders or arms. ¨ Lightheadedness or sudden weakness. ¨ A fast or irregular heartbeat. ? · You have symptoms of a stroke. These may include: 
¨ Sudden numbness, tingling, weakness, or loss of movement in your face, arm, or leg, especially on only one side of your body. ¨ Sudden vision changes. ¨ Sudden trouble speaking. ¨ Sudden confusion or trouble understanding simple statements. ¨ Sudden problems with walking or balance. ¨ A sudden, severe headache that is different from past headaches. ? · You have severe back or belly pain. ?Do not wait until your blood pressure comes down on its own. Get help right away. ?Call your doctor now or seek immediate care if: 
? · Your blood pressure is much higher than normal (such as 180/110 or higher), but you don't have symptoms. ? · You think high blood pressure is causing symptoms, such as: ¨ Severe headache. ¨ Blurry vision. ? Watch closely for changes in your health, and be sure to contact your doctor if: 
? · Your blood pressure measures 140/90 or higher at least 2 times. That means the top number is 140 or higher or the bottom number is 90 or higher, or both. ? · You think you may be having side effects from your blood pressure medicine. ? · Your blood pressure is usually normal, but it goes above normal at least 2 times. Where can you learn more? Go to http://gilda-michael.info/. Enter L434 in the search box to learn more about \"High Blood Pressure: Care Instructions. \" Current as of: September 21, 2016 Content Version: 11.4 © 6040-2023 [employer]. Care instructions adapted under license by [employer] (which disclaims liability or warranty for this information).  If you have questions about a medical condition or this instruction, always ask your healthcare professional. [employer] disclaims any warranty or liability for your use of this information. Introducing [facility]! Dear Olinda Delarosa: Thank you for requesting a [employer] account. Our records indicate that you already have an active [employer] account. You can access your account anytime at [email or web address]. [email or web address] Did you know that you can access your hospital and ER discharge instructions at any time in [employer]? You can also review all of your test results from your hospital stay or ER visit. Additional Information If you have questions, please visit the Frequently Asked Questions section of the [employer] website at [email or web address] Remember, [employer] is NOT to be used for urgent needs. For medical emergencies, dial 911. Now available from your iPhone and Android! Please provide this summary of care documentation to your next provider. Your primary care clinician is listed as [address]. If you have any questions after today's visit, please call [phone].

## 2018-03-26 NOTE — PROGRESS NOTES
Kentrell Domínguez is a 67 y.o. female (: 1945) presenting to address:    Chief Complaint   Patient presents with    Hypertension    Dizziness      is better        Vitals:    18 1510 18 1537   BP: (!) 140/102 (!) 140/100   Pulse: 72    Resp: 16    Temp: 97.9 °F (36.6 °C)    TempSrc: Oral    SpO2: 99%    Weight: 156 lb (70.8 kg)    Height: 5' 7\" (1.702 m)    PainSc:   4    PainLoc: Knee        Hearing/Vision:   No exam data present    Learning Assessment:     Learning Assessment 2/3/2015   PRIMARY LEARNER Patient   HIGHEST LEVEL OF EDUCATION - PRIMARY LEARNER  4 YEARS OF COLLEGE   BARRIERS PRIMARY LEARNER NONE   CO-LEARNER CAREGIVER No   PRIMARY LANGUAGE ENGLISH   LEARNER PREFERENCE PRIMARY LISTENING   ANSWERED BY self   RELATIONSHIP SELF     Depression Screening:     PHQ over the last two weeks 2017   Little interest or pleasure in doing things Not at all   Feeling down, depressed or hopeless Not at all   Total Score PHQ 2 0     Fall Risk Assessment:     Fall Risk Assessment, last 12 mths 2017   Able to walk? Yes   Fall in past 12 months? No     Abuse Screening:     Abuse Screening Questionnaire 2017   Do you ever feel afraid of your partner? N   Are you in a relationship with someone who physically or mentally threatens you? N   Is it safe for you to go home? Y     Coordination of Care Questionaire:   1. Have you been to the ER, urgent care clinic since your last visit? Hospitalized since your last visit? NO    2. Have you seen or consulted any other health care providers outside of the [address] [place] [address] since your last visit? Include any pap smears or colon screening. NO    Advanced Directive:   1. Do you have an Advanced Directive? YES    2. Would you like information on Advanced Directives?  NO

## 2018-03-26 NOTE — PATIENT INSTRUCTIONS
High Blood Pressure: Care Instructions  Your Care Instructions    If your blood pressure is usually above 140/90, you have high blood pressure, or hypertension. That means the top number is 140 or higher or the bottom number is 90 or higher, or both. Despite what a lot of people think, high blood pressure usually doesn't cause headaches or make you feel dizzy or lightheaded. It usually has no symptoms. But it does increase your risk for heart attack, stroke, and kidney or eye damage. The higher your blood pressure, the more your risk increases. Your doctor will give you a goal for your blood pressure. Your goal will be based on your health and your age. An example of a goal is to keep your blood pressure below 140/90. Lifestyle changes, such as eating healthy and being active, are always important to help lower blood pressure. You might also take medicine to reach your blood pressure goal.  Follow-up care is a key part of your treatment and safety. Be sure to make and go to all appointments, and call your doctor if you are having problems. It's also a good idea to know your test results and keep a list of the medicines you take. How can you care for yourself at home? Medical treatment  · If you stop taking your medicine, your blood pressure will go back up. You may take one or more types of medicine to lower your blood pressure. Be safe with medicines. Take your medicine exactly as prescribed. Call your doctor if you think you are having a problem with your medicine. · Talk to your doctor before you start taking aspirin every day. Aspirin can help certain people lower their risk of a heart attack or stroke. But taking aspirin isn't right for everyone, because it can cause serious bleeding. · See your doctor regularly. You may need to see the doctor more often at first or until your blood pressure comes down.   · If you are taking blood pressure medicine, talk to your doctor before you take decongestants or anti-inflammatory medicine, such as ibuprofen. Some of these medicines can raise blood pressure. · Learn how to check your blood pressure at home. Lifestyle changes  · Stay at a healthy weight. This is especially important if you put on weight around the waist. Losing even 10 pounds can help you lower your blood pressure. · If your doctor recommends it, get more exercise. Walking is a good choice. Bit by bit, increase the amount you walk every day. Try for at least 30 minutes on most days of the week. You also may want to swim, bike, or do other activities. · Avoid or limit alcohol. Talk to your doctor about whether you can drink any alcohol. · Try to limit how much sodium you eat to less than 2,300 milligrams (mg) a day. Your doctor may ask you to try to eat less than 1,500 mg a day. · Eat plenty of fruits (such as bananas and oranges), vegetables, legumes, whole grains, and low-fat dairy products. · Lower the amount of saturated fat in your diet. Saturated fat is found in animal products such as milk, cheese, and meat. Limiting these foods may help you lose weight and also lower your risk for heart disease. · Do not smoke. Smoking increases your risk for heart attack and stroke. If you need help quitting, talk to your doctor about stop-smoking programs and medicines. These can increase your chances of quitting for good. When should you call for help? Call 911 anytime you think you may need emergency care. This may mean having symptoms that suggest that your blood pressure is causing a serious heart or blood vessel problem. Your blood pressure may be over 180/110. ? For example, call 911 if:  ? · You have symptoms of a heart attack. These may include:  ¨ Chest pain or pressure, or a strange feeling in the chest.  ¨ Sweating. ¨ Shortness of breath. ¨ Nausea or vomiting.   ¨ Pain, pressure, or a strange feeling in the back, neck, jaw, or upper belly or in one or both shoulders or arms.  ¨ Lightheadedness or sudden weakness. ¨ A fast or irregular heartbeat. ? · You have symptoms of a stroke. These may include:  ¨ Sudden numbness, tingling, weakness, or loss of movement in your face, arm, or leg, especially on only one side of your body. ¨ Sudden vision changes. ¨ Sudden trouble speaking. ¨ Sudden confusion or trouble understanding simple statements. ¨ Sudden problems with walking or balance. ¨ A sudden, severe headache that is different from past headaches. ? · You have severe back or belly pain. ?Do not wait until your blood pressure comes down on its own. Get help right away. ?Call your doctor now or seek immediate care if:  ? · Your blood pressure is much higher than normal (such as 180/110 or higher), but you don't have symptoms. ? · You think high blood pressure is causing symptoms, such as:  ¨ Severe headache. ¨ Blurry vision. ? Watch closely for changes in your health, and be sure to contact your doctor if:  ? · Your blood pressure measures 140/90 or higher at least 2 times. That means the top number is 140 or higher or the bottom number is 90 or higher, or both. ? · You think you may be having side effects from your blood pressure medicine. ? · Your blood pressure is usually normal, but it goes above normal at least 2 times. Where can you learn more? Go to http://gilda-michael.info/. Enter I358 in the search box to learn more about \"High Blood Pressure: Care Instructions. \"  Current as of: September 21, 2016  Content Version: 11.4  © 5228-5701 [employer]. Care instructions adapted under license by [employer] (which disclaims liability or warranty for this information). If you have questions about a medical condition or this instruction, always ask your healthcare professional. [address] [address] any warranty or liability for your use of this information.

## 2018-03-27 NOTE — PROGRESS NOTES
Assessment/Plan:    *Diagnoses and all orders for this visit:    1. Essential hypertension        Will start Toprol 25 mg. Has this at home. F/u 3-4 weeks for BP. The plan was discussed with the patient. The patient verbalized understanding and is in agreement with the plan. All medication potential side effects were discussed with the patient.    -------------------------------------------------------------------------------------------------------------------        Silvia Deras is a 67 y.o. female and presents with Hypertension and Dizziness ( is better )         Subjective:  HTN: not well controlled. We stopped chlorthalidone 2/2 her getting dizziness. BP has spiked. ROS:  Constitutional: No recent weight change. No weakness/fatigue. No f/c. Skin: No rashes, change in nails/hair, itching   HENT: No HA, dizziness. No hearing loss/tinnitus. No nasal congestion/discharge. Eyes: No change in vision, double/blurred vision or eye pain/redness. Cardiovascular: No CP/palpitations. No BURCH/orthopnea/PND. Respiratory: No cough/sputum, dyspnea, wheezing. Gastointestinal: No dysphagia, reflux. No n/v. No constipation/diarrhea. No melena/rectal bleeding. Genitourinary: No dysuria, urinary hesitancy, nocturia, hematuria. No incontinence. Musculoskeletal: No joint pain/stiffness. No muscle pain/tenderness. Endo: No heat/cold intolerance, no polyuria/polydypsia. Heme: No h/o anemia. No easy bleeding/bruising. Allergy/Immunology: No seasonal rhinitis. Denies frequent colds, sinus/ear infections. Neurological: No seizures/numbness/weakness. No paresthesias. Psychiatric:  No depression, anxiety. The problem list was updated as a part of today's visit.   Patient Active Problem List   Diagnosis Code    Hyperlipidemia E78.5    Connective tissue disease ([address] M35.9    Depression F32.9    HH (hiatus hernia) K44.9    Gastroesophageal reflux disease K21.9    Rosacea L71.9    Capsulitis M77.9    Degeneration of intervertebral disc of cervical region M50.30    Allergy T78.40XA    Colitis, ischemic (HCC) K55.9    Anxiety associated with depression F41.8    Raynaud's syndrome I73.00    Hypothyroidism E03.9    Pulmonary nodules R91.8    Renal cyst N28.1    Hypovitaminosis D E55.9    Biliary dyskinesia K82.8    Osteoarthritis M19.90    Dyslipidemia E78.5    Classical migraine with intractable migraine G43.119    Rotator cuff tear arthropathy M12.819    Lower gastrointestinal hemorrhage K92.2    Cervico-occipital neuralgia M54.81    History of cervical spinal surgery Z98.890    History of knee surgery Z98.890    Diarrhea R19.7    Osteopenia M85.80    Generalized arthritis M19.90    Essential hypertension I10       The PSH, FH were reviewed. SH:  Social History   Substance Use Topics    Smoking status: Never Smoker    Smokeless tobacco: Never Used    Alcohol use No       Medications/Allergies:  Current Outpatient Prescriptions on File Prior to Visit   Medication Sig Dispense Refill    divalproex ER (DEPAKOTE ER) 500 mg ER tablet   1    SUMAtriptan succinate 6 mg/0.5 mL kit   0    meclizine (ANTIVERT) 25 mg tablet 25 mg 3 times daily x 4 days, 25 mg twice daily x 4 days. , 25 daily x 4 days. Indications: Vertigo 24 Tab 0    DULoxetine (CYMBALTA) 30 mg capsule Take 30 mg by mouth two (2) times a day.  desonide (TRIDESILON) 0.05 % topical lotion Apply  to affected area two (2) times a day.  buPROPion (WELLBUTRIN) 100 mg tablet Take 100 mg by mouth two (2) times a day.  Dexlansoprazole (DEXILANT) 60 mg CpDB Take 1 Cap by mouth daily. 90 Cap 2    levothyroxine (SYNTHROID) 50 mcg tablet Take 1 Tab by mouth Daily (before breakfast). 90 Tab 2    verapamil ER (VERELAN) 360 mg ER capsule Take 1 Cap by mouth daily.  90 Cap 2    atorvastatin (LIPITOR) 20 mg tablet TAKE 1 TABLET (20 MG) BY MOUTH ONCE DAILY 90 Tab 1    loratadine (CLARITIN) 10 mg tablet Take 10 mg by mouth.  montelukast (SINGULAIR) 10 mg tablet Take 10 mg by mouth daily.  calcium-cholecalciferol, d3, (CALCIUM 600 + D) 600-125 mg-unit tab Take  by mouth two (2) times a day.  coenzyme q10 (CHEW Q) 100 mg chew Take 200 mg by mouth daily.  multivitamin (ONE A DAY) tablet Take 1 Tab by mouth daily.  magnesium Tab Take  by mouth two (2) times a day.  metoprolol succinate (TOPROL-XL) 25 mg XL tablet Take 1 Tab by mouth daily. Indications: hypertension 90 Tab 0     No current facility-administered medications on file prior to visit. Allergies   Allergen Reactions    Pcn [Penicillins] Rash     Was tested is no longer allergic         Health Maintenance:   Health Maintenance   Topic Date Due    GLAUCOMA SCREENING Q2Y  07/07/2012    MEDICARE YEARLY EXAM  06/08/2018    BREAST CANCER SCRN MAMMOGRAM  08/03/2019    COLONOSCOPY  07/07/2022    DTaP/Tdap/Td series (3 - Td) 04/22/2026    Hepatitis C Screening  Completed    Bone Densitometry (Dexa) Screening  Completed    ZOSTER VACCINE AGE 60>  Completed    Pneumococcal 65+ Low/Medium Risk  Completed    Influenza Age 5 to Adult  Completed       Objective:  Visit Vitals    BP (!) 140/100    Pulse 72    Temp 97.9 °F (36.6 °C) (Oral)    Resp 16    Ht 5' 7\" (1.702 m)    Wt 156 lb (70.8 kg)    SpO2 99%    BMI 24.43 kg/m2          Nurses notes and VS reviewed.       Physical Examination: General appearance - alert, well appearing, and in no distress  Chest - clear to auscultation, no wheezes, rales or rhonchi, symmetric air entry  Heart - normal rate, regular rhythm, normal S1, S2, no murmurs, rubs, clicks or gallops        Labwork and Ancillary Studies:    CBC w/Diff  Lab Results   Component Value Date/Time    WBC 6.9 06/09/2017 11:40 AM    HGB 16.1 (H) 06/09/2017 11:40 AM    PLATELET 348 36/83/3930 11:40 AM         Basic Metabolic Profile  Lab Results   Component Value Date/Time    Sodium 140 06/09/2017 11:40 AM Potassium 3.8 06/09/2017 11:40 AM    Chloride 107 06/09/2017 11:40 AM    CO2 24 06/09/2017 11:40 AM    Anion gap 9 06/09/2017 11:40 AM    Glucose 107 (H) 06/09/2017 11:40 AM    BUN 16 06/09/2017 11:40 AM    Creatinine 0.96 06/09/2017 11:40 AM    BUN/Creatinine ratio 17 06/09/2017 11:40 AM    GFR est AA >60 06/09/2017 11:40 AM    GFR est non-AA 57 (L) 06/09/2017 11:40 AM    Calcium 9.6 06/09/2017 11:40 AM         LFT  Lab Results   Component Value Date/Time    ALT (SGPT) 17 06/09/2017 11:40 AM    AST (SGOT) 16 06/09/2017 11:40 AM    Alk.  phosphatase 70 06/09/2017 11:40 AM    Bilirubin, direct 0.1 06/09/2017 11:40 AM    Bilirubin, total 0.4 06/09/2017 11:40 AM         Cholesterol  Lab Results   Component Value Date/Time    Cholesterol, total 167 06/09/2017 11:40 AM    HDL Cholesterol 69 (H) 06/09/2017 11:40 AM    LDL, calculated 83 06/09/2017 11:40 AM    Triglyceride 75 06/09/2017 11:40 AM    CHOL/HDL Ratio 2.4 06/09/2017 11:40 AM

## 2018-04-25 ENCOUNTER — OFFICE VISIT (OUTPATIENT)
Dept: FAMILY MEDICINE CLINIC | Age: 73
End: 2018-04-25

## 2018-04-25 DIAGNOSIS — I10 ESSENTIAL HYPERTENSION: Primary | ICD-10-CM

## 2018-04-25 DIAGNOSIS — E55.9 HYPOVITAMINOSIS D: ICD-10-CM

## 2018-04-25 DIAGNOSIS — E78.00 PURE HYPERCHOLESTEROLEMIA: ICD-10-CM

## 2018-04-25 NOTE — PROGRESS NOTES
Assessment/Plan:    *Diagnoses and all orders for this visit:    1. Essential hypertension    2. Pure hypercholesterolemia  -     CBC WITH AUTOMATED DIFF; Future  -     HEPATIC FUNCTION PANEL; Future  -     LIPID PANEL; Future  -     METABOLIC PANEL, BASIC; Future  -     TSH 3RD GENERATION; Future  -     T4, FREE; Future  -     URINALYSIS W/ RFLX MICROSCOPIC; Future  -     VITAMIN D, 25 HYDROXY; Future    3. Hypovitaminosis D  -     VITAMIN D, 25 HYDROXY; Future        Pt will reduce Toprol a bit, will cut the pill in half and continue to monitor her BP at home. Has a physical in June so will f/u on that later. The plan was discussed with the patient. The patient verbalized understanding and is in agreement with the plan. All medication potential side effects were discussed with the patient.    -------------------------------------------------------------------------------------------------------------------        Dorie Quinteros is a 67 y.o. female and presents with No chief complaint on file. Subjective:  Pt here to f/u on her HTN. Last visit, we added Toprol in order to get better control but it appears that the full dose is too strong and is lower her BP more than we expected. She does note some mild dizziness. ROS:  Constitutional: No recent weight change. No weakness/fatigue. No f/c. Skin: No rashes, change in nails/hair, itching   HENT: No HA, dizziness. No hearing loss/tinnitus. No nasal congestion/discharge. Eyes: No change in vision, double/blurred vision or eye pain/redness. Cardiovascular: No CP/palpitations. No BURCH/orthopnea/PND. Respiratory: No cough/sputum, dyspnea, wheezing. Gastointestinal: No dysphagia, reflux. No n/v. No constipation/diarrhea. No melena/rectal bleeding. Genitourinary: No dysuria, urinary hesitancy, nocturia, hematuria. No incontinence. Musculoskeletal: No joint pain/stiffness. No muscle pain/tenderness.    Endo: No heat/cold intolerance, no polyuria/polydypsia. Heme: No h/o anemia. No easy bleeding/bruising. Allergy/Immunology: No seasonal rhinitis. Denies frequent colds, sinus/ear infections. Neurological: No seizures/numbness/weakness. No paresthesias. Psychiatric:  No depression, anxiety. The problem list was updated as a part of today's visit. Patient Active Problem List   Diagnosis Code    Hyperlipidemia E78.5    Connective tissue disease ([facility] [address] M35.9    Depression F32.9    HH (hiatus hernia) K44.9    Gastroesophageal reflux disease K21.9    Rosacea L71.9    Capsulitis M77.9    Degeneration of intervertebral disc of cervical region M50.30    Allergy T78.40XA    Colitis, ischemic ([facility] [address] K55.9    Anxiety associated with depression F41.8    Raynaud's syndrome I73.00    Hypothyroidism E03.9    Pulmonary nodules R91.8    Renal cyst N28.1    Hypovitaminosis D E55.9    Biliary dyskinesia K82.8    Osteoarthritis M19.90    Dyslipidemia E78.5    Classical migraine with intractable migraine G43.119    Rotator cuff tear arthropathy M12.819    Lower gastrointestinal hemorrhage K92.2    Cervico-occipital neuralgia M54.81    History of cervical spinal surgery Z98.890    History of knee surgery Z98.890    Diarrhea R19.7    Osteopenia M85.80    Generalized arthritis M19.90    Essential hypertension I10       The PSH, FH were reviewed. SH:  Social History   Substance Use Topics    Smoking status: Never Smoker    Smokeless tobacco: Never Used    Alcohol use No       Medications/Allergies:  Current Outpatient Prescriptions on File Prior to Visit   Medication Sig Dispense Refill    naratriptan (AMERGE) 2.5 mg tab Take 2.5 mg by mouth once as needed.  divalproex ER (DEPAKOTE ER) 500 mg ER tablet   1    SUMAtriptan succinate 6 mg/0.5 mL kit   0    meclizine (ANTIVERT) 25 mg tablet 25 mg 3 times daily x 4 days, 25 mg twice daily x 4 days. , 25 daily x 4 days.   Indications: Vertigo 24 Tab 0    metoprolol succinate (TOPROL-XL) 25 mg XL tablet Take 1 Tab by mouth daily. Indications: hypertension 90 Tab 0    DULoxetine (CYMBALTA) 30 mg capsule Take 30 mg by mouth two (2) times a day.  desonide (TRIDESILON) 0.05 % topical lotion Apply  to affected area two (2) times a day.  buPROPion (WELLBUTRIN) 100 mg tablet Take 100 mg by mouth two (2) times a day.  Dexlansoprazole (DEXILANT) 60 mg CpDB Take 1 Cap by mouth daily. 90 Cap 2    levothyroxine (SYNTHROID) 50 mcg tablet Take 1 Tab by mouth Daily (before breakfast). 90 Tab 2    verapamil ER (VERELAN) 360 mg ER capsule Take 1 Cap by mouth daily. 90 Cap 2    atorvastatin (LIPITOR) 20 mg tablet TAKE 1 TABLET (20 MG) BY MOUTH ONCE DAILY 90 Tab 1    loratadine (CLARITIN) 10 mg tablet Take 10 mg by mouth.  montelukast (SINGULAIR) 10 mg tablet Take 10 mg by mouth daily.  calcium-cholecalciferol, d3, (CALCIUM 600 + D) 600-125 mg-unit tab Take  by mouth two (2) times a day.  coenzyme q10 (CHEW Q) 100 mg chew Take 200 mg by mouth daily.  multivitamin (ONE A DAY) tablet Take 1 Tab by mouth daily.  magnesium Tab Take  by mouth two (2) times a day. No current facility-administered medications on file prior to visit. Allergies   Allergen Reactions    Pcn [Penicillins] Rash     Was tested is no longer allergic         Health Maintenance:   Health Maintenance   Topic Date Due    GLAUCOMA SCREENING Q2Y  07/07/2012    MEDICARE YEARLY EXAM  06/08/2018    BREAST CANCER SCRN MAMMOGRAM  08/03/2019    COLONOSCOPY  07/07/2022    DTaP/Tdap/Td series (3 - Td) 04/22/2026    Hepatitis C Screening  Completed    Bone Densitometry (Dexa) Screening  Completed    ZOSTER VACCINE AGE 60>  Completed    Pneumococcal 65+ Low/Medium Risk  Completed    Influenza Age 5 to Adult  Completed       Objective: There were no vitals taken for this visit. Nurses notes and VS reviewed.       Physical Examination: General appearance - alert, well appearing, and in no distress  Chest - clear to auscultation, no wheezes, rales or rhonchi, symmetric air entry  Heart - normal rate, regular rhythm, normal S1, S2, no murmurs, rubs, clicks or gallops        Labwork and Ancillary Studies:    CBC w/Diff  Lab Results   Component Value Date/Time    WBC 6.9 06/09/2017 11:40 AM    HGB 16.1 (H) 06/09/2017 11:40 AM    PLATELET 188 56/49/0096 11:40 AM         Basic Metabolic Profile  Lab Results   Component Value Date/Time    Sodium 140 06/09/2017 11:40 AM    Potassium 3.8 06/09/2017 11:40 AM    Chloride 107 06/09/2017 11:40 AM    CO2 24 06/09/2017 11:40 AM    Anion gap 9 06/09/2017 11:40 AM    Glucose 107 (H) 06/09/2017 11:40 AM    BUN 16 06/09/2017 11:40 AM    Creatinine 0.96 06/09/2017 11:40 AM    BUN/Creatinine ratio 17 06/09/2017 11:40 AM    GFR est AA >60 06/09/2017 11:40 AM    GFR est non-AA 57 (L) 06/09/2017 11:40 AM    Calcium 9.6 06/09/2017 11:40 AM         LFT  Lab Results   Component Value Date/Time    ALT (SGPT) 17 06/09/2017 11:40 AM    AST (SGOT) 16 06/09/2017 11:40 AM    Alk.  phosphatase 70 06/09/2017 11:40 AM    Bilirubin, direct 0.1 06/09/2017 11:40 AM    Bilirubin, total 0.4 06/09/2017 11:40 AM         Cholesterol  Lab Results   Component Value Date/Time    Cholesterol, total 167 06/09/2017 11:40 AM    HDL Cholesterol 69 (H) 06/09/2017 11:40 AM    LDL, calculated 83 06/09/2017 11:40 AM    Triglyceride 75 06/09/2017 11:40 AM    CHOL/HDL Ratio 2.4 06/09/2017 11:40 AM

## 2018-04-27 VITALS
HEIGHT: 67 IN | HEART RATE: 79 BPM | SYSTOLIC BLOOD PRESSURE: 90 MMHG | WEIGHT: 155 LBS | RESPIRATION RATE: 20 BRPM | TEMPERATURE: 98.4 F | OXYGEN SATURATION: 99 % | DIASTOLIC BLOOD PRESSURE: 70 MMHG | BODY MASS INDEX: 24.33 KG/M2

## 2018-04-27 NOTE — PROGRESS NOTES
Judi Ambrosio is a 67 y.o. female (: 1945) presenting to address:    Chief Complaint   Patient presents with    Hypertension    Cholesterol Problem       Vitals:    18 0759   BP: 90/70   Pulse: 79   Resp: 20   Temp: 98.4 °F (36.9 °C)   TempSrc: Oral   SpO2: 99%   Weight: 155 lb (70.3 kg)   Height: 5' 7\" (1.702 m)   PainSc:   0 - No pain       Learning Assessment:     Learning Assessment 2/3/2015   PRIMARY LEARNER Patient   HIGHEST LEVEL OF EDUCATION - PRIMARY LEARNER  4 YEARS OF COLLEGE   BARRIERS PRIMARY LEARNER NONE   CO-LEARNER CAREGIVER No   PRIMARY LANGUAGE ENGLISH   LEARNER PREFERENCE PRIMARY LISTENING   ANSWERED BY self   RELATIONSHIP SELF     Depression Screening:     PHQ over the last two weeks 2017   Little interest or pleasure in doing things Not at all   Feeling down, depressed or hopeless Not at all   Total Score PHQ 2 0     Fall Risk Assessment:     Fall Risk Assessment, last 12 mths 2017   Able to walk? Yes   Fall in past 12 months? No     Abuse Screening:     Abuse Screening Questionnaire 2017   Do you ever feel afraid of your partner? N   Are you in a relationship with someone who physically or mentally threatens you? N   Is it safe for you to go home? Y     Coordination of Care Questionaire:   1. Have you been to the ER, urgent care clinic since your last visit? Hospitalized since your last visit? NO    2. Have you seen or consulted any other health care providers outside of the [address] [place] [address] since your last visit? Include any pap smears or colon screening. NO    Advanced Directive:   1. Do you have an Advanced Directive? YES    2. Would you like information on Advanced Directives?  NO

## 2018-05-04 RX ORDER — ATORVASTATIN CALCIUM 20 MG/1
TABLET, FILM COATED ORAL
Qty: 90 TAB | Refills: 1 | Status: SHIPPED | OUTPATIENT
Start: 2018-05-04 | End: 2018-06-13 | Stop reason: SDUPTHER

## 2018-06-13 ENCOUNTER — OFFICE VISIT (OUTPATIENT)
Dept: FAMILY MEDICINE CLINIC | Age: 73
End: 2018-06-13

## 2018-06-13 VITALS
SYSTOLIC BLOOD PRESSURE: 128 MMHG | RESPIRATION RATE: 18 BRPM | HEART RATE: 96 BPM | HEIGHT: 67 IN | DIASTOLIC BLOOD PRESSURE: 82 MMHG | BODY MASS INDEX: 24.55 KG/M2 | TEMPERATURE: 97.7 F | WEIGHT: 156.4 LBS | OXYGEN SATURATION: 99 %

## 2018-06-13 DIAGNOSIS — E03.4 HYPOTHYROIDISM DUE TO ACQUIRED ATROPHY OF THYROID: ICD-10-CM

## 2018-06-13 DIAGNOSIS — R06.02 SOB (SHORTNESS OF BREATH) ON EXERTION: Primary | ICD-10-CM

## 2018-06-13 DIAGNOSIS — K21.9 GASTROESOPHAGEAL REFLUX DISEASE WITHOUT ESOPHAGITIS: ICD-10-CM

## 2018-06-13 DIAGNOSIS — R07.89 CHEST PRESSURE: ICD-10-CM

## 2018-06-13 DIAGNOSIS — Z00.00 MEDICARE ANNUAL WELLNESS VISIT, SUBSEQUENT: ICD-10-CM

## 2018-06-13 DIAGNOSIS — E78.00 PURE HYPERCHOLESTEROLEMIA: ICD-10-CM

## 2018-06-13 DIAGNOSIS — M19.90 GENERALIZED ARTHRITIS: ICD-10-CM

## 2018-06-13 RX ORDER — LEVOTHYROXINE SODIUM 50 UG/1
50 TABLET ORAL
Qty: 90 TAB | Refills: 2 | Status: SHIPPED | OUTPATIENT
Start: 2018-06-13 | End: 2019-03-19

## 2018-06-13 RX ORDER — VERAPAMIL HYDROCHLORIDE 360 MG/1
360 CAPSULE, DELAYED RELEASE PELLETS ORAL DAILY
Qty: 90 CAP | Refills: 2 | Status: SHIPPED | OUTPATIENT
Start: 2018-06-13 | End: 2019-06-19 | Stop reason: SDUPTHER

## 2018-06-13 RX ORDER — METOPROLOL SUCCINATE 25 MG/1
25 TABLET, EXTENDED RELEASE ORAL DAILY
Qty: 90 TAB | Refills: 2 | Status: SHIPPED | OUTPATIENT
Start: 2018-06-13 | End: 2018-07-30 | Stop reason: ALTCHOICE

## 2018-06-13 RX ORDER — ATORVASTATIN CALCIUM 20 MG/1
TABLET, FILM COATED ORAL
Qty: 90 TAB | Refills: 2 | Status: SHIPPED | OUTPATIENT
Start: 2018-06-13 | End: 2019-06-19 | Stop reason: SDUPTHER

## 2018-06-13 NOTE — PATIENT INSTRUCTIONS
Shortness of Breath: Care Instructions  Your Care Instructions  Shortness of breath has many causes. Sometimes conditions such as anxiety can lead to shortness of breath. Some people get mild shortness of breath when they exercise. Trouble breathing also can be a symptom of a serious problem, such as asthma, lung disease, emphysema, heart problems, and pneumonia. If your shortness of breath continues, you may need tests and treatment. Watch for any changes in your breathing and other symptoms. Follow-up care is a key part of your treatment and safety. Be sure to make and go to all appointments, and call your doctor if you are having problems. It's also a good idea to know your test results and keep a list of the medicines you take. How can you care for yourself at home? · Do not smoke or allow others to smoke around you. If you need help quitting, talk to your doctor about stop-smoking programs and medicines. These can increase your chances of quitting for good. · Get plenty of rest and sleep. · Take your medicines exactly as prescribed. Call your doctor if you think you are having a problem with your medicine. · Find healthy ways to deal with stress. ¨ Exercise daily. ¨ Get plenty of sleep. ¨ Eat regularly and well. When should you call for help? Call 911 anytime you think you may need emergency care. For example, call if:  ? · You have severe shortness of breath. ? · You have symptoms of a heart attack. These may include:  ¨ Chest pain or pressure, or a strange feeling in the chest.  ¨ Sweating. ¨ Shortness of breath. ¨ Nausea or vomiting. ¨ Pain, pressure, or a strange feeling in the back, neck, jaw, or upper belly or in one or both shoulders or arms. ¨ Lightheadedness or sudden weakness. ¨ A fast or irregular heartbeat. After you call 911, the  may tell you to chew 1 adult-strength or 2 to 4 low-dose aspirin. Wait for an ambulance. Do not try to drive yourself.    ?Call your doctor now or seek immediate medical care if:  ? · Your shortness of breath gets worse or you start to wheeze. Wheezing is a high-pitched sound when you breathe. ? · You wake up at night out of breath or have to prop your head up on several pillows to breathe. ? · You are short of breath after only light activity or while at rest.   ? Watch closely for changes in your health, and be sure to contact your doctor if:  ? · You do not get better over the next 1 to 2 days. Where can you learn more? Go to http://gilda-michael.info/. Enter S780 in the search box to learn more about \"Shortness of Breath: Care Instructions. \"  Current as of: May 12, 2017  Content Version: 11.4  © 8772-5015 [employer]. Care instructions adapted under license by [employer] (which disclaims liability or warranty for this information). If you have questions about a medical condition or this instruction, always ask your healthcare professional. [place] [address] any warranty or liability for your use of this information. You have been referred to dermatology. Please call one of the preferred providers listed below and schedule your appointment. Once you have scheduled your appointment, please call the office at [phone] leave the details of your appointment (provider you will be seeing, appointment date and time) on the voice mail. [facility] Dermatology   Dr. Pearce Handing    [address] [facility] [address] [place] [address] , [address] Medicare Wellness Visit, Female    The best way to live healthy is to have a lifestyle where you eat a well-balanced diet, exercise regularly, limit alcohol use, and quit all forms of tobacco/nicotine, if applicable. Regular preventive services are another way to keep healthy.  Preventive services (vaccines, screening tests, monitoring & exams) can help personalize your care plan, which helps you manage your own care. Screening tests can find health problems at the earliest stages, when they are easiest to treat. [facility] follows the current, evidence-based guidelines published by the [facility] ([facility]) when recommending preventive services for our patients. Because we follow these guidelines, sometimes recommendations change over time as research supports it. (For example, mammograms used to be recommended annually. Even though Medicare will still pay for an annual mammogram, the newer guidelines recommend a mammogram every two years for women of average risk.)    Of course, you and your provider may decide to screen more often for some diseases, based on your risk and co-morbidities (chronic disease you are already diagnosed with). Preventive services for you include:    - Medicare offers their members a free annual wellness visit, which is time for you and your primary care provider to discuss and plan for your preventive service needs. Take advantage of this benefit every year!    -All people over age 72 should receive the recommended pneumonia vaccines. Current USPSTF guidelines recommend a series of two vaccines for the best pneumonia protection.     -All adults should have a yearly flu vaccine and a tetanus vaccine every 10 years. All adults age 61 years should receive a shingles vaccine once in their lifetime.      -A bone mass density test is recommended when a woman turns 65 to screen for osteoporosis. This test is only recommended once as a screening. Some providers will use this same test as a disease monitoring tool if you already have osteoporosis.     -All adults age 38-68 years who are overweight should have a diabetes screening test once every three years.     -Other screening tests & preventive services for persons with diabetes include: an eye exam to screen for diabetic retinopathy, a kidney function test, a foot exam, and stricter control over your cholesterol.     -Cardiovascular screening for adults with routine risk involves an electrocardiogram (ECG) at intervals determined by the provider.     -Colorectal cancer screenings should be done for adults age 54-65 years with normal risk. There are a number of acceptable methods of screening for this type of cancer. Each test has its own benefits and drawbacks. Discuss with your provider what is most appropriate for you during your annual wellness visit. The different tests include: colonoscopy (considered the best screening method), a fecal occult blood test, a fecal DNA test, and sigmoidoscopy. -Breast cancer screenings are recommended every other year for women of normal risk age 54-69 years.     -Cervical cancer screenings for women over age 72 are only recommended with certain risk factors.     -All adults born between [facility] should be screened once for Hepatitis C.      Here is a list of your current Health Maintenance items (your personalized list of preventive services) with a due date:  Health Maintenance Due   Topic Date Due    Glaucoma Screening   07/07/2012    Annual Well Visit  06/08/2018

## 2018-06-13 NOTE — PROGRESS NOTES
This is the Subsequent Medicare Annual Wellness Exam, performed 12 months or more after the Initial AWV or the last Subsequent AWV    I have reviewed the patient's medical history in detail and updated the computerized patient record. History     Past Medical History:   Diagnosis Date    Allergy     Anxiety associated with depression 8/2/2011    Arthritis     Colitis, ischemic (HCC)     Connective tissue disease ([facility] [address]     Degeneration of intervertebral disc of cervical region     Most recent MRI was Jan 2017, done by Dr. Diya Josue    Degenerative disc disease     Depression     anxiety    Generalized arthritis 1/24/2018    GERD (gastroesophageal reflux disease)     Headache(784.0)     migraines    HH (hiatus hernia)     Hyperlipidemia     Hypertension     Hypovitaminosis D 10/19/2015    Osteopenia 6/7/2017    Other forms of migraine, without mention of intractable migraine without mention of status migrainosus     Pulmonary nodules 2/25/2010    Raynaud's syndrome     Renal cyst 2/25/2014    Rosacea       Past Surgical History:   Procedure Laterality Date    CARDIAC SURG PROCEDURE UNLIST      cardiac cath    HX BUNIONECTOMY      HX CARPAL TUNNEL RELEASE      right    HX CERVICAL FUSION  1987    C5-6    HX KNEE ARTHROSCOPY      RIGHT    HX LUMBAR FUSION  1988 &1997    L5-S1 surgical repair    HX MOHS PROCEDURES      HX OTHER SURGICAL Left 1-7-16    Dr Regan Downing VB General foot surgery     HX OTHER SURGICAL  1-2016    foot surgery    TOTAL KNEE ARTHROPLASTY  10/2011    right knee     Current Outpatient Prescriptions   Medication Sig Dispense Refill    atorvastatin (LIPITOR) 20 mg tablet TAKE 1 TABLET (20 MG) BY MOUTH ONCE DAILY 90 Tab 2    levothyroxine (SYNTHROID) 50 mcg tablet Take 1 Tab by mouth Daily (before breakfast). 90 Tab 2    verapamil ER (VERELAN) 360 mg ER capsule Take 1 Cap by mouth daily.  90 Cap 2    metoprolol succinate (TOPROL-XL) 25 mg XL tablet Take 1 Tab by mouth daily. Indications: hypertension 90 Tab 2    naratriptan (AMERGE) 2.5 mg tab Take 2.5 mg by mouth once as needed.  divalproex ER (DEPAKOTE ER) 500 mg ER tablet   1    DULoxetine (CYMBALTA) 30 mg capsule Take 30 mg by mouth two (2) times a day.  desonide (TRIDESILON) 0.05 % topical lotion Apply  to affected area two (2) times a day.  buPROPion (WELLBUTRIN) 100 mg tablet Take 100 mg by mouth two (2) times a day.  Dexlansoprazole (DEXILANT) 60 mg CpDB Take 1 Cap by mouth daily. 90 Cap 2    calcium-cholecalciferol, d3, (CALCIUM 600 + D) 600-125 mg-unit tab Take  by mouth two (2) times a day.  coenzyme q10 (CHEW Q) 100 mg chew Take 200 mg by mouth daily.  multivitamin (ONE A DAY) tablet Take 1 Tab by mouth daily.  magnesium Tab Take  by mouth two (2) times a day.        Allergies   Allergen Reactions    Pcn [Penicillins] Rash     Was tested is no longer allergic     Family History   Problem Relation Age of Onset    Stroke Mother     Stroke Father      Social History   Substance Use Topics    Smoking status: Never Smoker    Smokeless tobacco: Never Used    Alcohol use No     Patient Active Problem List   Diagnosis Code    Hyperlipidemia E78.5    Connective tissue disease ([facility] [address] M35.9    Depression F32.9    HH (hiatus hernia) K44.9    Gastroesophageal reflux disease K21.9    Rosacea L71.9    Capsulitis M77.9    Degeneration of intervertebral disc of cervical region M50.30    Allergy T78.40XA    Colitis, ischemic (HCC) K55.9    Anxiety associated with depression F41.8    Raynaud's syndrome I73.00    Hypothyroidism E03.9    Pulmonary nodules R91.8    Renal cyst N28.1    Hypovitaminosis D E55.9    Biliary dyskinesia K82.8    Osteoarthritis M19.90    Dyslipidemia E78.5    Classical migraine with intractable migraine G43.119    Rotator cuff tear arthropathy M12.819    Lower gastrointestinal hemorrhage K92.2    Cervico-occipital neuralgia M54.81    History of cervical spinal surgery Z98.890    History of knee surgery Z98.890    Diarrhea R19.7    Osteopenia M85.80    Generalized arthritis M19.90    Essential hypertension I10       Depression Risk Factor Screening:     PHQ over the last two weeks 6/7/2017   Little interest or pleasure in doing things Not at all   Feeling down, depressed or hopeless Not at all   Total Score PHQ 2 0     Alcohol Risk Factor Screening: You do not drink alcohol or very rarely. Functional Ability and Level of Safety:   Hearing Loss  Hearing is good. Activities of Daily Living  The home contains: no safety equipment. Patient does total self care    Fall Risk  Fall Risk Assessment, last 12 mths 6/13/2018   Able to walk? Yes   Fall in past 12 months? Yes   Fall with injury? Yes   Number of falls in past 12 months 1   Fall Risk Score 2       Abuse Screen  Patient is not abused    Cognitive Screening   Evaluation of Cognitive Function:  Has your family/caregiver stated any concerns about your memory: no  Normal    Patient Care Team   Patient Care Team:  Jessee Wray MD as PCP - Artie Santo MD (Ophthalmology)    Assessment/Plan   Education and counseling provided:  Are appropriate based on today's review and evaluation    Diagnoses and all orders for this visit:    1. SOB (shortness of breath) on exertion  -     PULMONARY FUNCTION TEST; Future  -     AMB POC EKG ROUTINE W/ 12 LEADS, INTER & REP  -     NUCLEAR STRESS TEST; Future  -     NM CARDIAC SPECT W STRS / REST MULT; Future  -     2D ECHO COMPLETE ADULT (TTE) W OR WO CONTR; Future    2. Chest pressure  -     AMB POC EKG ROUTINE W/ 12 LEADS, INTER & REP  -     NUCLEAR STRESS TEST; Future  -     NM CARDIAC SPECT W STRS / REST MULT; Future    3. Generalized arthritis    4. Medicare annual wellness visit, subsequent    Other orders  -     atorvastatin (LIPITOR) 20 mg tablet; TAKE 1 TABLET (20 MG) BY MOUTH ONCE DAILY  -     levothyroxine (SYNTHROID) 50 mcg tablet;  Take 1 Tab by mouth Daily (before breakfast). -     verapamil ER (VERELAN) 360 mg ER capsule; Take 1 Cap by mouth daily. -     metoprolol succinate (TOPROL-XL) 25 mg XL tablet; Take 1 Tab by mouth daily.  Indications: hypertension        Health Maintenance Due   Topic Date Due    GLAUCOMA SCREENING Q2Y  07/07/2012    MEDICARE YEARLY EXAM  06/08/2018

## 2018-06-13 NOTE — MR AVS SNAPSHOT
[address] [place] [address] [place]  [address] [facility] [address] [phone] Patient: Dorie Quinteros MRN: [id number] :1945 Visit Information Date & Time Provider Department Dept. Phone Encounter #  
 2018  1:30 PM Lv Frye [phone] [id number] Upcoming Health Maintenance Date Due  
 GLAUCOMA SCREENING Q2Y 2012 MEDICARE YEARLY EXAM 2018 Influenza Age 5 to Adult 2018 BREAST CANCER SCRN MAMMOGRAM 8/3/2019 COLONOSCOPY 2022 DTaP/Tdap/Td series (3 - Td) 2026 Allergies as of 2018  Review Complete On: 2018 By: Angel Bhat LPN Severity Noted Reaction Type Reactions Pcn [Penicillins]  2010    Rash Was tested is no longer allergic Current Immunizations  Reviewed on 2016 Name Date Influenza High Dose Vaccine PF 10/6/2017, 10/27/2016  9:24 AM, 10/19/2015 10:00 AM  
 Influenza Vaccine 2014 Influenza Vaccine Split 10/16/2012, 2010 Influenza Vaccine Whole 10/5/2011 PPD 2010 Pneumococcal Polysaccharide (PPSV-23) 2014, 2014 TDAP Vaccine 2010 Td 2010 12:00 AM  
 Td, Adsorbed PF 2010 Tdap 2016 ZZZ-RETIRED (DO NOT USE) Pneumococcal Vaccine (Unspecified Type) 2007 Zoster 2009 Not reviewed this visit You Were Diagnosed With   
  
 Codes Comments SOB (shortness of breath) on exertion    -  Primary ICD-10-CM: R06.02 
ICD-9-CM: 786.05 Chest pressure     ICD-10-CM: R07.89 ICD-9-CM: 786.59 Generalized arthritis     ICD-10-CM: M19.90 ICD-9-CM: 716.90 Vitals BP Pulse Temp Resp Height(growth percentile) Weight(growth percentile) 128/82 (!) 103 97.7 °F (36.5 °C) (Oral) 18 5' 7\" (1.702 m) 156 lb 6.4 oz (70.9 kg) LMP SpO2 BMI OB Status Smoking Status (LMP Unknown) 99% 24.5 kg/m2 Postmenopausal Never Smoker BMI and BSA Data Body Mass Index Body Surface Area 24.5 kg/m 2 1.83 m 2 Preferred Pharmacy Pharmacy Name Phone [facility] [address] [phone] Your Updated Medication List  
  
   
This list is accurate as of 6/13/18  2:36 PM.  Always use your most recent med list.  
  
  
  
  
 AMERGE 2.5 mg Tab Generic drug:  naratriptan Take 2.5 mg by mouth once as needed. atorvastatin 20 mg tablet Commonly known as:  LIPITOR  
TAKE 1 TABLET (20 MG) BY MOUTH ONCE DAILY CALCIUM 600 + D 600-125 mg-unit Tab Generic drug:  calcium-cholecalciferol (d3) Take  by mouth two (2) times a day. CHEW Q 100 mg Chew Generic drug:  coenzyme q10 Take 200 mg by mouth daily. CYMBALTA 30 mg capsule Generic drug:  DULoxetine Take 30 mg by mouth two (2) times a day. desonide 0.05 % topical lotion Commonly known as:  Sharan Jones Apply  to affected area two (2) times a day. Dexlansoprazole 60 mg Cpdb Commonly known as:  DEXILANT Take 1 Cap by mouth daily. divalproex  mg ER tablet Commonly known as:  DEPAKOTE ER  
  
 levothyroxine 50 mcg tablet Commonly known as:  SYNTHROID Take 1 Tab by mouth Daily (before breakfast). magnesium Tab Take  by mouth two (2) times a day. metoprolol succinate 25 mg XL tablet Commonly known as:  TOPROL-XL Take 1 Tab by mouth daily. Indications: hypertension  
  
 multivitamin tablet Commonly known as:  ONE A DAY Take 1 Tab by mouth daily. verapamil  mg ER capsule Commonly known as:  Adelaide Ohs Take 1 Cap by mouth daily. WELLBUTRIN 100 mg tablet Generic drug:  buPROPion Take 100 mg by mouth two (2) times a day. Prescriptions Sent to Pharmacy Refills  
 atorvastatin (LIPITOR) 20 mg tablet 2 Sig: TAKE 1 TABLET (20 MG) BY MOUTH ONCE DAILY  Class: Normal  
 Pharmacy: [facility] [address] Ph #: [phone]  
 levothyroxine (SYNTHROID) 50 mcg tablet 2 Sig: Take 1 Tab by mouth Daily (before breakfast). Class: Normal  
 Pharmacy: [facility] [address] [place] [address], [address] [place] [address] Ph #: [phone] Route: Oral  
 verapamil ER (VERELAN) 360 mg ER capsule 2 Sig: Take 1 Cap by mouth daily. Class: Normal  
 Pharmacy: [facility] [address] [place] [address], [address] [place] [address] Ph #: [phone] Route: Oral  
 metoprolol succinate (TOPROL-XL) 25 mg XL tablet 2 Sig: Take 1 Tab by mouth daily. Indications: hypertension Class: Normal  
 Pharmacy: [facility] [address], [address] [place] [address] Ph #: [phone] Route: Oral  
  
We Performed the Following AMB POC EKG ROUTINE W/ 12 LEADS, INTER & REP [79039 CPT(R)] To-Do List   
 06/13/2018 ECHO:  2D ECHO COMPLETE ADULT (TTE) W OR WO CONTR   
  
 06/13/2018 Imaging:  NM CARDIAC SPECT W STRS/REST MULT   
  
 06/13/2018 ECG:  NUCLEAR STRESS TEST   
  
 06/13/2018 PFT:  PULMONARY FUNCTION TEST Patient Instructions Shortness of Breath: Care Instructions Your Care Instructions Shortness of breath has many causes. Sometimes conditions such as anxiety can lead to shortness of breath. Some people get mild shortness of breath when they exercise. Trouble breathing also can be a symptom of a serious problem, such as asthma, lung disease, emphysema, heart problems, and pneumonia. If your shortness of breath continues, you may need tests and treatment. Watch for any changes in your breathing and other symptoms. Follow-up care is a key part of your treatment and safety.  Be sure to make and go to all appointments, and call your doctor if you are having problems. It's also a good idea to know your test results and keep a list of the medicines you take. How can you care for yourself at home? · Do not smoke or allow others to smoke around you. If you need help quitting, talk to your doctor about stop-smoking programs and medicines. These can increase your chances of quitting for good. · Get plenty of rest and sleep. · Take your medicines exactly as prescribed. Call your doctor if you think you are having a problem with your medicine. · Find healthy ways to deal with stress. ¨ Exercise daily. ¨ Get plenty of sleep. ¨ Eat regularly and well. When should you call for help? Call 911 anytime you think you may need emergency care. For example, call if: 
? · You have severe shortness of breath. ? · You have symptoms of a heart attack. These may include: ¨ Chest pain or pressure, or a strange feeling in the chest. 
¨ Sweating. ¨ Shortness of breath. ¨ Nausea or vomiting. ¨ Pain, pressure, or a strange feeling in the back, neck, jaw, or upper belly or in one or both shoulders or arms. ¨ Lightheadedness or sudden weakness. ¨ A fast or irregular heartbeat. After you call 911, the  may tell you to chew 1 adult-strength or 2 to 4 low-dose aspirin. Wait for an ambulance. Do not try to drive yourself. ?Call your doctor now or seek immediate medical care if: 
? · Your shortness of breath gets worse or you start to wheeze. Wheezing is a high-pitched sound when you breathe. ? · You wake up at night out of breath or have to prop your head up on several pillows to breathe. ? · You are short of breath after only light activity or while at rest. ? Watch closely for changes in your health, and be sure to contact your doctor if: 
? · You do not get better over the next 1 to 2 days. Where can you learn more? Go to http://gilda-michael.info/. Enter S780 in the search box to learn more about \"Shortness of Breath: Care Instructions. \" Current as of: May 12, 2017 Content Version: 11.4 © 4514-0766 [employer]. Care instructions adapted under license by [employer] (which disclaims liability or warranty for this information). If you have questions about a medical condition or this instruction, always ask your healthcare professional. [address] any warranty or liability for your use of this information. Introducing [facility]! Dear Javier Tay: Thank you for requesting a shenzhoufu account. Our records indicate that you already have an active shenzhoufu account. You can access your account anytime at [email or web address]. [employer] [email or web address] Did you know that you can access your hospital and ER discharge instructions at any time in shenzhoufu? You can also review all of your test results from your hospital stay or ER visit. Additional Information If you have questions, please visit the Frequently Asked Questions section of the shenzhoufu website at [email or web address] Remember, shenzhoufu is NOT to be used for urgent needs. For medical emergencies, dial 911. Now available from your iPhone and Android! Please provide this summary of care documentation to your next provider. Your primary care clinician is listed as Ananth 51. If you have any questions after today's visit, please call [phone].

## 2018-06-13 NOTE — PROGRESS NOTES
Assessment/Plan:    *Diagnoses and all orders for this visit:    1. SOB (shortness of breath) on exertion  -     PULMONARY FUNCTION TEST; Future  -     AMB POC EKG ROUTINE W/ 12 LEADS, INTER & REP  -     NUCLEAR STRESS TEST; Future  -     NM CARDIAC SPECT W STRS / REST MULT; Future  -     2D ECHO COMPLETE ADULT (TTE) W OR WO CONTR; Future    2. Chest pressure  -     AMB POC EKG ROUTINE W/ 12 LEADS, INTER & REP  -     NUCLEAR STRESS TEST; Future  -     NM CARDIAC SPECT W STRS / REST MULT; Future    3. Generalized arthritis    4. Medicare annual wellness visit, subsequent    5. Hypothyroidism due to acquired atrophy of thyroid    6. Pure hypercholesterolemia    7. Gastroesophageal reflux disease without esophagitis    Other orders  -     atorvastatin (LIPITOR) 20 mg tablet; TAKE 1 TABLET (20 MG) BY MOUTH ONCE DAILY  -     levothyroxine (SYNTHROID) 50 mcg tablet; Take 1 Tab by mouth Daily (before breakfast). -     verapamil ER (VERELAN) 360 mg ER capsule; Take 1 Cap by mouth daily. -     metoprolol succinate (TOPROL-XL) 25 mg XL tablet; Take 1 Tab by mouth daily. Indications: hypertension        Pt will f/u after the above tests. Will monitor the cough for now. Will consider adding Zantac 150 mg at bedtime if needed and if above tests are fine. The plan was discussed with the patient. The patient verbalized understanding and is in agreement with the plan. All medication potential side effects were discussed with the patient.    -------------------------------------------------------------------------------------------------------------------        Deb Trujillo is a 67 y.o. female and presents with Annual Wellness Visit         Subjective:  Pt having SOB with exertion. Has noted it with activities. Has risk factors for CAD like HTN and HLD. Along with this are other various symptoms, cough, nasal congestion. The cough is usually at night when laying down.   Has reflux that is treated with Dexilant. HTN: well controlled. HLD: stable. ROS:  Constitutional: No recent weight change. No weakness/fatigue. No f/c. Skin: No rashes, change in nails/hair, itching   HENT: No HA, dizziness. No hearing loss/tinnitus. No nasal congestion/discharge. Eyes: No change in vision, double/blurred vision or eye pain/redness. Cardiovascular: No CP/palpitations. No BURCH/orthopnea/PND. Respiratory: No cough/sputum, dyspnea, wheezing. Gastointestinal: No dysphagia, reflux. No n/v. No constipation/diarrhea. No melena/rectal bleeding. Genitourinary: No dysuria, urinary hesitancy, nocturia, hematuria. No incontinence. Musculoskeletal: No joint pain/stiffness. No muscle pain/tenderness. Endo: No heat/cold intolerance, no polyuria/polydypsia. Heme: No h/o anemia. No easy bleeding/bruising. Allergy/Immunology: No seasonal rhinitis. Denies frequent colds, sinus/ear infections. Neurological: No seizures/numbness/weakness. No paresthesias. Psychiatric:  No depression, anxiety. The problem list was updated as a part of today's visit.   Patient Active Problem List   Diagnosis Code    Hyperlipidemia E78.5    Connective tissue disease ([address] M35.9    Depression F32.9    HH (hiatus hernia) K44.9    Gastroesophageal reflux disease K21.9    Rosacea L71.9    Capsulitis M77.9    Degeneration of intervertebral disc of cervical region M50.30    Allergy T78.40XA    Colitis, ischemic ([address] K55.9    Anxiety associated with depression F41.8    Raynaud's syndrome I73.00    Hypothyroidism E03.9    Pulmonary nodules R91.8    Renal cyst N28.1    Hypovitaminosis D E55.9    Biliary dyskinesia K82.8    Osteoarthritis M19.90    Dyslipidemia E78.5    Classical migraine with intractable migraine G43.119    Rotator cuff tear arthropathy M12.819    Lower gastrointestinal hemorrhage K92.2    Cervico-occipital neuralgia M54.81    History of cervical spinal surgery Z98.890    History of knee surgery Z98.890    Diarrhea R19.7    Osteopenia M85.80    Generalized arthritis M19.90    Essential hypertension I10       The PSH, FH were reviewed. SH:  Social History   Substance Use Topics    Smoking status: Never Smoker    Smokeless tobacco: Never Used    Alcohol use No       Medications/Allergies:  Current Outpatient Prescriptions on File Prior to Visit   Medication Sig Dispense Refill    naratriptan (AMERGE) 2.5 mg tab Take 2.5 mg by mouth once as needed.  divalproex ER (DEPAKOTE ER) 500 mg ER tablet   1    DULoxetine (CYMBALTA) 30 mg capsule Take 30 mg by mouth two (2) times a day.  desonide (TRIDESILON) 0.05 % topical lotion Apply  to affected area two (2) times a day.  buPROPion (WELLBUTRIN) 100 mg tablet Take 100 mg by mouth two (2) times a day.  Dexlansoprazole (DEXILANT) 60 mg CpDB Take 1 Cap by mouth daily. 90 Cap 2    calcium-cholecalciferol, d3, (CALCIUM 600 + D) 600-125 mg-unit tab Take  by mouth two (2) times a day.  coenzyme q10 (CHEW Q) 100 mg chew Take 200 mg by mouth daily.  multivitamin (ONE A DAY) tablet Take 1 Tab by mouth daily.  magnesium Tab Take  by mouth two (2) times a day. No current facility-administered medications on file prior to visit.          Allergies   Allergen Reactions    Pcn [Penicillins] Rash     Was tested is no longer allergic         Health Maintenance:   Health Maintenance   Topic Date Due    GLAUCOMA SCREENING Q2Y  07/07/2012    MEDICARE YEARLY EXAM  06/08/2018    Influenza Age 5 to Adult  08/01/2018    BREAST CANCER SCRN MAMMOGRAM  08/03/2019    COLONOSCOPY  07/07/2022    DTaP/Tdap/Td series (3 - Td) 04/22/2026    Hepatitis C Screening  Completed    Bone Densitometry (Dexa) Screening  Completed    ZOSTER VACCINE AGE 60>  Completed    Pneumococcal 65+ Low/Medium Risk  Completed       Objective:  Visit Vitals    /82    Pulse 96    Temp 97.7 °F (36.5 °C) (Oral)    Resp 18    Ht 5' 7\" (1.702 m)    Wt 156 lb 6.4 oz (70.9 kg)    LMP  (LMP Unknown)    SpO2 99%    BMI 24.5 kg/m2          Nurses notes and VS reviewed. Physical Examination: General appearance - alert, well appearing, and in no distress  Ears - bilateral TM's and external ear canals normal  Mouth - mucous membranes moist, pharynx normal without lesions  Neck - supple, no significant adenopathy  Chest - clear to auscultation, no wheezes, rales or rhonchi, symmetric air entry  Heart - normal rate, regular rhythm, normal S1, S2, no murmurs, rubs, clicks or gallops  Abdomen - soft, nontender, nondistended, no masses or organomegaly  Musculoskeletal - no joint tenderness, deformity or swelling  Extremities - peripheral pulses normal, no pedal edema, no clubbing or cyanosis        Labwork and Ancillary Studies:    CBC w/Diff  Lab Results   Component Value Date/Time    WBC 6.9 06/09/2017 11:40 AM    HGB 16.1 (H) 06/09/2017 11:40 AM    PLATELET 891 30/64/5996 11:40 AM         Basic Metabolic Profile  Lab Results   Component Value Date/Time    Sodium 140 06/09/2017 11:40 AM    Potassium 3.8 06/09/2017 11:40 AM    Chloride 107 06/09/2017 11:40 AM    CO2 24 06/09/2017 11:40 AM    Anion gap 9 06/09/2017 11:40 AM    Glucose 107 (H) 06/09/2017 11:40 AM    BUN 16 06/09/2017 11:40 AM    Creatinine 0.96 06/09/2017 11:40 AM    BUN/Creatinine ratio 17 06/09/2017 11:40 AM    GFR est AA >60 06/09/2017 11:40 AM    GFR est non-AA 57 (L) 06/09/2017 11:40 AM    Calcium 9.6 06/09/2017 11:40 AM         LFT  Lab Results   Component Value Date/Time    ALT (SGPT) 17 06/09/2017 11:40 AM    AST (SGOT) 16 06/09/2017 11:40 AM    Alk.  phosphatase 70 06/09/2017 11:40 AM    Bilirubin, direct 0.1 06/09/2017 11:40 AM    Bilirubin, total 0.4 06/09/2017 11:40 AM         Cholesterol  Lab Results   Component Value Date/Time    Cholesterol, total 167 06/09/2017 11:40 AM    HDL Cholesterol 69 (H) 06/09/2017 11:40 AM    LDL, calculated 83 06/09/2017 11:40 AM    Triglyceride 75 06/09/2017 11:40 AM    CHOL/HDL Ratio 2.4 06/09/2017 11:40 AM

## 2018-06-15 ENCOUNTER — HOSPITAL ENCOUNTER (OUTPATIENT)
Dept: LAB | Age: 73
Discharge: HOME | End: 2018-06-15
Payer: MEDICARE

## 2018-06-15 DIAGNOSIS — E78.00 PURE HYPERCHOLESTEROLEMIA: ICD-10-CM

## 2018-06-15 DIAGNOSIS — E55.9 HYPOVITAMINOSIS D: ICD-10-CM

## 2018-06-15 LAB
ALBUMIN SERPL-MCNC: 3.9 G/DL (ref 3.4–5)
ALBUMIN/GLOB SERPL: 1.2 {RATIO} (ref 0.8–1.7)
ALP SERPL-CCNC: 67 U/L (ref 45–117)
ALT SERPL-CCNC: 17 U/L (ref 13–56)
ANION GAP SERPL CALC-SCNC: 8 MMOL/L (ref 3–18)
APPEARANCE UR: CLEAR
AST SERPL-CCNC: 17 U/L (ref 15–37)
BASOPHILS # BLD: 0.1 K/UL (ref 0–0.06)
BASOPHILS NFR BLD: 1 % (ref 0–2)
BILIRUB DIRECT SERPL-MCNC: 0.2 MG/DL (ref 0–0.2)
BILIRUB SERPL-MCNC: 0.5 MG/DL (ref 0.2–1)
BILIRUB UR QL: NEGATIVE
BUN SERPL-MCNC: 19 MG/DL (ref 7–18)
BUN/CREAT SERPL: 20 (ref 12–20)
CALCIUM SERPL-MCNC: 9.2 MG/DL (ref 8.5–10.1)
CHLORIDE SERPL-SCNC: 99 MMOL/L (ref 100–108)
CHOLEST SERPL-MCNC: 144 MG/DL
CO2 SERPL-SCNC: 34 MMOL/L (ref 21–32)
COLOR UR: YELLOW
CREAT SERPL-MCNC: 0.97 MG/DL (ref 0.6–1.3)
DIFFERENTIAL METHOD BLD: ABNORMAL
EOSINOPHIL # BLD: 0.2 K/UL (ref 0–0.4)
EOSINOPHIL NFR BLD: 4 % (ref 0–5)
ERYTHROCYTE [DISTWIDTH] IN BLOOD BY AUTOMATED COUNT: 13.9 % (ref 11.6–14.5)
GLOBULIN SER CALC-MCNC: 3.2 G/DL (ref 2–4)
GLUCOSE SERPL-MCNC: 99 MG/DL (ref 74–99)
GLUCOSE UR STRIP.AUTO-MCNC: NEGATIVE MG/DL
HCT VFR BLD AUTO: 44.4 % (ref 35–45)
HDLC SERPL-MCNC: 61 MG/DL (ref 40–60)
HDLC SERPL: 2.4 {RATIO} (ref 0–5)
HGB BLD-MCNC: 14.5 G/DL (ref 12–16)
HGB UR QL STRIP: NEGATIVE
KETONES UR QL STRIP.AUTO: NEGATIVE MG/DL
LDLC SERPL CALC-MCNC: 64.4 MG/DL (ref 0–100)
LEUKOCYTE ESTERASE UR QL STRIP.AUTO: NEGATIVE
LIPID PROFILE,FLP: ABNORMAL
LYMPHOCYTES # BLD: 1.9 K/UL (ref 0.9–3.6)
LYMPHOCYTES NFR BLD: 44 % (ref 21–52)
MCH RBC QN AUTO: 30.9 PG (ref 24–34)
MCHC RBC AUTO-ENTMCNC: 32.7 G/DL (ref 31–37)
MCV RBC AUTO: 94.7 FL (ref 74–97)
MONOCYTES # BLD: 0.5 K/UL (ref 0.05–1.2)
MONOCYTES NFR BLD: 11 % (ref 3–10)
NEUTS SEG # BLD: 1.7 K/UL (ref 1.8–8)
NEUTS SEG NFR BLD: 40 % (ref 40–73)
NITRITE UR QL STRIP.AUTO: NEGATIVE
PH UR STRIP: 8.5 [PH] (ref 5–8)
PLATELET # BLD AUTO: 216 K/UL (ref 135–420)
PMV BLD AUTO: 10.3 FL (ref 9.2–11.8)
POTASSIUM SERPL-SCNC: 3.6 MMOL/L (ref 3.5–5.5)
PROT SERPL-MCNC: 7.1 G/DL (ref 6.4–8.2)
PROT UR STRIP-MCNC: NEGATIVE MG/DL
RBC # BLD AUTO: 4.69 M/UL (ref 4.2–5.3)
SODIUM SERPL-SCNC: 141 MMOL/L (ref 136–145)
SP GR UR REFRACTOMETRY: 1.02 (ref 1–1.03)
T4 FREE SERPL-MCNC: 1 NG/DL (ref 0.7–1.5)
TRIGL SERPL-MCNC: 93 MG/DL (ref ?–150)
TSH SERPL DL<=0.05 MIU/L-ACNC: 2.69 UIU/ML (ref 0.36–3.74)
UROBILINOGEN UR QL STRIP.AUTO: 1 EU/DL (ref 0.2–1)
VLDLC SERPL CALC-MCNC: 18.6 MG/DL
WBC # BLD AUTO: 4.3 K/UL (ref 4.6–13.2)

## 2018-06-15 PROCEDURE — 80048 BASIC METABOLIC PNL TOTAL CA: CPT | Performed by: INTERNAL MEDICINE

## 2018-06-15 PROCEDURE — 84443 ASSAY THYROID STIM HORMONE: CPT | Performed by: INTERNAL MEDICINE

## 2018-06-15 PROCEDURE — 84439 ASSAY OF FREE THYROXINE: CPT | Performed by: INTERNAL MEDICINE

## 2018-06-15 PROCEDURE — 82306 VITAMIN D 25 HYDROXY: CPT | Performed by: INTERNAL MEDICINE

## 2018-06-15 PROCEDURE — 36415 COLL VENOUS BLD VENIPUNCTURE: CPT | Performed by: INTERNAL MEDICINE

## 2018-06-15 PROCEDURE — 80061 LIPID PANEL: CPT | Performed by: INTERNAL MEDICINE

## 2018-06-15 PROCEDURE — 81003 URINALYSIS AUTO W/O SCOPE: CPT | Performed by: INTERNAL MEDICINE

## 2018-06-15 PROCEDURE — 85025 COMPLETE CBC W/AUTO DIFF WBC: CPT | Performed by: INTERNAL MEDICINE

## 2018-06-15 PROCEDURE — 80076 HEPATIC FUNCTION PANEL: CPT | Performed by: INTERNAL MEDICINE

## 2018-06-16 LAB — 25(OH)D3 SERPL-MCNC: 57.4 NG/ML (ref 30–100)

## 2018-07-09 ENCOUNTER — TELEPHONE (OUTPATIENT)
Dept: FAMILY MEDICINE CLINIC | Age: 73
End: 2018-07-09

## 2018-07-11 ENCOUNTER — TELEPHONE (OUTPATIENT)
Dept: FAMILY MEDICINE CLINIC | Age: 73
End: 2018-07-11

## 2018-07-11 NOTE — TELEPHONE ENCOUNTER
Pt called returning a call to Monae about some test that she had gotten done at [facility]. Requesting a call back at her mobile number.

## 2018-07-30 ENCOUNTER — OFFICE VISIT (OUTPATIENT)
Dept: FAMILY MEDICINE CLINIC | Age: 73
End: 2018-07-30

## 2018-07-30 VITALS
DIASTOLIC BLOOD PRESSURE: 66 MMHG | WEIGHT: 162.4 LBS | SYSTOLIC BLOOD PRESSURE: 104 MMHG | HEART RATE: 77 BPM | BODY MASS INDEX: 25.49 KG/M2 | RESPIRATION RATE: 17 BRPM | HEIGHT: 67 IN | TEMPERATURE: 97.6 F | OXYGEN SATURATION: 98 %

## 2018-07-30 DIAGNOSIS — I10 ESSENTIAL HYPERTENSION: Primary | ICD-10-CM

## 2018-07-30 RX ORDER — CHLORTHALIDONE 25 MG/1
TABLET ORAL DAILY
COMMUNITY
End: 2018-07-30 | Stop reason: CLARIF

## 2018-07-30 NOTE — PROGRESS NOTES
Assessment/Plan:    *Diagnoses and all orders for this visit:    1. Essential hypertension      Pt will do a trial off Toprol and monitor her BP at home at the same time. Will see what happens with her fatigue. She will notify me if her BP gets uncontrolled. The plan was discussed with the patient. The patient verbalized understanding and is in agreement with the plan. All medication potential side effects were discussed with the patient.    -------------------------------------------------------------------------------------------------------------------        Cari Silvestre is a 67 y.o. female and presents with Results and Medication Evaluation         Subjective:  Pt here to review recent cardiac and PFT results. All were normal.    HTN: she has been feeling tired quite a lot. She is on Toprol and we discussed how B blockers can do this. ROS:  Constitutional: No recent weight change. No weakness/fatigue. No f/c. Skin: No rashes, change in nails/hair, itching   HENT: No HA, dizziness. No hearing loss/tinnitus. No nasal congestion/discharge. Eyes: No change in vision, double/blurred vision or eye pain/redness. Cardiovascular: No CP/palpitations. No BURCH/orthopnea/PND. Respiratory: No cough/sputum, dyspnea, wheezing. Gastointestinal: No dysphagia, reflux. No n/v. No constipation/diarrhea. No melena/rectal bleeding. Genitourinary: No dysuria, urinary hesitancy, nocturia, hematuria. No incontinence. Musculoskeletal: No joint pain/stiffness. No muscle pain/tenderness. Endo: No heat/cold intolerance, no polyuria/polydypsia. Heme: No h/o anemia. No easy bleeding/bruising. Allergy/Immunology: No seasonal rhinitis. Denies frequent colds, sinus/ear infections. Neurological: No seizures/numbness/weakness. No paresthesias. Psychiatric:  No depression, anxiety. The problem list was updated as a part of today's visit.   Patient Active Problem List   Diagnosis Code    Hyperlipidemia E78.5    Connective tissue disease (HCC) M35.9    Depression F32.9    HH (hiatus hernia) K44.9    Gastroesophageal reflux disease K21.9    Rosacea L71.9    Capsulitis M77.9    Degeneration of intervertebral disc of cervical region M50.30    Allergy T78.40XA    Colitis, ischemic (HCC) K55.9    Anxiety associated with depression F41.8    Raynaud's syndrome I73.00    Hypothyroidism E03.9    Pulmonary nodules R91.8    Renal cyst N28.1    Hypovitaminosis D E55.9    Biliary dyskinesia K82.8    Osteoarthritis M19.90    Dyslipidemia E78.5    Classical migraine with intractable migraine G43.119    Rotator cuff tear arthropathy M12.819    Lower gastrointestinal hemorrhage K92.2    Cervico-occipital neuralgia M54.81    History of cervical spinal surgery Z98.890    History of knee surgery Z98.890    Diarrhea R19.7    Osteopenia M85.80    Generalized arthritis M19.90    Essential hypertension I10       The PSH, FH were reviewed. SH:  Social History   Substance Use Topics    Smoking status: Never Smoker    Smokeless tobacco: Never Used    Alcohol use No       Medications/Allergies:  Current Outpatient Prescriptions on File Prior to Visit   Medication Sig Dispense Refill    atorvastatin (LIPITOR) 20 mg tablet TAKE 1 TABLET (20 MG) BY MOUTH ONCE DAILY 90 Tab 2    levothyroxine (SYNTHROID) 50 mcg tablet Take 1 Tab by mouth Daily (before breakfast). 90 Tab 2    verapamil ER (VERELAN) 360 mg ER capsule Take 1 Cap by mouth daily. 90 Cap 2    metoprolol succinate (TOPROL-XL) 25 mg XL tablet Take 1 Tab by mouth daily. Indications: hypertension 90 Tab 2    naratriptan (AMERGE) 2.5 mg tab Take 2.5 mg by mouth once as needed.  divalproex ER (DEPAKOTE ER) 500 mg ER tablet   1    DULoxetine (CYMBALTA) 30 mg capsule Take 30 mg by mouth two (2) times a day.  desonide (TRIDESILON) 0.05 % topical lotion Apply  to affected area two (2) times a day.       buPROPion [facility]) 100 mg tablet Take 100 mg by mouth two (2) times a day.  Dexlansoprazole (DEXILANT) 60 mg CpDB Take 1 Cap by mouth daily. 90 Cap 2    calcium-cholecalciferol, d3, (CALCIUM 600 + D) 600-125 mg-unit tab Take  by mouth two (2) times a day.  coenzyme q10 (CHEW Q) 100 mg chew Take 200 mg by mouth daily.  multivitamin (ONE A DAY) tablet Take 1 Tab by mouth daily.  magnesium Tab Take  by mouth two (2) times a day. No current facility-administered medications on file prior to visit. Allergies   Allergen Reactions    Pcn [Penicillins] Rash     Was tested is no longer allergic         Health Maintenance:   Health Maintenance   Topic Date Due    GLAUCOMA SCREENING Q2Y  07/07/2012    Influenza Age 5 to Adult  08/01/2018    MEDICARE YEARLY EXAM  06/14/2019    BREAST CANCER SCRN MAMMOGRAM  08/03/2019    COLONOSCOPY  07/07/2022    DTaP/Tdap/Td series (3 - Td) 04/22/2026    Hepatitis C Screening  Completed    Bone Densitometry (Dexa) Screening  Completed    ZOSTER VACCINE AGE 60>  Completed    Pneumococcal 65+ Low/Medium Risk  Completed       Objective:  Visit Vitals    /66 (BP 1 Location: Left arm, BP Patient Position: Sitting)    Pulse 77    Temp 97.6 °F (36.4 °C) (Oral)    Resp 17    Ht 5' 7\" (1.702 m)    Wt 162 lb 6.4 oz (73.7 kg)    LMP  (LMP Unknown)    SpO2 98%    BMI 25.44 kg/m2          Nurses notes and VS reviewed.       Physical Examination: General appearance - alert, well appearing, and in no distress  Chest - clear to auscultation, no wheezes, rales or rhonchi, symmetric air entry  Heart - normal rate, regular rhythm, normal S1, S2, no murmurs, rubs, clicks or gallops      Labwork and Ancillary Studies:    CBC w/Diff  Lab Results   Component Value Date/Time    WBC 4.3 (L) 06/15/2018 01:23 PM    HGB 14.5 06/15/2018 01:23 PM    PLATELET 466 24/06/8416 01:23 PM         Basic Metabolic Profile  Lab Results   Component Value Date/Time    Sodium 141 06/15/2018 01:23 PM    Potassium 3.6 06/15/2018 01:23 PM    Chloride 99 (L) 06/15/2018 01:23 PM    CO2 34 (H) 06/15/2018 01:23 PM    Anion gap 8 06/15/2018 01:23 PM    Glucose 99 06/15/2018 01:23 PM    BUN 19 (H) 06/15/2018 01:23 PM    Creatinine 0.97 06/15/2018 01:23 PM    BUN/Creatinine ratio 20 06/15/2018 01:23 PM    GFR est AA >60 06/15/2018 01:23 PM    GFR est non-AA 56 (L) 06/15/2018 01:23 PM    Calcium 9.2 06/15/2018 01:23 PM         LFT  Lab Results   Component Value Date/Time    ALT (SGPT) 17 06/15/2018 01:23 PM    AST (SGOT) 17 06/15/2018 01:23 PM    Alk.  phosphatase 67 06/15/2018 01:23 PM    Bilirubin, direct 0.2 06/15/2018 01:23 PM    Bilirubin, total 0.5 06/15/2018 01:23 PM         Cholesterol  Lab Results   Component Value Date/Time    Cholesterol, total 144 06/15/2018 01:23 PM    HDL Cholesterol 61 (H) 06/15/2018 01:23 PM    LDL, calculated 64.4 06/15/2018 01:23 PM    Triglyceride 93 06/15/2018 01:23 PM    CHOL/HDL Ratio 2.4 06/15/2018 01:23 PM

## 2018-07-30 NOTE — PATIENT INSTRUCTIONS

## 2018-07-30 NOTE — MR AVS SNAPSHOT
[facility] 
 
 
 [address] [facility] [id number] [phone] Patient: Breezy Stevens MRN: [id number] :1945 Visit Information Date & Time Provider Department Dept. Phone Encounter #  
 2018  3:00 PM Ayanna Torres, [address] [place] [phone] [id number] Upcoming Health Maintenance Date Due  
 GLAUCOMA SCREENING Q2Y 2012 Influenza Age 5 to Adult 2018 MEDICARE YEARLY EXAM 2019 BREAST CANCER SCRN MAMMOGRAM 8/3/2019 COLONOSCOPY 2022 DTaP/Tdap/Td series (3 - Td) 2026 Allergies as of 2018  Review Complete On: 2018 By: Stacy Blas LPN Severity Noted Reaction Type Reactions Pcn [Penicillins]  2010    Rash Was tested is no longer allergic Current Immunizations  Reviewed on 2016 Name Date Influenza High Dose Vaccine PF 10/6/2017, 10/27/2016  9:24 AM, 10/19/2015 10:00 AM  
 Influenza Vaccine 2014 Influenza Vaccine Split 10/16/2012, 2010 Influenza Vaccine Whole 10/5/2011 PPD 2010 Pneumococcal Polysaccharide (PPSV-23) 2014, 2014 TDAP Vaccine 2010 Td 2010 12:00 AM  
 Td, Adsorbed PF 2010 Tdap 2016 ZZZ-RETIRED (DO NOT USE) Pneumococcal Vaccine (Unspecified Type) 2007 Zoster 2009 Not reviewed this visit You Were Diagnosed With   
  
 Codes Comments Essential hypertension    -  Primary ICD-10-CM: I10 
ICD-9-CM: 401.9 Vitals BP Pulse Temp Resp Height(growth percentile) Weight(growth percentile) 104/66 (BP 1 Location: Left arm, BP Patient Position: Sitting) 77 97.6 °F (36.4 °C) (Oral) 17 5' 7\" (1.702 m) 162 lb 6.4 oz (73.7 kg) LMP SpO2 BMI OB Status Smoking Status (LMP Unknown) 98% 25.44 kg/m2 Postmenopausal Never Smoker Vitals History BMI and BSA Data Body Mass Index Body Surface Area 25.44 kg/m 2 1.87 m 2 Preferred Pharmacy Pharmacy Name Phone [facility] [address] [phone] Your Updated Medication List  
  
   
This list is accurate as of 7/30/18  3:34 PM.  Always use your most recent med list.  
  
  
  
  
 AMERGE 2.5 mg Tab Generic drug:  naratriptan Take 2.5 mg by mouth once as needed. atorvastatin 20 mg tablet Commonly known as:  LIPITOR  
TAKE 1 TABLET (20 MG) BY MOUTH ONCE DAILY CALCIUM 600 + D 600-125 mg-unit Tab Generic drug:  calcium-cholecalciferol (d3) Take  by mouth two (2) times a day. CHEW Q 100 mg Chew Generic drug:  coenzyme q10 Take 200 mg by mouth daily. CYMBALTA 30 mg capsule Generic drug:  DULoxetine Take 30 mg by mouth two (2) times a day. desonide 0.05 % topical lotion Commonly known as:  Mike Double Apply  to affected area two (2) times a day. Dexlansoprazole 60 mg Cpdb Commonly known as:  DEXILANT Take 1 Cap by mouth daily. divalproex  mg ER tablet Commonly known as:  DEPAKOTE ER  
  
 levothyroxine 50 mcg tablet Commonly known as:  SYNTHROID Take 1 Tab by mouth Daily (before breakfast). magnesium Tab Take  by mouth two (2) times a day. metoprolol succinate 25 mg XL tablet Commonly known as:  TOPROL-XL Take 1 Tab by mouth daily. Indications: hypertension  
  
 multivitamin tablet Commonly known as:  ONE A DAY Take 1 Tab by mouth daily. verapamil  mg ER capsule Commonly known as:  Cherylyn Martinez Take 1 Cap by mouth daily. WELLBUTRIN 100 mg tablet Generic drug:  buPROPion Take 100 mg by mouth two (2) times a day. Patient Instructions High Blood Pressure: Care Instructions Your Care Instructions If your blood pressure is usually above 130/80, you have high blood pressure, or hypertension. That means the top number is 130 or higher or the bottom number is 80 or higher, or both. Despite what a lot of people think, high blood pressure usually doesn't cause headaches or make you feel dizzy or lightheaded. It usually has no symptoms. But it does increase your risk for heart attack, stroke, and kidney or eye damage. The higher your blood pressure, the more your risk increases. Your doctor will give you a goal for your blood pressure. Your goal will be based on your health and your age. Lifestyle changes, such as eating healthy and being active, are always important to help lower blood pressure. You might also take medicine to reach your blood pressure goal. 
Follow-up care is a key part of your treatment and safety. Be sure to make and go to all appointments, and call your doctor if you are having problems. It's also a good idea to know your test results and keep a list of the medicines you take. How can you care for yourself at home? Medical treatment · If you stop taking your medicine, your blood pressure will go back up. You may take one or more types of medicine to lower your blood pressure. Be safe with medicines. Take your medicine exactly as prescribed. Call your doctor if you think you are having a problem with your medicine. · Talk to your doctor before you start taking aspirin every day. Aspirin can help certain people lower their risk of a heart attack or stroke. But taking aspirin isn't right for everyone, because it can cause serious bleeding. · See your doctor regularly. You may need to see the doctor more often at first or until your blood pressure comes down. · If you are taking blood pressure medicine, talk to your doctor before you take decongestants or anti-inflammatory medicine, such as ibuprofen. Some of these medicines can raise blood pressure. · Learn how to check your blood pressure at home. Lifestyle changes · Stay at a healthy weight. This is especially important if you put on weight around the waist. Losing even 10 pounds can help you lower your blood pressure. · If your doctor recommends it, get more exercise. Walking is a good choice. Bit by bit, increase the amount you walk every day. Try for at least 30 minutes on most days of the week. You also may want to swim, bike, or do other activities. · Avoid or limit alcohol. Talk to your doctor about whether you can drink any alcohol. · Try to limit how much sodium you eat to less than 2,300 milligrams (mg) a day. Your doctor may ask you to try to eat less than 1,500 mg a day. · Eat plenty of fruits (such as bananas and oranges), vegetables, legumes, whole grains, and low-fat dairy products. · Lower the amount of saturated fat in your diet. Saturated fat is found in animal products such as milk, cheese, and meat. Limiting these foods may help you lose weight and also lower your risk for heart disease. · Do not smoke. Smoking increases your risk for heart attack and stroke. If you need help quitting, talk to your doctor about stop-smoking programs and medicines. These can increase your chances of quitting for good. When should you call for help? Call 911 anytime you think you may need emergency care. This may mean having symptoms that suggest that your blood pressure is causing a serious heart or blood vessel problem. Your blood pressure may be over 180/110. 
 For example, call 911 if: 
  · You have symptoms of a heart attack. These may include: ¨ Chest pain or pressure, or a strange feeling in the chest. 
¨ Sweating. ¨ Shortness of breath. ¨ Nausea or vomiting. ¨ Pain, pressure, or a strange feeling in the back, neck, jaw, or upper belly or in one or both shoulders or arms. ¨ Lightheadedness or sudden weakness. ¨ A fast or irregular heartbeat.  
  · You have symptoms of a stroke. These may include: ¨ Sudden numbness, tingling, weakness, or loss of movement in your face, arm, or leg, especially on only one side of your body. ¨ Sudden vision changes. ¨ Sudden trouble speaking. ¨ Sudden confusion or trouble understanding simple statements. ¨ Sudden problems with walking or balance. ¨ A sudden, severe headache that is different from past headaches.  
  · You have severe back or belly pain.  
 Do not wait until your blood pressure comes down on its own. Get help right away. 
 Call your doctor now or seek immediate care if: 
  · Your blood pressure is much higher than normal (such as 180/110 or higher), but you don't have symptoms.  
  · You think high blood pressure is causing symptoms, such as: ¨ Severe headache. ¨ Blurry vision.  
 Watch closely for changes in your health, and be sure to contact your doctor if: 
  · Your blood pressure measures 140/90 or higher at least 2 times. That means the top number is 140 or higher or the bottom number is 90 or higher, or both.  
  · You think you may be having side effects from your blood pressure medicine.  
  · Your blood pressure is usually normal, but it goes above normal at least 2 times. Where can you learn more? Go to http://gilda-michael.info/. Enter A539 in the search box to learn more about \"High Blood Pressure: Care Instructions. \" Current as of: December 6, 2017 Content Version: 11.7 © 9487-1575 [employer]. Care instructions adapted under license by [employer] (which disclaims liability or warranty for this information). If you have questions about a medical condition or this instruction, always ask your healthcare professional. [place] [address] any warranty or liability for your use of this information. Introducing [facility]! Dear Neetu Cheek: Thank you for requesting a [employer] account.   Our records indicate that you already have an active [employer] account. You can access your account anytime at [email or web address] Did you know that you can access your hospital and ER discharge instructions at any time in [employer]P? You can also review all of your test results from your hospital stay or ER visit. Additional Information If you have questions, please visit the Frequently Asked Questions section of the [employer] website at [email or web address] Remember, ZALP is NOT to be used for urgent needs. For medical emergencies, dial 911. Now available from your iPhone and Android! Please provide this summary of care documentation to your next provider. Your primary care clinician is listed as Ananth Irizarry. If you have any questions after today's visit, please call [phone].

## 2018-07-30 NOTE — PROGRESS NOTES
Caridad Thomas is a 67 y.o. female (: 1945) presenting to address:    Chief Complaint   Patient presents with    Results    Medication Evaluation       Vitals:    18 1447   BP: 104/66   Pulse: 77   Resp: 17   Temp: 97.6 °F (36.4 °C)   TempSrc: Oral   SpO2: 98%   Weight: 162 lb 6.4 oz (73.7 kg)   Height: 5' 7\" (1.702 m)   PainSc:   0 - No pain       Hearing/Vision:   No exam data present    Learning Assessment:     Learning Assessment 2/3/2015   PRIMARY LEARNER Patient   HIGHEST LEVEL OF EDUCATION - PRIMARY LEARNER  4 YEARS OF COLLEGE   BARRIERS PRIMARY LEARNER NONE   CO-LEARNER CAREGIVER No   PRIMARY LANGUAGE ENGLISH   LEARNER PREFERENCE PRIMARY LISTENING   ANSWERED BY self   RELATIONSHIP SELF     Depression Screening:     PHQ over the last two weeks 2017   Little interest or pleasure in doing things Not at all   Feeling down, depressed, irritable, or hopeless Not at all   Total Score PHQ 2 0     Fall Risk Assessment:     Fall Risk Assessment, last 12 mths 2018   Able to walk? Yes   Fall in past 12 months? Yes   Fall with injury? Yes   Number of falls in past 12 months 1   Fall Risk Score 2     Abuse Screening:     Abuse Screening Questionnaire 2017   Do you ever feel afraid of your partner? N   Are you in a relationship with someone who physically or mentally threatens you? N   Is it safe for you to go home? Y     Coordination of Care Questionaire:   1. Have you been to the ER, urgent care clinic since your last visit? Hospitalized since your last visit? NO    2. Have you seen or consulted any other health care providers outside of the [facility] since your last visit? Include any pap smears or colon screening. YES Derm 2018. Advanced Directive:   1. Do you have an Advanced Directive? NO    2. Would you like information on Advanced Directives?  NO

## 2018-10-19 ENCOUNTER — OFFICE VISIT (OUTPATIENT)
Dept: FAMILY MEDICINE CLINIC | Age: 73
End: 2018-10-19

## 2018-10-19 VITALS
HEIGHT: 67 IN | RESPIRATION RATE: 20 BRPM | WEIGHT: 160 LBS | DIASTOLIC BLOOD PRESSURE: 80 MMHG | HEART RATE: 72 BPM | TEMPERATURE: 98 F | SYSTOLIC BLOOD PRESSURE: 110 MMHG | OXYGEN SATURATION: 99 % | BODY MASS INDEX: 25.11 KG/M2

## 2018-10-19 DIAGNOSIS — S62.102A CLOSED FRACTURE OF LEFT WRIST, INITIAL ENCOUNTER: ICD-10-CM

## 2018-10-19 DIAGNOSIS — S82.892S OTHER FRACTURE OF LEFT LOWER LEG, SEQUELA: Primary | ICD-10-CM

## 2018-10-19 NOTE — PROGRESS NOTES
Assessment/Plan: *Diagnoses and all orders for this visit: 
 
Other fracture of left lower leg, sequela Closed fracture of left wrist, initial encounter Routine f/u in Dec. 
 
The plan was discussed with the patient. The patient verbalized understanding and is in agreement with the plan. All medication potential side effects were discussed with the patient. 
 
------------------------------------------------------------------------------------------------------------------- Shayne Joshi is a 67 y.o. female and presents with Foot Injury (Right) Subjective: 
Pt here for f/u after an unfortunate fall at home, on the deck. She fractured LT leg and LT wrist.  Has surgical repair on 9/18/18. Went to [facility], [facility]. for 3 weeks for therapy and came home 3 days ago. She has [facility] coming out to her and doing PT. She is to see Ortho next week. She is doing as well as can be expected. ROS: 
Review of Systems - Negative except as above. The problem list was updated as a part of today's visit. Patient Active Problem List  
Diagnosis Code  Hyperlipidemia E78.5  Connective tissue disease ([facility] [address] M35.9  Depression F32.9  
 HH (hiatus hernia) K44.9  Gastroesophageal reflux disease K21.9  Rosacea L71.9  
 Capsulitis M77.9  Degeneration of intervertebral disc of cervical region M50.30  Allergy T78.40XA  Colitis, ischemic (HCC) K55.9  Anxiety associated with depression F41.8  Raynaud's syndrome I73.00  Hypothyroidism E03.9  Pulmonary nodules R91.8  Renal cyst N28.1  Hypovitaminosis D E55.9  Biliary dyskinesia K82.8  Osteoarthritis M19.90  Dyslipidemia E78.5  Classical migraine with intractable migraine G43.119  
 Rotator cuff tear arthropathy M75.100, M12.819  Lower gastrointestinal hemorrhage K92.2  Cervico-occipital neuralgia M54.81  
 History of cervical spinal surgery Z98.890  
 History of knee surgery Z98.890  
  Diarrhea R19.7  Osteopenia M85.80  Generalized arthritis M19.90  
 Essential hypertension I10 The PSH, FH were reviewed. SH: Social History Tobacco Use  Smoking status: Never Smoker  Smokeless tobacco: Never Used Substance Use Topics  Alcohol use: No  
 Drug use: No  
 
 
Medications/Allergies: 
Current Outpatient Medications on File Prior to Visit Medication Sig Dispense Refill  atorvastatin (LIPITOR) 20 mg tablet TAKE 1 TABLET (20 MG) BY MOUTH ONCE DAILY 90 Tab 2  
 levothyroxine (SYNTHROID) 50 mcg tablet Take 1 Tab by mouth Daily (before breakfast). 90 Tab 2  verapamil ER (VERELAN) 360 mg ER capsule Take 1 Cap by mouth daily. 90 Cap 2  
 naratriptan (AMERGE) 2.5 mg tab Take 2.5 mg by mouth once as needed.  divalproex ER (DEPAKOTE ER) 500 mg ER tablet   1  
 DULoxetine (CYMBALTA) 30 mg capsule Take 30 mg by mouth two (2) times a day.  desonide (TRIDESILON) 0.05 % topical lotion Apply  to affected area two (2) times a day.  buPROPion (WELLBUTRIN) 100 mg tablet Take 100 mg by mouth two (2) times a day.  Dexlansoprazole (DEXILANT) 60 mg CpDB Take 1 Cap by mouth daily. 90 Cap 2  
 calcium-cholecalciferol, d3, (CALCIUM 600 + D) 600-125 mg-unit tab Take  by mouth two (2) times a day.  coenzyme q10 (CHEW Q) 100 mg chew Take 200 mg by mouth daily.  multivitamin (ONE A DAY) tablet Take 1 Tab by mouth daily.  magnesium Tab Take  by mouth two (2) times a day. No current facility-administered medications on file prior to visit. Allergies Allergen Reactions  Pcn [Penicillins] Rash Was tested is no longer allergic Health Maintenance:  
Health Maintenance Topic Date Due  Shingrix Vaccine Age 50> (1 of 2) 11/27/1995  GLAUCOMA SCREENING Q2Y  07/07/2012  MEDICARE YEARLY EXAM  06/14/2019  BREAST CANCER SCRN MAMMOGRAM  08/08/2020  COLONOSCOPY  07/07/2022  DTaP/Tdap/Td series (3 - Td) 04/22/2026  Hepatitis C Screening  Completed  Bone Densitometry (Dexa) Screening  Completed  Pneumococcal 65+ Low/Medium Risk  Completed  Influenza Age 5 to Adult  Completed Objective: 
Visit Vitals /80 (BP 1 Location: Right arm, BP Patient Position: Sitting) Pulse 72 Temp 98 °F (36.7 °C) (Oral) Resp 20 Ht 5' 7\" (1.702 m) Wt 160 lb (72.6 kg) LMP  (LMP Unknown) SpO2 99% BMI 25.06 kg/m² Nurses notes and VS reviewed. Physical Examination: General appearance - alert, well appearing, and in no distress Extremities - abnormal exam of LT leg: sutures in place with steri-strips, wound clean no infection. Lt wrist: steri-strips in place, healing well. Labwork and Ancillary Studies: CBC w/Diff Lab Results Component Value Date/Time WBC 4.3 (L) 06/15/2018 01:23 PM  
 HGB 14.5 06/15/2018 01:23 PM  
 PLATELET 645 54/93/3596 01:23 PM  
  
 
 Basic Metabolic Profile Lab Results Component Value Date/Time Sodium 141 06/15/2018 01:23 PM  
 Potassium 3.6 06/15/2018 01:23 PM  
 Chloride 99 (L) 06/15/2018 01:23 PM  
 CO2 34 (H) 06/15/2018 01:23 PM  
 Anion gap 8 06/15/2018 01:23 PM  
 Glucose 99 06/15/2018 01:23 PM  
 BUN 19 (H) 06/15/2018 01:23 PM  
 Creatinine 0.97 06/15/2018 01:23 PM  
 BUN/Creatinine ratio 20 06/15/2018 01:23 PM  
 GFR est AA >60 06/15/2018 01:23 PM  
 GFR est non-AA 56 (L) 06/15/2018 01:23 PM  
 Calcium 9.2 06/15/2018 01:23 PM  
  
  
LFT Lab Results Component Value Date/Time ALT (SGPT) 17 06/15/2018 01:23 PM  
 AST (SGOT) 17 06/15/2018 01:23 PM  
 Alk. phosphatase 67 06/15/2018 01:23 PM  
 Bilirubin, direct 0.2 06/15/2018 01:23 PM  
 Bilirubin, total 0.5 06/15/2018 01:23 PM  
 
 
 
Cholesterol Lab Results Component Value Date/Time  Cholesterol, total 144 06/15/2018 01:23 PM  
 HDL Cholesterol 61 (H) 06/15/2018 01:23 PM  
 LDL, calculated 64.4 06/15/2018 01:23 PM  
 Triglyceride 93 06/15/2018 01:23 PM  
 CHOL/HDL Ratio 2.4 06/15/2018 01:23 PM

## 2018-10-19 NOTE — PROGRESS NOTES
Kristi Méndez is a 67 y.o. female (: 1945) presenting to address: Chief Complaint Patient presents with  Foot Injury Right Vitals:  
 10/19/18 1025 BP: 110/80 Pulse: 72 Resp: 20 Temp: 98 °F (36.7 °C) TempSrc: Oral  
SpO2: 99% Weight: 160 lb (72.6 kg) Height: 5' 7\" (1.702 m) PainSc:   6 PainLoc: Foot Learning Assessment:  
 
Learning Assessment 2/3/2015 PRIMARY LEARNER Patient HIGHEST LEVEL OF EDUCATION - PRIMARY LEARNER  4 YEARS OF COLLEGE  
BARRIERS PRIMARY LEARNER NONE  
CO-LEARNER CAREGIVER No  
PRIMARY LANGUAGE ENGLISH  
LEARNER PREFERENCE PRIMARY LISTENING  
ANSWERED BY self RELATIONSHIP SELF Depression Screening: PHQ over the last two weeks 2017 Little interest or pleasure in doing things Not at all Feeling down, depressed, irritable, or hopeless Not at all Total Score PHQ 2 0 Fall Risk Assessment:  
 
Fall Risk Assessment, last 12 mths 10/19/2018 Able to walk? Yes Fall in past 12 months? Yes Fall with injury? Yes  
Number of falls in past 12 months 1 Fall Risk Score 2 Abuse Screening:  
 
Abuse Screening Questionnaire 2017 Do you ever feel afraid of your partner? Carie Portal Are you in a relationship with someone who physically or mentally threatens you? Carie Portal Is it safe for you to go home? Aubree Neumann Coordination of Care Questionaire: 1. Have you been to the ER, urgent care clinic since your last visit? Hospitalized since your last visit? YES [facility], [facility] [address] 2. Have you seen or consulted any other health care providers outside of the [facility] since your last visit? Include any pap smears or colon screening. NO Advanced Directive: 1. Do you have an Advanced Directive? YES 
 
2. Would you like information on Advanced Directives?  NO

## 2018-12-12 RX ORDER — VERAPAMIL HYDROCHLORIDE 360 MG/1
CAPSULE, DELAYED RELEASE PELLETS ORAL
Qty: 90 CAP | Refills: 2 | Status: SHIPPED | OUTPATIENT
Start: 2018-12-12 | End: 2019-03-19

## 2019-01-16 ENCOUNTER — OFFICE VISIT (OUTPATIENT)
Dept: FAMILY MEDICINE CLINIC | Age: 74
End: 2019-01-16

## 2019-01-16 ENCOUNTER — HOSPITAL ENCOUNTER (OUTPATIENT)
Dept: LAB | Age: 74
Discharge: HOME | End: 2019-01-16
Payer: MEDICARE

## 2019-01-16 VITALS
WEIGHT: 166 LBS | OXYGEN SATURATION: 99 % | DIASTOLIC BLOOD PRESSURE: 84 MMHG | HEIGHT: 67 IN | BODY MASS INDEX: 26.06 KG/M2 | HEART RATE: 60 BPM | TEMPERATURE: 97.9 F | RESPIRATION RATE: 18 BRPM | SYSTOLIC BLOOD PRESSURE: 130 MMHG

## 2019-01-16 DIAGNOSIS — E03.4 HYPOTHYROIDISM DUE TO ACQUIRED ATROPHY OF THYROID: ICD-10-CM

## 2019-01-16 DIAGNOSIS — I10 ESSENTIAL HYPERTENSION: Primary | ICD-10-CM

## 2019-01-16 DIAGNOSIS — M25.532 LEFT WRIST PAIN: ICD-10-CM

## 2019-01-16 DIAGNOSIS — R40.0 DROWSINESS: ICD-10-CM

## 2019-01-16 LAB
T4 FREE SERPL-MCNC: 1.1 NG/DL (ref 0.7–1.5)
TSH SERPL DL<=0.05 MIU/L-ACNC: 0.79 UIU/ML (ref 0.36–3.74)

## 2019-01-16 PROCEDURE — 84439 ASSAY OF FREE THYROXINE: CPT

## 2019-01-16 PROCEDURE — 84443 ASSAY THYROID STIM HORMONE: CPT

## 2019-01-16 PROCEDURE — 36415 COLL VENOUS BLD VENIPUNCTURE: CPT

## 2019-01-16 NOTE — PROGRESS NOTES
Josetta Kanner is a 68 y.o. female (: 1945) presenting to address: Chief Complaint Patient presents with  Follow-up Other fracture of left lower leg  Medication Evaluation  
  patient would like to discuss d/c some of her medications Vitals:  
 19 1110 BP: 130/84 Pulse: 60 Resp: 18 Temp: 97.9 °F (36.6 °C) TempSrc: Oral  
SpO2: 99% Weight: 166 lb (75.3 kg) Height: 5' 7\" (1.702 m) PainSc:   4 PainLoc: Head Hearing/Vision: No exam data present Learning Assessment:  
 
Learning Assessment 2/3/2015 PRIMARY LEARNER Patient HIGHEST LEVEL OF EDUCATION - PRIMARY LEARNER  4 YEARS OF COLLEGE  
BARRIERS PRIMARY LEARNER NONE  
CO-LEARNER CAREGIVER No  
PRIMARY LANGUAGE ENGLISH  
LEARNER PREFERENCE PRIMARY LISTENING  
ANSWERED BY self RELATIONSHIP SELF Depression Screening: PHQ over the last two weeks 2017 Little interest or pleasure in doing things Not at all Feeling down, depressed, irritable, or hopeless Not at all Total Score PHQ 2 0 Fall Risk Assessment:  
 
Fall Risk Assessment, last 12 mths 2019 Able to walk? Yes Fall in past 12 months? Yes Fall with injury? Yes  
Number of falls in past 12 months 4 Fall Risk Score 5 Abuse Screening:  
 
Abuse Screening Questionnaire 2017 Do you ever feel afraid of your partner? Arnie Graves Are you in a relationship with someone who physically or mentally threatens you? Arnie Graves Is it safe for you to go home? Brenda Hogeu Coordination of Care Questionaire: 1. Have you been to the ER, urgent care clinic since your last visit? Hospitalized since your last visit? NO 
 
2. Have you seen or consulted any other health care providers outside of the [address] [place] [address] since your last visit? Include any pap smears or colon screening. YES Orthopedics Advanced Directive: 1. Do you have an Advanced Directive? YES 
 
2. Would you like information on Advanced Directives?  NO

## 2019-01-16 NOTE — PROGRESS NOTES
Assessment/Plan: *Diagnoses and all orders for this visit: 1. Essential hypertension 2. Hypothyroidism due to acquired atrophy of thyroid 
-     TSH 3RD GENERATION; Future -     T4, FREE; Future 3. Left wrist pain 4. Drowsiness Will check her thyroid today since she has been feeling tired. Physical in June 2019. BW prior. The plan was discussed with the patient. The patient verbalized understanding and is in agreement with the plan. All medication potential side effects were discussed with the patient. 
 
------------------------------------------------------------------------------------------------------------------- Antonio Salgado is a 68 y.o. female and presents with Follow-up (Other fracture of left lower leg) and Medication Evaluation (patient would like to discuss d/c some of her medications) Subjective: She is still healing and recovering from her LT leg and LT wrist fractures. She is still in physical therapy and coming along slowly. Sees Dr. Shelia Ledezma She has developed a numbness in the LT hand, along the path that the ulnar nerve supplies. She has an appt to see Dr. Donte Escamilla. Migraines: have been acting up again. She has been to see  [facility], she ordered an MRI brain which she will be doing this Sat. She has also been feeling quite tired lately. Was concerned about the psych meds that Dr. Ariella Pedro put her on because she only started feeling this way after that. Is on Cymbalta and Wellbutrin. ROS: 
Review of Systems - Negative except as above The problem list was updated as a part of today's visit. Patient Active Problem List  
Diagnosis Code  Hyperlipidemia E78.5  Connective tissue disease ([facility] [address] M35.9  Depression F32.9  
 HH (hiatus hernia) K44.9  Gastroesophageal reflux disease K21.9  Rosacea L71.9  
 Capsulitis M77.9  Degeneration of intervertebral disc of cervical region M50.30  Allergy T78.40XA  Colitis, ischemic (HCC) K55.9  Anxiety associated with depression F41.8  Raynaud's syndrome I73.00  Hypothyroidism E03.9  Pulmonary nodules R91.8  Renal cyst N28.1  Hypovitaminosis D E55.9  Biliary dyskinesia K82.8  Osteoarthritis M19.90  Dyslipidemia E78.5  Classical migraine with intractable migraine G43.119  
 Rotator cuff tear arthropathy M75.100, M12.819  Lower gastrointestinal hemorrhage K92.2  Cervico-occipital neuralgia M54.81  
 History of cervical spinal surgery Z98.890  
 History of knee surgery Z98.890  
 Diarrhea R19.7  Osteopenia M85.80  Generalized arthritis M19.90  
 Essential hypertension I10 The PSH, FH were reviewed. SH: Social History Tobacco Use  Smoking status: Never Smoker  Smokeless tobacco: Never Used Substance Use Topics  Alcohol use: No  
 Drug use: No  
 
 
Medications/Allergies: 
Current Outpatient Medications on File Prior to Visit Medication Sig Dispense Refill  DEXILANT 60 mg CpDB capsule (delayed release) TAKE 1 CAPSULE DAILY 90 Cap 1  verapamil ER (VERELAN) 360 mg ER capsule TAKE 1 CAPSULE DAILY 90 Cap 2  
 atorvastatin (LIPITOR) 20 mg tablet TAKE 1 TABLET (20 MG) BY MOUTH ONCE DAILY 90 Tab 2  
 levothyroxine (SYNTHROID) 50 mcg tablet Take 1 Tab by mouth Daily (before breakfast). 90 Tab 2  verapamil ER (VERELAN) 360 mg ER capsule Take 1 Cap by mouth daily. 90 Cap 2  
 naratriptan (AMERGE) 2.5 mg tab Take 2.5 mg by mouth once as needed.  divalproex ER (DEPAKOTE ER) 500 mg ER tablet   1  
 DULoxetine (CYMBALTA) 30 mg capsule Take 30 mg by mouth two (2) times a day.  desonide (TRIDESILON) 0.05 % topical lotion Apply  to affected area two (2) times a day.  buPROPion (WELLBUTRIN) 100 mg tablet Take 100 mg by mouth two (2) times a day.  calcium-cholecalciferol, d3, (CALCIUM 600 + D) 600-125 mg-unit tab Take  by mouth two (2) times a day.  coenzyme q10 (CHEW Q) 100 mg chew Take 200 mg by mouth daily.  multivitamin (ONE A DAY) tablet Take 1 Tab by mouth daily.  magnesium Tab Take  by mouth two (2) times a day. No current facility-administered medications on file prior to visit. Allergies Allergen Reactions  Pcn [Penicillins] Rash Was tested is no longer allergic Health Maintenance:  
Health Maintenance Topic Date Due  Shingrix Vaccine Age 50> (1 of 2) 11/27/1995  GLAUCOMA SCREENING Q2Y  07/07/2012  MEDICARE YEARLY EXAM  06/14/2019  BREAST CANCER SCRN MAMMOGRAM  08/08/2020  COLONOSCOPY  07/07/2022  DTaP/Tdap/Td series (3 - Td) 04/22/2026  Hepatitis C Screening  Completed  Bone Densitometry (Dexa) Screening  Completed  Pneumococcal 65+ Low/Medium Risk  Completed  Influenza Age 5 to Adult  Completed Objective: 
Visit Vitals /84 (BP 1 Location: Left arm, BP Patient Position: Sitting) Pulse 60 Temp 97.9 °F (36.6 °C) (Oral) Resp 18 Ht 5' 7\" (1.702 m) Wt 166 lb (75.3 kg) LMP  (LMP Unknown) SpO2 99% BMI 26.00 kg/m² Nurses notes and VS reviewed. Physical Examination: General appearance - alert, well appearing, and in no distress Chest - clear to auscultation, no wheezes, rales or rhonchi, symmetric air entry Heart - normal rate, regular rhythm, normal S1, S2, no murmurs, rubs, clicks or gallops Labwork and Ancillary Studies: CBC w/Diff Lab Results Component Value Date/Time WBC 4.3 (L) 06/15/2018 01:23 PM  
 HGB 14.5 06/15/2018 01:23 PM  
 PLATELET 646 95/65/4279 01:23 PM  
  
 
 Basic Metabolic Profile Lab Results Component Value Date/Time  Sodium 141 06/15/2018 01:23 PM  
 Potassium 3.6 06/15/2018 01:23 PM  
 Chloride 99 (L) 06/15/2018 01:23 PM  
 CO2 34 (H) 06/15/2018 01:23 PM  
 Anion gap 8 06/15/2018 01:23 PM  
 Glucose 99 06/15/2018 01:23 PM  
 BUN 19 (H) 06/15/2018 01:23 PM  
 Creatinine 0.97 06/15/2018 01:23 PM  
 BUN/Creatinine ratio 20 06/15/2018 01:23 PM  
 GFR est AA >60 06/15/2018 01:23 PM  
 GFR est non-AA 56 (L) 06/15/2018 01:23 PM  
 Calcium 9.2 06/15/2018 01:23 PM  
  
  
LFT Lab Results Component Value Date/Time ALT (SGPT) 17 06/15/2018 01:23 PM  
 AST (SGOT) 17 06/15/2018 01:23 PM  
 Alk. phosphatase 67 06/15/2018 01:23 PM  
 Bilirubin, direct 0.2 06/15/2018 01:23 PM  
 Bilirubin, total 0.5 06/15/2018 01:23 PM  
 
 
 
Cholesterol Lab Results Component Value Date/Time  Cholesterol, total 144 06/15/2018 01:23 PM  
 HDL Cholesterol 61 (H) 06/15/2018 01:23 PM  
 LDL, calculated 64.4 06/15/2018 01:23 PM  
 Triglyceride 93 06/15/2018 01:23 PM  
 CHOL/HDL Ratio 2.4 06/15/2018 01:23 PM

## 2019-01-16 NOTE — PATIENT INSTRUCTIONS
Hypothyroidism: Care Instructions Your Care Instructions You have hypothyroidism, which means that your body is not making enough thyroid hormone. This hormone helps your body use energy. If your thyroid level is low, you may feel tired, be constipated, have an increase in your blood pressure, or have dry skin or memory problems. You may also get cold easily, even when it is warm. Women with low thyroid levels may have heavy menstrual periods. A blood test to find your thyroid-stimulating hormone (TSH) level is used to check for hypothyroidism. A high TSH level may mean that you have low thyroid. When your body is not making enough thyroid hormone, TSH levels rise in an effort to make the body produce more. The treatment for hypothyroidism is to take thyroid hormone pills. You should start to feel better in 1 to 2 weeks. But it can take several months to see changes in the TSH level. You will need regular visits with your doctor to make sure you have the right dose of medicine. Most people need treatment for the rest of their lives. You will need to see your doctor regularly to have blood tests and to make sure you are doing well. Follow-up care is a key part of your treatment and safety. Be sure to make and go to all appointments, and call your doctor if you are having problems. It's also a good idea to know your test results and keep a list of the medicines you take. How can you care for yourself at home? · Take your thyroid hormone medicine exactly as prescribed. Call your doctor if you think you are having a problem with your medicine. Most people do not have side effects if they take the right amount of medicine regularly. ? Take the medicine 30 minutes before breakfast, and do not take it with calcium, vitamins, or iron. ? Do not take extra doses of your thyroid medicine. It will not help you get better any faster, and it may cause side effects. ? If you forget to take a dose, do NOT take a double dose of medicine. Take your usual dose the next day. · Tell your doctor about all prescription, herbal, or over-the-counter products you take. · Take care of yourself. Eat a healthy diet, get enough sleep, and get regular exercise. When should you call for help? Call 911 anytime you think you may need emergency care. For example, call if: 
  · You passed out (lost consciousness).  
  · You have severe trouble breathing.  
  · You have a very slow heartbeat (less than 60 beats a minute).  
  · You have a low body temperature (95°F or below).  
 Call your doctor now or seek immediate medical care if: 
  · You feel tired, sluggish, or weak.  
  · You have trouble remembering things or concentrating.  
  · You do not begin to feel better 2 weeks after starting your medicine.  
 Watch closely for changes in your health, and be sure to contact your doctor if you have any problems. Where can you learn more? Go to http://gilda-michael.info/. Enter X979 in the search box to learn more about \"Hypothyroidism: Care Instructions. \" Current as of: March 15, 2018 Content Version: 11.8 © 5161-3172 [employer]. Care instructions adapted under license by [employer] (which disclaims liability or warranty for this information). If you have questions about a medical condition or this instruction, always ask your healthcare professional. [address] any warranty or liability for your use of this information.

## 2019-02-15 RX ORDER — LEVOTHYROXINE SODIUM 50 UG/1
TABLET ORAL
Qty: 90 TAB | Refills: 2 | Status: SHIPPED | OUTPATIENT
Start: 2019-02-15 | End: 2019-11-12 | Stop reason: SDUPTHER

## 2019-03-19 ENCOUNTER — OFFICE VISIT (OUTPATIENT)
Dept: FAMILY MEDICINE CLINIC | Age: 74
End: 2019-03-19

## 2019-03-19 VITALS
SYSTOLIC BLOOD PRESSURE: 140 MMHG | TEMPERATURE: 97.8 F | RESPIRATION RATE: 16 BRPM | BODY MASS INDEX: 25.58 KG/M2 | OXYGEN SATURATION: 99 % | WEIGHT: 163 LBS | HEART RATE: 78 BPM | HEIGHT: 67 IN | DIASTOLIC BLOOD PRESSURE: 80 MMHG

## 2019-03-19 DIAGNOSIS — I10 ESSENTIAL HYPERTENSION: ICD-10-CM

## 2019-03-19 DIAGNOSIS — G56.22 ENTRAPMENT OF LEFT ULNAR NERVE: ICD-10-CM

## 2019-03-19 DIAGNOSIS — G43.119 CLASSICAL MIGRAINE WITH INTRACTABLE MIGRAINE: Primary | ICD-10-CM

## 2019-03-19 RX ORDER — TRETINOIN 0.25 MG/G
CREAM TOPICAL
COMMUNITY
End: 2019-11-21

## 2019-03-19 NOTE — PROGRESS NOTES
Charisse Mckoy is a 68 y.o. female (: 1945) presenting to address:    Chief Complaint   Patient presents with    Hypertension     discuss medications     Thyroid Problem       Vitals:    19 0938   BP: (!) 140/100   Pulse: 78   Resp: 16   Temp: 97.8 °F (36.6 °C)   TempSrc: Oral   SpO2: 99%   Weight: 163 lb (73.9 kg)   Height: 5' 7\" (1.702 m)   PainSc:   5   PainLoc: Leg       Hearing/Vision:   No exam data present    Learning Assessment:     Learning Assessment 2/3/2015   PRIMARY LEARNER Patient   HIGHEST LEVEL OF EDUCATION - PRIMARY LEARNER  4 YEARS OF COLLEGE   BARRIERS PRIMARY LEARNER NONE   CO-LEARNER CAREGIVER No   PRIMARY LANGUAGE ENGLISH   LEARNER PREFERENCE PRIMARY LISTENING   ANSWERED BY self   RELATIONSHIP SELF     Depression Screening:     3 most recent PHQ Screens 2017   Little interest or pleasure in doing things Not at all   Feeling down, depressed, irritable, or hopeless Not at all   Total Score PHQ 2 0     Fall Risk Assessment:     Fall Risk Assessment, last 12 mths 2019   Able to walk? Yes   Fall in past 12 months? Yes   Fall with injury? Yes   Number of falls in past 12 months 4   Fall Risk Score 5     Abuse Screening:     Abuse Screening Questionnaire 2017   Do you ever feel afraid of your partner? N   Are you in a relationship with someone who physically or mentally threatens you? N   Is it safe for you to go home? Y     Coordination of Care Questionaire:   1. Have you been to the ER, urgent care clinic since your last visit? Hospitalized since your last visit? NO    2. Have you seen or consulted any other health care providers outside of the [address] [place] [address] since your last visit? Include any pap smears or colon screening. Genna Calderón for hands , Jarrod Desir for ankle and Clayborne Boast . Advanced Directive:   1. Do you have an Advanced Directive? Yes     2. Would you like information on Advanced Directives?  NO

## 2019-03-19 NOTE — PROGRESS NOTES
Assessment/Plan:    *Diagnoses and all orders for this visit:    1. Classical migraine with intractable migraine    2. Entrapment of left ulnar nerve    3. Essential hypertension        Will f/u at her next routine visit. The plan was discussed with the patient. The patient verbalized understanding and is in agreement with the plan. All medication potential side effects were discussed with the patient.    -------------------------------------------------------------------------------------------------------------------        Josiane Berrios is a 68 y.o. female and presents with Hypertension (discuss medications ) and Thyroid Problem         Subjective:  Pt here for f/u. She made this appt today b/c she wanted to review her medications with us. Pt has just started a new medication for prevention of migraines, Emgality. It is injectable, does it at home, will be once a month and started today. HTN: borderline, stable. Has been to see Dr. Dewayne Klein (who happened to see her for her foot / ankle issue) and she as having numbness in the LT arm. He referred her back to [facility] office for a NCV and it confirmed she has LT ulnar nerve compression. She has now been referred to her hand specialist, Dr. Chanda Seaman. ROS:  Review of Systems - Negative except as above        The problem list was updated as a part of today's visit.   Patient Active Problem List   Diagnosis Code    Hyperlipidemia E78.5    Connective tissue disease ([facility] [address] M35.9    Depression F32.9    HH (hiatus hernia) K44.9    Gastroesophageal reflux disease K21.9    Rosacea L71.9    Capsulitis M77.9    Degeneration of intervertebral disc of cervical region M50.30    Allergy T78.40XA    Colitis, ischemic ([facility] [address] K55.9    Anxiety associated with depression F41.8    Raynaud's syndrome I73.00    Hypothyroidism E03.9    Pulmonary nodules R91.8    Renal cyst N28.1    Hypovitaminosis D E55.9    Biliary dyskinesia K82.8    Osteoarthritis M19.90    Dyslipidemia E78.5    Classical migraine with intractable migraine G43.119    Rotator cuff tear arthropathy M75.100, M12.819    Lower gastrointestinal hemorrhage K92.2    Cervico-occipital neuralgia M54.81    History of cervical spinal surgery Z98.890    History of knee surgery Z98.890    Diarrhea R19.7    Osteopenia M85.80    Generalized arthritis M19.90    Essential hypertension I10       The PSH, FH were reviewed. SH:  Social History     Tobacco Use    Smoking status: Never Smoker    Smokeless tobacco: Never Used   Substance Use Topics    Alcohol use: No    Drug use: No       Medications/Allergies:  Current Outpatient Medications on File Prior to Visit   Medication Sig Dispense Refill    galcanezumab-gnlm (EMGALITY SYRINGE) 120 mg/mL syrg by SubCUTAneous route.  tretinoin (RETIN-A) 0.025 % topical cream Apply  to affected area nightly.  SYNTHROID 50 mcg tablet TAKE 1 TABLET DAILY BEFORE BREAKFAST 90 Tab 2    DEXILANT 60 mg CpDB capsule (delayed release) TAKE 1 CAPSULE DAILY 90 Cap 1    atorvastatin (LIPITOR) 20 mg tablet TAKE 1 TABLET (20 MG) BY MOUTH ONCE DAILY 90 Tab 2    verapamil ER (VERELAN) 360 mg ER capsule Take 1 Cap by mouth daily. 90 Cap 2    naratriptan (AMERGE) 2.5 mg tab Take 2.5 mg by mouth once as needed.  divalproex ER (DEPAKOTE ER) 500 mg ER tablet 500 mg daily. 1    calcium-cholecalciferol, d3, (CALCIUM 600 + D) 600-125 mg-unit tab Take  by mouth two (2) times a day.  coenzyme q10 (CHEW Q) 100 mg chew Take 200 mg by mouth daily.  multivitamin (ONE A DAY) tablet Take 1 Tab by mouth daily.  magnesium Tab Take  by mouth two (2) times a day. No current facility-administered medications on file prior to visit.          Allergies   Allergen Reactions    Pcn [Penicillins] Rash     Was tested is no longer allergic         Health Maintenance:   Health Maintenance   Topic Date Due    Shingrix Vaccine Age 49> (1 of 2) 11/27/1995  GLAUCOMA SCREENING Q2Y  07/07/2012    MEDICARE YEARLY EXAM  06/14/2019    BREAST CANCER SCRN MAMMOGRAM  08/08/2020    COLONOSCOPY  07/07/2022    DTaP/Tdap/Td series (3 - Td) 04/22/2026    Hepatitis C Screening  Completed    Bone Densitometry (Dexa) Screening  Completed    Pneumococcal 65+ Low/Medium Risk  Completed    Influenza Age 5 to Adult  Completed       Objective:  Visit Vitals  /80 (BP 1 Location: Left arm, BP Patient Position: Sitting)   Pulse 78   Temp 97.8 °F (36.6 °C) (Oral)   Resp 16   Ht 5' 7\" (1.702 m)   Wt 163 lb (73.9 kg)   LMP  (LMP Unknown)   SpO2 99%   BMI 25.53 kg/m²          Nurses notes and VS reviewed. Physical Examination: General appearance - alert, well appearing, and in no distress  Chest - clear to auscultation, no wheezes, rales or rhonchi, symmetric air entry  Heart - normal rate and regular rhythm, systolic murmur        Labwork and Ancillary Studies:    CBC w/Diff  Lab Results   Component Value Date/Time    WBC 4.3 (L) 06/15/2018 01:23 PM    HGB 14.5 06/15/2018 01:23 PM    PLATELET 897 06/39/0775 01:23 PM         Basic Metabolic Profile  Lab Results   Component Value Date/Time    Sodium 141 06/15/2018 01:23 PM    Potassium 3.6 06/15/2018 01:23 PM    Chloride 99 (L) 06/15/2018 01:23 PM    CO2 34 (H) 06/15/2018 01:23 PM    Anion gap 8 06/15/2018 01:23 PM    Glucose 99 06/15/2018 01:23 PM    BUN 19 (H) 06/15/2018 01:23 PM    Creatinine 0.97 06/15/2018 01:23 PM    BUN/Creatinine ratio 20 06/15/2018 01:23 PM    GFR est AA >60 06/15/2018 01:23 PM    GFR est non-AA 56 (L) 06/15/2018 01:23 PM    Calcium 9.2 06/15/2018 01:23 PM         LFT  Lab Results   Component Value Date/Time    ALT (SGPT) 17 06/15/2018 01:23 PM    AST (SGOT) 17 06/15/2018 01:23 PM    Alk.  phosphatase 67 06/15/2018 01:23 PM    Bilirubin, direct 0.2 06/15/2018 01:23 PM    Bilirubin, total 0.5 06/15/2018 01:23 PM         Cholesterol  Lab Results   Component Value Date/Time    Cholesterol, total 144 06/15/2018 01:23 PM    HDL Cholesterol 61 (H) 06/15/2018 01:23 PM    LDL, calculated 64.4 06/15/2018 01:23 PM    Triglyceride 93 06/15/2018 01:23 PM    CHOL/HDL Ratio 2.4 06/15/2018 01:23 PM

## 2019-04-30 ENCOUNTER — OFFICE VISIT (OUTPATIENT)
Dept: FAMILY MEDICINE CLINIC | Age: 74
End: 2019-04-30

## 2019-04-30 VITALS
HEART RATE: 69 BPM | TEMPERATURE: 98.8 F | SYSTOLIC BLOOD PRESSURE: 134 MMHG | HEIGHT: 67 IN | BODY MASS INDEX: 26.21 KG/M2 | RESPIRATION RATE: 16 BRPM | OXYGEN SATURATION: 99 % | DIASTOLIC BLOOD PRESSURE: 98 MMHG | WEIGHT: 167 LBS

## 2019-04-30 DIAGNOSIS — M25.50 MULTIPLE JOINT PAIN: Primary | ICD-10-CM

## 2019-04-30 DIAGNOSIS — I87.2 VENOUS INSUFFICIENCY: ICD-10-CM

## 2019-04-30 DIAGNOSIS — L23.7 POISON IVY: ICD-10-CM

## 2019-04-30 DIAGNOSIS — M79.89 LEFT LEG SWELLING: ICD-10-CM

## 2019-04-30 RX ORDER — METHYLPREDNISOLONE 4 MG/1
TABLET ORAL
Qty: 1 DOSE PACK | Refills: 0 | Status: SHIPPED | OUTPATIENT
Start: 2019-04-30 | End: 2019-07-15

## 2019-04-30 NOTE — PROGRESS NOTES
Assessment/Plan:    *Diagnoses and all orders for this visit:    1. Multiple joint pain  -     PATRICK COMPREHENSIVE PANEL; Future  -     SED RATE (ESR); Future  -     RA + CCP ABS; Future  -     C REACTIVE PROTEIN, QT; Future  -     CBC WITH AUTOMATED DIFF; Future  -     METABOLIC PANEL, BASIC; Future  -     HEPATIC FUNCTION PANEL; Future    2. Left leg swelling  -     DUPLEX LOWER EXT VENOUS BILAT; Future    3. Venous insufficiency  -     DUPLEX LOWER EXT VENOUS BILAT; Future    4. Poison ivy  -     methylPREDNISolone (MEDROL DOSEPACK) 4 mg tablet; As directed on package        Will hold off on giving her any diuretics for the leg swelling until results are back. The plan was discussed with the patient. The patient verbalized understanding and is in agreement with the plan. All medication potential side effects were discussed with the patient.    -------------------------------------------------------------------------------------------------------------------        Odilia Keys is a 68 y.o. female and presents with Wrist Pain (left wrist pain accident related . also having issue with right started ); Foot Pain (also having swelling in foot started in last few weeks ); and Other (Measles titer )         Subjective:  Pt here for a rash on her RT foot, very itchy, was in the yard and came into some poison ivy. Always has a reaction to this. Pt has been having pain in her knees with swelling in the LT foot. Was in to see the Ortho PA. They suggested physical therapy but pt is not keen on this since she just came out of months of therapy. She is going to wait and see her Ortho, Dr. Kusum Zabala. Has been having an increasing issue with multiple joints hurting, she has known arthritis but not typically like this. ROS:  Review of Systems - Negative except as above        The problem list was updated as a part of today's visit.   Patient Active Problem List   Diagnosis Code    Hyperlipidemia E78.5    Connective tissue disease (HCC) M35.9    Depression F32.9    HH (hiatus hernia) K44.9    Gastroesophageal reflux disease K21.9    Rosacea L71.9    Capsulitis M77.9    Degeneration of intervertebral disc of cervical region M50.30    Allergy T78.40XA    Colitis, ischemic (HCC) K55.9    Anxiety associated with depression F41.8    Raynaud's syndrome I73.00    Hypothyroidism E03.9    Pulmonary nodules R91.8    Renal cyst N28.1    Hypovitaminosis D E55.9    Biliary dyskinesia K82.8    Osteoarthritis M19.90    Dyslipidemia E78.5    Classical migraine with intractable migraine G43.119    Rotator cuff tear arthropathy M75.100, M12.819    Lower gastrointestinal hemorrhage K92.2    Cervico-occipital neuralgia M54.81    History of cervical spinal surgery Z98.890    History of knee surgery Z98.890    Diarrhea R19.7    Osteopenia M85.80    Generalized arthritis M19.90    Essential hypertension I10       The PSH, FH were reviewed. SH:  Social History     Tobacco Use    Smoking status: Never Smoker    Smokeless tobacco: Never Used   Substance Use Topics    Alcohol use: No    Drug use: No       Medications/Allergies:  Current Outpatient Medications on File Prior to Visit   Medication Sig Dispense Refill    galcanezumab-gnlm (EMGALITY SYRINGE) 120 mg/mL syrg by SubCUTAneous route.  SYNTHROID 50 mcg tablet TAKE 1 TABLET DAILY BEFORE BREAKFAST 90 Tab 2    DEXILANT 60 mg CpDB capsule (delayed release) TAKE 1 CAPSULE DAILY 90 Cap 1    atorvastatin (LIPITOR) 20 mg tablet TAKE 1 TABLET (20 MG) BY MOUTH ONCE DAILY 90 Tab 2    verapamil ER (VERELAN) 360 mg ER capsule Take 1 Cap by mouth daily. 90 Cap 2    naratriptan (AMERGE) 2.5 mg tab Take 2.5 mg by mouth once as needed.  calcium-cholecalciferol, d3, (CALCIUM 600 + D) 600-125 mg-unit tab Take  by mouth two (2) times a day.  coenzyme q10 (CHEW Q) 100 mg chew Take 200 mg by mouth daily.       multivitamin (ONE A DAY) tablet Take 1 Tab by mouth daily.  magnesium Tab Take  by mouth two (2) times a day.  tretinoin (RETIN-A) 0.025 % topical cream Apply  to affected area nightly.  divalproex ER (DEPAKOTE ER) 500 mg ER tablet 500 mg daily. 1     No current facility-administered medications on file prior to visit. Allergies   Allergen Reactions    Pcn [Penicillins] Rash     Was tested is no longer allergic         Health Maintenance:   Health Maintenance   Topic Date Due    Shingrix Vaccine Age 49> (1 of 2) 11/27/1995    GLAUCOMA SCREENING Q2Y  07/07/2012    Pneumococcal 65+ years (2 of 2 - PCV13) 02/24/2015    MEDICARE YEARLY EXAM  06/14/2019    Influenza Age 5 to Adult  08/01/2019    BREAST CANCER SCRN MAMMOGRAM  08/08/2020    COLONOSCOPY  07/07/2022    DTaP/Tdap/Td series (4 - Td) 04/22/2026    Hepatitis C Screening  Completed    Bone Densitometry (Dexa) Screening  Completed       Objective:  Visit Vitals  BP (!) 134/98 (BP 1 Location: Left arm, BP Patient Position: Sitting)   Pulse 69   Temp 98.8 °F (37.1 °C) (Oral)   Resp 16   Ht 5' 7\" (1.702 m)   Wt 167 lb (75.8 kg)   LMP  (LMP Unknown)   SpO2 99%   BMI 26.16 kg/m²          Nurses notes and VS reviewed. Physical Examination: General appearance - alert, well appearing, and in no distress  Musculoskeletal - swelling and pain in her hands, wrists, knees.   Extremities - 1-2+ edema of LT leg, no calf swelling, no calf tenderness or tightness        Labwork and Ancillary Studies:    CBC w/Diff  Lab Results   Component Value Date/Time    WBC 4.3 (L) 06/15/2018 01:23 PM    HGB 14.5 06/15/2018 01:23 PM    PLATELET 224 51/64/4075 01:23 PM         Basic Metabolic Profile  Lab Results   Component Value Date/Time    Sodium 141 06/15/2018 01:23 PM    Potassium 3.6 06/15/2018 01:23 PM    Chloride 99 (L) 06/15/2018 01:23 PM    CO2 34 (H) 06/15/2018 01:23 PM    Anion gap 8 06/15/2018 01:23 PM    Glucose 99 06/15/2018 01:23 PM    BUN 19 (H) 06/15/2018 01:23 PM    Creatinine 0.97 06/15/2018 01:23 PM    BUN/Creatinine ratio 20 06/15/2018 01:23 PM    GFR est AA >60 06/15/2018 01:23 PM    GFR est non-AA 56 (L) 06/15/2018 01:23 PM    Calcium 9.2 06/15/2018 01:23 PM         LFT  Lab Results   Component Value Date/Time    ALT (SGPT) 17 06/15/2018 01:23 PM    AST (SGOT) 17 06/15/2018 01:23 PM    Alk.  phosphatase 67 06/15/2018 01:23 PM    Bilirubin, direct 0.2 06/15/2018 01:23 PM    Bilirubin, total 0.5 06/15/2018 01:23 PM         Cholesterol  Lab Results   Component Value Date/Time    Cholesterol, total 144 06/15/2018 01:23 PM    HDL Cholesterol 61 (H) 06/15/2018 01:23 PM    LDL, calculated 64.4 06/15/2018 01:23 PM    Triglyceride 93 06/15/2018 01:23 PM    CHOL/HDL Ratio 2.4 06/15/2018 01:23 PM

## 2019-04-30 NOTE — PATIENT INSTRUCTIONS
Joint Pain: Care Instructions  Your Care Instructions    Many people have small aches and pains from overuse or injury to muscles and joints. Joint injuries often happen during sports or recreation, work tasks, or projects around the home. An overuse injury can happen when you put too much stress on a joint or when you do an activity that stresses the joint over and over, such as using the computer or rowing a boat. You can take action at home to help your muscles and joints get better. You should feel better in 1 to 2 weeks, but it can take 3 months or more to heal completely. Follow-up care is a key part of your treatment and safety. Be sure to make and go to all appointments, and call your doctor if you are having problems. It's also a good idea to know your test results and keep a list of the medicines you take. How can you care for yourself at home? · Do not put weight on the injured joint for at least a day or two. · For the first day or two after an injury, do not take hot showers or baths, and do not use hot packs. The heat could make swelling worse. · Put ice or a cold pack on the sore joint for 10 to 20 minutes at a time. Try to do this every 1 to 2 hours for the next 3 days (when you are awake) or until the swelling goes down. Put a thin cloth between the ice and your skin. · Wrap the injury in an elastic bandage. Do not wrap it too tightly because this can cause more swelling. · Prop up the sore joint on a pillow when you ice it or anytime you sit or lie down during the next 3 days. Try to keep it above the level of your heart. This will help reduce swelling. · Take an over-the-counter pain medicine, such as acetaminophen (Tylenol), ibuprofen (Advil, Motrin), or naproxen (Aleve). Read and follow all instructions on the label. · After 1 or 2 days of rest, begin moving the joint gently.  While the joint is still healing, you can begin to exercise using activities that do not strain or hurt the painful joint. When should you call for help? Call your doctor now or seek immediate medical care if:    · You have signs of infection, such as:  ? Increased pain, swelling, warmth, and redness. ? Red streaks leading from the joint. ? A fever.    Watch closely for changes in your health, and be sure to contact your doctor if:    · Your movement or symptoms are not getting better after 1 to 2 weeks of home treatment. Where can you learn more? Go to http://gilda-michael.info/. Enter P205 in the search box to learn more about \"Joint Pain: Care Instructions. \"  Current as of: September 20, 2018  Content Version: 11.9  © 8943-6880 [employer] Care instructions adapted under license by [employer] (which disclaims liability or warranty for this information). If you have questions about a medical condition or this instruction, always ask your healthcare professional. [address] any warranty or liability for your use of this information.

## 2019-04-30 NOTE — PROGRESS NOTES
Yohana Cast is a 68 y.o. female (: 1945) presenting to address:    Chief Complaint   Patient presents with    Wrist Pain     left wrist pain accident related . also having issue with right started     Foot Pain     also having swelling in foot started in last few weeks     Other     Measles titer        Vitals:    19 1418   BP: (!) 134/98   Pulse: 69   Resp: 16   Temp: 98.8 °F (37.1 °C)   TempSrc: Oral   SpO2: 99%   Weight: 167 lb (75.8 kg)   Height: 5' 7\" (1.702 m)   PainSc:   5   PainLoc: Foot       Hearing/Vision:   No exam data present    Learning Assessment:     Learning Assessment 2/3/2015   PRIMARY LEARNER Patient   HIGHEST LEVEL OF EDUCATION - PRIMARY LEARNER  4 YEARS OF COLLEGE   BARRIERS PRIMARY LEARNER NONE   CO-LEARNER CAREGIVER No   PRIMARY LANGUAGE ENGLISH   LEARNER PREFERENCE PRIMARY LISTENING   ANSWERED BY self   RELATIONSHIP SELF     Depression Screening:     3 most recent PHQ Screens 2017   Little interest or pleasure in doing things Not at all   Feeling down, depressed, irritable, or hopeless Not at all   Total Score PHQ 2 0     Fall Risk Assessment:     Fall Risk Assessment, last 12 mths 2019   Able to walk? Yes   Fall in past 12 months? Yes   Fall with injury? Yes   Number of falls in past 12 months 4   Fall Risk Score 5     Abuse Screening:     Abuse Screening Questionnaire 2017   Do you ever feel afraid of your partner? N   Are you in a relationship with someone who physically or mentally threatens you? N   Is it safe for you to go home? Y     Coordination of Care Questionaire:   1. Have you been to the ER, urgent care clinic since your last visit? Hospitalized since your last visit? NO    2. Have you seen or consulted any other health care providers outside of the [address] [place] [address] since your last visit? Include any pap smears or colon screening. NO    Advanced Directive:   1. Do you have an Advanced Directive? Yes     2.  Would you like information on Advanced Directives?  NO

## 2019-05-01 ENCOUNTER — HOSPITAL ENCOUNTER (OUTPATIENT)
Dept: LAB | Age: 74
Discharge: HOME | End: 2019-05-01
Payer: MEDICARE

## 2019-05-01 DIAGNOSIS — M25.50 MULTIPLE JOINT PAIN: ICD-10-CM

## 2019-05-01 LAB
ALBUMIN SERPL-MCNC: 4 G/DL (ref 3.4–5)
ALBUMIN/GLOB SERPL: 1.5 {RATIO} (ref 0.8–1.7)
ALP SERPL-CCNC: 88 U/L (ref 45–117)
ALT SERPL-CCNC: 18 U/L (ref 13–56)
ANION GAP SERPL CALC-SCNC: 7 MMOL/L (ref 3–18)
AST SERPL-CCNC: 14 U/L (ref 15–37)
BASOPHILS # BLD: 0 K/UL (ref 0–0.1)
BASOPHILS NFR BLD: 1 % (ref 0–2)
BILIRUB DIRECT SERPL-MCNC: 0.1 MG/DL (ref 0–0.2)
BILIRUB SERPL-MCNC: 0.5 MG/DL (ref 0.2–1)
BUN SERPL-MCNC: 30 MG/DL (ref 7–18)
BUN/CREAT SERPL: 34 (ref 12–20)
CALCIUM SERPL-MCNC: 9 MG/DL (ref 8.5–10.1)
CHLORIDE SERPL-SCNC: 106 MMOL/L (ref 100–108)
CO2 SERPL-SCNC: 28 MMOL/L (ref 21–32)
CREAT SERPL-MCNC: 0.88 MG/DL (ref 0.6–1.3)
CRP SERPL-MCNC: <0.3 MG/DL (ref 0–0.3)
DIFFERENTIAL METHOD BLD: ABNORMAL
EOSINOPHIL # BLD: 0.2 K/UL (ref 0–0.4)
EOSINOPHIL NFR BLD: 5 % (ref 0–5)
ERYTHROCYTE [DISTWIDTH] IN BLOOD BY AUTOMATED COUNT: 15 % (ref 11.6–14.5)
ERYTHROCYTE [SEDIMENTATION RATE] IN BLOOD: 2 MM/HR (ref 0–30)
GLOBULIN SER CALC-MCNC: 2.7 G/DL (ref 2–4)
GLUCOSE SERPL-MCNC: 88 MG/DL (ref 74–99)
HCT VFR BLD AUTO: 45.9 % (ref 35–45)
HGB BLD-MCNC: 14.3 G/DL (ref 12–16)
LYMPHOCYTES # BLD: 1.6 K/UL (ref 0.9–3.6)
LYMPHOCYTES NFR BLD: 29 % (ref 21–52)
MCH RBC QN AUTO: 30.6 PG (ref 24–34)
MCHC RBC AUTO-ENTMCNC: 31.2 G/DL (ref 31–37)
MCV RBC AUTO: 98.1 FL (ref 74–97)
MONOCYTES # BLD: 0.6 K/UL (ref 0.05–1.2)
MONOCYTES NFR BLD: 11 % (ref 3–10)
NEUTS SEG # BLD: 3 K/UL (ref 1.8–8)
NEUTS SEG NFR BLD: 54 % (ref 40–73)
PLATELET # BLD AUTO: 238 K/UL (ref 135–420)
PMV BLD AUTO: 11.4 FL (ref 9.2–11.8)
POTASSIUM SERPL-SCNC: 4.4 MMOL/L (ref 3.5–5.5)
PROT SERPL-MCNC: 6.7 G/DL (ref 6.4–8.2)
RBC # BLD AUTO: 4.68 M/UL (ref 4.2–5.3)
SODIUM SERPL-SCNC: 141 MMOL/L (ref 136–145)
WBC # BLD AUTO: 5.4 K/UL (ref 4.6–13.2)

## 2019-05-01 PROCEDURE — 80076 HEPATIC FUNCTION PANEL: CPT

## 2019-05-01 PROCEDURE — 36415 COLL VENOUS BLD VENIPUNCTURE: CPT

## 2019-05-01 PROCEDURE — 86225 DNA ANTIBODY NATIVE: CPT

## 2019-05-01 PROCEDURE — 85652 RBC SED RATE AUTOMATED: CPT

## 2019-05-01 PROCEDURE — 80048 BASIC METABOLIC PNL TOTAL CA: CPT

## 2019-05-01 PROCEDURE — 86140 C-REACTIVE PROTEIN: CPT

## 2019-05-01 PROCEDURE — 85025 COMPLETE CBC W/AUTO DIFF WBC: CPT

## 2019-05-01 PROCEDURE — 86200 CCP ANTIBODY: CPT

## 2019-05-03 LAB
CCP IGA+IGG SERPL IA-ACNC: 215 UNITS (ref 0–19)
CENTROMERE B AB SER-ACNC: <0.2 AI (ref 0–0.9)
CHROMATIN AB SERPL-ACNC: <0.2 AI (ref 0–0.9)
DSDNA AB SER-ACNC: <1 IU/ML (ref 0–9)
ENA JO1 AB SER-ACNC: <0.2 AI (ref 0–0.9)
ENA RNP AB SER-ACNC: 0.2 AI (ref 0–0.9)
ENA SCL70 AB SER-ACNC: <0.2 AI (ref 0–0.9)
ENA SM AB SER-ACNC: <0.2 AI (ref 0–0.9)
ENA SS-A AB SER-ACNC: <0.2 AI (ref 0–0.9)
ENA SS-B AB SER-ACNC: <0.2 AI (ref 0–0.9)
RHEUMATOID FACT SERPL-ACNC: <10 IU/ML (ref 0–13.9)
SEE BELOW, 164869: NORMAL

## 2019-05-07 ENCOUNTER — TELEPHONE (OUTPATIENT)
Dept: FAMILY MEDICINE CLINIC | Age: 74
End: 2019-05-07

## 2019-05-07 RX ORDER — FUROSEMIDE 20 MG/1
20 TABLET ORAL
Qty: 30 TAB | Refills: 1 | Status: SHIPPED | OUTPATIENT
Start: 2019-05-07 | End: 2019-05-29 | Stop reason: SDUPTHER

## 2019-05-07 NOTE — TELEPHONE ENCOUNTER
Please call pt. Notify her that she has evidence of venous reflux in both her legs but is greater in the LT compared tho the RT. This would explain the swelling in her foot. This typically is managed conservatively with diuretics, compression socks, leg elevation when possible. There is even a lymphedema clinic I refer people to when their condition is more severe. I would start with a basic diuretic on as needed basis, if she wishes. Should we send in a Rx? She does NOT have any blood clots in the legs.

## 2019-05-07 NOTE — TELEPHONE ENCOUNTER
Patient notified of results and verbalized understanding. Patient stated that she would like to start a diuretic.

## 2019-05-08 ENCOUNTER — OFFICE VISIT (OUTPATIENT)
Dept: FAMILY MEDICINE CLINIC | Age: 74
End: 2019-05-08

## 2019-05-08 VITALS
BODY MASS INDEX: 26.21 KG/M2 | WEIGHT: 167 LBS | SYSTOLIC BLOOD PRESSURE: 120 MMHG | OXYGEN SATURATION: 100 % | DIASTOLIC BLOOD PRESSURE: 80 MMHG | RESPIRATION RATE: 16 BRPM | HEART RATE: 71 BPM | TEMPERATURE: 98.6 F | HEIGHT: 67 IN

## 2019-05-08 DIAGNOSIS — I87.2 VENOUS REFLUX: Primary | ICD-10-CM

## 2019-05-08 NOTE — PROGRESS NOTES
Yohana Cast is a 68 y.o. female (: 1945) presenting to address:    Chief Complaint   Patient presents with    Foot Swelling     follow up would like to go over venous study        Vitals:    19 0801   BP: 120/80   Pulse: 71   Resp: 16   Temp: 98.6 °F (37 °C)   TempSrc: Oral   SpO2: 100%   Weight: 167 lb (75.8 kg)   Height: 5' 7\" (1.702 m)   PainSc:   4   PainLoc: Leg       Hearing/Vision:   No exam data present    Learning Assessment:     Learning Assessment 2/3/2015   PRIMARY LEARNER Patient   HIGHEST LEVEL OF EDUCATION - PRIMARY LEARNER  4 YEARS OF COLLEGE   BARRIERS PRIMARY LEARNER NONE   CO-LEARNER CAREGIVER No   PRIMARY LANGUAGE ENGLISH   LEARNER PREFERENCE PRIMARY LISTENING   ANSWERED BY self   RELATIONSHIP SELF     Depression Screening:     3 most recent PHQ Screens 2017   Little interest or pleasure in doing things Not at all   Feeling down, depressed, irritable, or hopeless Not at all   Total Score PHQ 2 0     Fall Risk Assessment:     Fall Risk Assessment, last 12 mths 2019   Able to walk? Yes   Fall in past 12 months? Yes   Fall with injury? Yes   Number of falls in past 12 months 4   Fall Risk Score 5     Abuse Screening:     Abuse Screening Questionnaire 2017   Do you ever feel afraid of your partner? N   Are you in a relationship with someone who physically or mentally threatens you? N   Is it safe for you to go home? Y     Coordination of Care Questionaire:   1. Have you been to the ER, urgent care clinic since your last visit? Hospitalized since your last visit? NO    2. Have you seen or consulted any other health care providers outside of the [address] [place] [address] since your last visit? Include any pap smears or colon screening. YES foot and ankle Dr     Advanced Directive:   1. Do you have an Advanced Directive? yes    2. Would you like information on Advanced Directives?  NO

## 2019-05-08 NOTE — PATIENT INSTRUCTIONS
Learning About Venous Insufficiency  What is it? Venous insufficiency is a problem with the flow of blood from the veins of the legs back to the heart. It's also called chronic venous insufficiency or chronic venous stasis. Your veins bring blood back to the heart after it flows through your body. Veins have valves that keep the blood moving in one direction--toward the heart. But with venous insufficiency, the veins of the legs might not work as they should. This can allow blood to leak backward. Fluid can pool in the legs. This can lead to problems that include varicose veins. What causes it? Venous insufficiency is sometimes caused by deep vein thrombosis and high blood pressure inside leg veins. You are more likely to have venous insufficiency if you:  · Are older. · Are female. · Are overweight. · Don't get enough physical activity. · Smoke. · Have a family history of varicose veins. What are the symptoms? Symptoms of venous insufficiency affect the legs. They may include:  · Swelling, often in the ankles. · A rash. · Varicose veins. · Itching. · Cramping. · Skin sores (ulcers). · Aching or a feeling of heaviness. · Changes in skin color. How is it diagnosed? Your doctor can diagnose venous insufficiency by examining your legs and by using a type of ultrasound test (duplex Doppler) to find out how well blood is flowing in your legs. How is it treated? To reduce swelling and relieve pain caused by venous insufficiency, you can wear compression stockings. They are tighter at the ankles than at the top of the legs. They also can help venous skin ulcers heal. But there are different types of stockings, and they need to fit right. So your doctor will recommend what you need. You also can try to:  · Get more exercise, especially walking. It can increase blood flow. · Avoid standing still or sitting for a long time, which can make the fluid pool in your legs.   · Try not to sit with your legs crossed at the knee. · Keep your legs raised above your heart when you're lying down. This reduces swelling. If these treatments don't work, you may need medicine or a procedure to help relieve symptoms. Procedures might be done to close the vein, to remove the vein, or to improve blood flow. Follow-up care is a key part of your treatment and safety. Be sure to make and go to all appointments, and call your doctor if you are having problems. It's also a good idea to know your test results and keep a list of the medicines you take. Current as of: September 26, 2018  Content Version: 11.9  © 1184-3897 [employer]. Care instructions adapted under license by [employer] (which disclaims liability or warranty for this information). If you have questions about a medical condition or this instruction, always ask your healthcare professional. [address] any warranty or liability for your use of this information.

## 2019-05-08 NOTE — PROGRESS NOTES
Assessment/Plan:    *Diagnoses and all orders for this visit:    1. Venous reflux        Lasix 20 mg called in for PRN use. Told her that in summer, swelling might be a little worse. Leg elevation, low sodium diet, compression hose were all suggested. Condition is too mild to refer her to lymph edema clinic. Will consider if situation worsens. The plan was discussed with the patient. The patient verbalized understanding and is in agreement with the plan. All medication potential side effects were discussed with the patient.    -------------------------------------------------------------------------------------------------------------------        Cari Silvestre is a 68 y.o. female and presents with Foot Swelling (follow up would like to go over venous study )         Subjective:  Pt came today to discuss results of her venous doppler. She has been having issues with swelling of the LT leg. Has been back to her Ortho who did surgery on her foot and he also said having swelling is part of her healing process. Results were given to her. Bilateral: A bilateral lower extremity venous insufficiency duplex study was performed to assess the deep and superficial veins.  The bilateral common femoral, femoral, popliteal, great saphenous and small saphenous veins were imaged with duplex ultrasound.  B mode, color and spectral Doppler images are obtained to assess for thrombus and venous reflux. The patient was scanned supine and standing. Conclusions: No evidence of deep venous thrombosis in the bilateral common femoral, femoral or popliteal veins. No deep venous reflux is identified in the right lower extremity veins. Venous valvular reflux >1000 ms in the left common femoral vein of the deep venous system.       Isolated right great saphenous vein reflux >500 ms at the level of the saphenous femoral junction and proximal thigh. No right small saphenous vein reflux >500 ms.  Competent  in the right lower extremity with reflux <350 ms.  No evidence of left lower extremity varicose veins > 6.0 mm. Isolated left great saphenous vein reflux >500 ms at the level of the distal calf. No left small saphenous vein reflux > 500 ms. No left small saphenous vein reflux > 500 ms. No left small saphenous vein reflux > 500 ms.  Competent  in the left lower extremity with reflux <350 ms. No evidence of left lower extremity varicose veins > 6.0 mm. ROS:  Review of Systems - Negative except as above        The problem list was updated as a part of today's visit. Patient Active Problem List   Diagnosis Code    Hyperlipidemia E78.5    Connective tissue disease ([address] M35.9    Depression F32.9    HH (hiatus hernia) K44.9    Gastroesophageal reflux disease K21.9    Rosacea L71.9    Capsulitis M77.9    Degeneration of intervertebral disc of cervical region M50.30    Allergy T78.40XA    Colitis, ischemic ([facility] [address] [address] K55.9    Anxiety associated with depression F41.8    Raynaud's syndrome I73.00    Hypothyroidism E03.9    Pulmonary nodules R91.8    Renal cyst N28.1    Hypovitaminosis D E55.9    Biliary dyskinesia K82.8    Osteoarthritis M19.90    Dyslipidemia E78.5    Classical migraine with intractable migraine G43.119    Rotator cuff tear arthropathy M75.100, M12.819    Lower gastrointestinal hemorrhage K92.2    Cervico-occipital neuralgia M54.81    History of cervical spinal surgery Z98.890    History of knee surgery Z98.890    Diarrhea R19.7    Osteopenia M85.80    Generalized arthritis M19.90    Essential hypertension I10       The PSH, FH were reviewed.         SH:  Social History     Tobacco Use    Smoking status: Never Smoker    Smokeless tobacco: Never Used   Substance Use Topics    Alcohol use: No    Drug use: No       Medications/Allergies:  Current Outpatient Medications on File Prior to Visit   Medication Sig Dispense Refill    furosemide (LASIX) 20 mg tablet Take 1 Tab by mouth daily as needed (edema). 30 Tab 1    galcanezumab-gnlm (EMGALITY SYRINGE) 120 mg/mL syrg by SubCUTAneous route.  tretinoin (RETIN-A) 0.025 % topical cream Apply  to affected area nightly.  SYNTHROID 50 mcg tablet TAKE 1 TABLET DAILY BEFORE BREAKFAST 90 Tab 2    DEXILANT 60 mg CpDB capsule (delayed release) TAKE 1 CAPSULE DAILY 90 Cap 1    atorvastatin (LIPITOR) 20 mg tablet TAKE 1 TABLET (20 MG) BY MOUTH ONCE DAILY 90 Tab 2    verapamil ER (VERELAN) 360 mg ER capsule Take 1 Cap by mouth daily. 90 Cap 2    naratriptan (AMERGE) 2.5 mg tab Take 2.5 mg by mouth once as needed.  divalproex ER (DEPAKOTE ER) 500 mg ER tablet 500 mg daily. 1    calcium-cholecalciferol, d3, (CALCIUM 600 + D) 600-125 mg-unit tab Take  by mouth two (2) times a day.  coenzyme q10 (CHEW Q) 100 mg chew Take 200 mg by mouth daily.  multivitamin (ONE A DAY) tablet Take 1 Tab by mouth daily.  magnesium Tab Take  by mouth two (2) times a day.  methylPREDNISolone (MEDROL DOSEPACK) 4 mg tablet As directed on package 1 Dose Pack 0     No current facility-administered medications on file prior to visit.          Allergies   Allergen Reactions    Pcn [Penicillins] Rash     Was tested is no longer allergic         Health Maintenance:   Health Maintenance   Topic Date Due    Shingrix Vaccine Age 49> (1 of 2) 11/27/1995    GLAUCOMA SCREENING Q2Y  07/07/2012    Pneumococcal 65+ years (2 of 2 - PCV13) 02/24/2015    MEDICARE YEARLY EXAM  06/14/2019    Influenza Age 5 to Adult  08/01/2019    BREAST CANCER SCRN MAMMOGRAM  08/08/2020    COLONOSCOPY  07/07/2022    DTaP/Tdap/Td series (4 - Td) 04/22/2026    Hepatitis C Screening  Completed    Bone Densitometry (Dexa) Screening  Completed       Objective:  Visit Vitals  /80 (BP 1 Location: Left arm, BP Patient Position: Sitting)   Pulse 71   Temp 98.6 °F (37 °C) (Oral)   Resp 16   Ht 5' 7\" (1.702 m)   Wt 167 lb (75.8 kg)   LMP  (LMP Unknown)   SpO2 100%   BMI 26.16 kg/m²          Nurses notes and VS reviewed. Physical Examination: General appearance - alert, well appearing, and in no distress          Labwork and Ancillary Studies:    CBC w/Diff  Lab Results   Component Value Date/Time    WBC 5.4 05/01/2019 11:49 AM    HGB 14.3 05/01/2019 11:49 AM    PLATELET 740 67/96/7920 11:49 AM         Basic Metabolic Profile  Lab Results   Component Value Date/Time    Sodium 141 05/01/2019 11:49 AM    Potassium 4.4 05/01/2019 11:49 AM    Chloride 106 05/01/2019 11:49 AM    CO2 28 05/01/2019 11:49 AM    Anion gap 7 05/01/2019 11:49 AM    Glucose 88 05/01/2019 11:49 AM    BUN 30 (H) 05/01/2019 11:49 AM    Creatinine 0.88 05/01/2019 11:49 AM    BUN/Creatinine ratio 34 (H) 05/01/2019 11:49 AM    GFR est AA >60 05/01/2019 11:49 AM    GFR est non-AA >60 05/01/2019 11:49 AM    Calcium 9.0 05/01/2019 11:49 AM         LFT  Lab Results   Component Value Date/Time    ALT (SGPT) 18 05/01/2019 11:49 AM    AST (SGOT) 14 (L) 05/01/2019 11:49 AM    Alk.  phosphatase 88 05/01/2019 11:49 AM    Bilirubin, direct 0.1 05/01/2019 11:49 AM    Bilirubin, total 0.5 05/01/2019 11:49 AM         Cholesterol  Lab Results   Component Value Date/Time    Cholesterol, total 144 06/15/2018 01:23 PM    HDL Cholesterol 61 (H) 06/15/2018 01:23 PM    LDL, calculated 64.4 06/15/2018 01:23 PM    Triglyceride 93 06/15/2018 01:23 PM    CHOL/HDL Ratio 2.4 06/15/2018 01:23 PM

## 2019-05-09 ENCOUNTER — TELEPHONE ANTICOAG (OUTPATIENT)
Dept: FAMILY MEDICINE CLINIC | Age: 74
End: 2019-05-09

## 2019-05-09 DIAGNOSIS — M79.89 LEFT LEG SWELLING: ICD-10-CM

## 2019-05-09 DIAGNOSIS — I87.2 VENOUS INSUFFICIENCY: ICD-10-CM

## 2019-05-09 LAB
INR, EXTERNAL: 2.4
PT, EXTERNAL: NORMAL

## 2019-05-09 NOTE — PROGRESS NOTES
Call pt. I reviewed all her labs. She needs to increase her water intake. The CCP antibodies, which have been tested before, are still elevated. She was seeing Dr. Dwight Becerra at the time. Was there anything else she discussed with her and does mrs. Sharlene Moya still see her? She should return to her for a f/u since she is having worsening joint pain.

## 2019-05-29 ENCOUNTER — OFFICE VISIT (OUTPATIENT)
Dept: FAMILY MEDICINE CLINIC | Age: 74
End: 2019-05-29

## 2019-05-29 VITALS
RESPIRATION RATE: 16 BRPM | HEIGHT: 67 IN | DIASTOLIC BLOOD PRESSURE: 70 MMHG | HEART RATE: 72 BPM | BODY MASS INDEX: 26.21 KG/M2 | TEMPERATURE: 98.4 F | SYSTOLIC BLOOD PRESSURE: 118 MMHG | WEIGHT: 167 LBS | OXYGEN SATURATION: 98 %

## 2019-05-29 DIAGNOSIS — M25.512 ACUTE PAIN OF LEFT SHOULDER: Primary | ICD-10-CM

## 2019-05-29 DIAGNOSIS — M25.512 ACUTE PAIN OF LEFT SHOULDER: ICD-10-CM

## 2019-05-29 DIAGNOSIS — M25.522 LEFT ELBOW PAIN: ICD-10-CM

## 2019-05-29 DIAGNOSIS — I87.2 VENOUS INSUFFICIENCY: Primary | ICD-10-CM

## 2019-05-29 RX ORDER — FUROSEMIDE 20 MG/1
20 TABLET ORAL
Qty: 90 TAB | Refills: 1 | Status: SHIPPED | OUTPATIENT
Start: 2019-05-29 | End: 2019-12-04 | Stop reason: SDUPTHER

## 2019-05-29 NOTE — PROGRESS NOTES
oJse Enriquez is a 68 y.o. female (: 1945) presenting to address:    Chief Complaint   Patient presents with    Leg Pain     left      Shoulder Pain     left side fell this weekend .  Foot Swelling     left        Vitals:    19 0942   BP: 118/70   Pulse: 72   Resp: 16   Temp: 98.4 °F (36.9 °C)   TempSrc: Oral   SpO2: 98%   Weight: 167 lb (75.8 kg)   Height: 5' 7\" (1.702 m)   PainSc:   8   PainLoc: Shoulder       Hearing/Vision:   No exam data present    Learning Assessment:     Learning Assessment 2/3/2015   PRIMARY LEARNER Patient   HIGHEST LEVEL OF EDUCATION - PRIMARY LEARNER  4 YEARS OF COLLEGE   BARRIERS PRIMARY LEARNER NONE   CO-LEARNER CAREGIVER No   PRIMARY LANGUAGE ENGLISH   LEARNER PREFERENCE PRIMARY LISTENING   ANSWERED BY self   RELATIONSHIP SELF     Depression Screening:     3 most recent PHQ Screens 2017   Little interest or pleasure in doing things Not at all   Feeling down, depressed, irritable, or hopeless Not at all   Total Score PHQ 2 0     Fall Risk Assessment:     Fall Risk Assessment, last 12 mths 2019   Able to walk? Yes   Fall in past 12 months? Yes   Fall with injury? Yes   Number of falls in past 12 months 1   Fall Risk Score 2     Abuse Screening:     Abuse Screening Questionnaire 2017   Do you ever feel afraid of your partner? N   Are you in a relationship with someone who physically or mentally threatens you? N   Is it safe for you to go home? Y     Coordination of Care Questionaire:   1. Have you been to the ER, urgent care clinic since your last visit? Hospitalized since your last visit? NO    2. Have you seen or consulted any other health care providers outside of the [address] [place] [address] since your last visit? Include any pap smears or colon screening. Yes [facility]     Advanced Directive:   1. Do you have an Advanced Directive? NO    2. Would you like information on Advanced Directives?  NO

## 2019-05-29 NOTE — PATIENT INSTRUCTIONS
Shoulder Pain: Care Instructions Your Care Instructions You can hurt your shoulder by using it too much during an activity, such as fishing or baseball. It can also happen as part of the everyday wear and tear of getting older. Shoulder injuries can be slow to heal, but your shoulder should get better with time. Your doctor may recommend a sling to rest your shoulder. If you have injured your shoulder, you may need testing and treatment. Follow-up care is a key part of your treatment and safety. Be sure to make and go to all appointments, and call your doctor if you are having problems. It's also a good idea to know your test results and keep a list of the medicines you take. How can you care for yourself at home? · Take pain medicines exactly as directed. ? If the doctor gave you a prescription medicine for pain, take it as prescribed. ? If you are not taking a prescription pain medicine, ask your doctor if you can take an over-the-counter medicine. ? Do not take two or more pain medicines at the same time unless the doctor told you to. Many pain medicines contain acetaminophen, which is Tylenol. Too much acetaminophen (Tylenol) can be harmful. · If your doctor recommends that you wear a sling, use it as directed. Do not take it off before your doctor tells you to. · Put ice or a cold pack on the sore area for 10 to 20 minutes at a time. Put a thin cloth between the ice and your skin. · If there is no swelling, you can put moist heat, a heating pad, or a warm cloth on your shoulder. Some doctors suggest alternating between hot and cold. · Rest your shoulder for a few days. If your doctor recommends it, you can then begin gentle exercise of the shoulder, but do not lift anything heavy. When should you call for help? Call 911 anytime you think you may need emergency care. For example, call if: 
  · You have chest pain or pressure. This may occur with: ? Sweating. ? Shortness of breath. ? Nausea or vomiting. ? Pain that spreads from the chest to the neck, jaw, or one or both shoulders or arms. ? Dizziness or lightheadedness. ? A fast or uneven pulse. After calling 911, chew 1 adult-strength aspirin. Wait for an ambulance. Do not try to drive yourself.  
  · Your arm or hand is cool or pale or changes color.  
 Call your doctor now or seek immediate medical care if: 
  · You have signs of infection, such as: 
? Increased pain, swelling, warmth, or redness in your shoulder. ? Red streaks leading from a place on your shoulder. ? Pus draining from an area of your shoulder. ? Swollen lymph nodes in your neck, armpits, or groin. ? A fever.  
 Watch closely for changes in your health, and be sure to contact your doctor if: 
  · You cannot use your shoulder.  
  · Your shoulder does not get better as expected. Where can you learn more? Go to http://gilda-michael.info/. Enter A804 in the search box to learn more about \"Shoulder Pain: Care Instructions. \" Current as of: September 20, 2018 Content Version: 11.9 © 9066-4130 "[employer] Care instructions adapted under license by [employer] (which disclaims liability or warranty for this information). If you have questions about a medical condition or this instruction, always ask your healthcare professional. [address] any warranty or liability for your use of this information.

## 2019-05-29 NOTE — PROGRESS NOTES
Assessment/Plan:    *Diagnoses and all orders for this visit:    1. Venous insufficiency  -     furosemide (LASIX) 20 mg tablet; Take 1 Tab by mouth daily as needed (edema). 2. Acute pain of left shoulder  -     XR SHOULDER LT AP/LAT MIN 2 V; Future    3. Left elbow pain  -     XR ELBOW LT MIN 3 V; Future        The plan was discussed with the patient. The patient verbalized understanding and is in agreement with the plan. All medication potential side effects were discussed with the patient.    -------------------------------------------------------------------------------------------------------------------        Virginia Perdue is a 68 y.o. female and presents with Leg Pain (left  ); Shoulder Pain (left side fell this weekend . ); and Foot Swelling (left )         Subjective:  Pt returns because of the pain and swelling in her LT leg. We did an US and informed her that she had venous insufficiency with reflux in both legs, LT>RT. We advised her to start using Lasix (which she never picked up and forgot about) and to use compression socks (which she has been using at night rather than during the day. We never told her to use them that way. [place], hurt her LT shoulder and elbow. She ahs been told for years by Dr. Adenike Murphy that she needs a LT shoulder replacement but she has been avoiding this. Has reduced ROM in shoulder. ROS:  Review of Systems - Negative except as above        The problem list was updated as a part of today's visit.   Patient Active Problem List   Diagnosis Code    Hyperlipidemia E78.5    Connective tissue disease ([facility] [address] M35.9    Depression F32.9    HH (hiatus hernia) K44.9    Gastroesophageal reflux disease K21.9    Rosacea L71.9    Capsulitis M77.9    Degeneration of intervertebral disc of cervical region M50.30    Allergy T78.40XA    Colitis, ischemic ([address] K55.9    Anxiety associated with depression F41.8    Raynaud's syndrome I73.00    Hypothyroidism E03.9    Pulmonary nodules R91.8    Renal cyst N28.1    Hypovitaminosis D E55.9    Biliary dyskinesia K82.8    Osteoarthritis M19.90    Dyslipidemia E78.5    Classical migraine with intractable migraine G43.119    Rotator cuff tear arthropathy M75.100, M12.819    Lower gastrointestinal hemorrhage K92.2    Cervico-occipital neuralgia M54.81    History of cervical spinal surgery Z98.890    History of knee surgery Z98.890    Diarrhea R19.7    Osteopenia M85.80    Generalized arthritis M19.90    Essential hypertension I10       The PSH, FH were reviewed. SH:  Social History     Tobacco Use    Smoking status: Never Smoker    Smokeless tobacco: Never Used   Substance Use Topics    Alcohol use: No    Drug use: No       Medications/Allergies:  Current Outpatient Medications on File Prior to Visit   Medication Sig Dispense Refill    galcanezumab-gnlm (EMGALITY SYRINGE) 120 mg/mL syrg by SubCUTAneous route.  SYNTHROID 50 mcg tablet TAKE 1 TABLET DAILY BEFORE BREAKFAST 90 Tab 2    DEXILANT 60 mg CpDB capsule (delayed release) TAKE 1 CAPSULE DAILY 90 Cap 1    atorvastatin (LIPITOR) 20 mg tablet TAKE 1 TABLET (20 MG) BY MOUTH ONCE DAILY 90 Tab 2    verapamil ER (VERELAN) 360 mg ER capsule Take 1 Cap by mouth daily. 90 Cap 2    naratriptan (AMERGE) 2.5 mg tab Take 2.5 mg by mouth once as needed.  divalproex ER (DEPAKOTE ER) 500 mg ER tablet 500 mg daily. 1    calcium-cholecalciferol, d3, (CALCIUM 600 + D) 600-125 mg-unit tab Take  by mouth two (2) times a day.  coenzyme q10 (CHEW Q) 100 mg chew Take 200 mg by mouth daily.  multivitamin (ONE A DAY) tablet Take 1 Tab by mouth daily.  magnesium Tab Take  by mouth two (2) times a day.  methylPREDNISolone (MEDROL DOSEPACK) 4 mg tablet As directed on package 1 Dose Pack 0    tretinoin (RETIN-A) 0.025 % topical cream Apply  to affected area nightly.        No current facility-administered medications on file prior to visit. Allergies   Allergen Reactions    Pcn [Penicillins] Rash     Was tested is no longer allergic         Health Maintenance:   Health Maintenance   Topic Date Due    Shingrix Vaccine Age 49> (1 of 2) 11/27/1995    GLAUCOMA SCREENING Q2Y  07/07/2012    Pneumococcal 65+ years (2 of 2 - PCV13) 02/24/2015    MEDICARE YEARLY EXAM  06/14/2019    Influenza Age 5 to Adult  08/01/2019    BREAST CANCER SCRN MAMMOGRAM  08/08/2020    COLONOSCOPY  07/07/2022    DTaP/Tdap/Td series (4 - Td) 04/22/2026    Hepatitis C Screening  Completed    Bone Densitometry (Dexa) Screening  Completed       Objective:  Visit Vitals  /70 (BP 1 Location: Left arm, BP Patient Position: Sitting)   Pulse 72   Temp 98.4 °F (36.9 °C) (Oral)   Resp 16   Ht 5' 7\" (1.702 m)   Wt 167 lb (75.8 kg)   LMP  (LMP Unknown)   SpO2 98%   BMI 26.16 kg/m²          Nurses notes and VS reviewed.       Physical Examination: General appearance - alert, well appearing, and in no distress  Musculoskeletal - abnormal exam of left shoulder: reduced ROM, pain with movement;  LT elbow good ROM but has pain  Extremities - pedal edema 1-2 +          Labwork and Ancillary Studies:    CBC w/Diff  Lab Results   Component Value Date/Time    WBC 5.4 05/01/2019 11:49 AM    HGB 14.3 05/01/2019 11:49 AM    PLATELET 499 78/79/9519 11:49 AM         Basic Metabolic Profile  Lab Results   Component Value Date/Time    Sodium 141 05/01/2019 11:49 AM    Potassium 4.4 05/01/2019 11:49 AM    Chloride 106 05/01/2019 11:49 AM    CO2 28 05/01/2019 11:49 AM    Anion gap 7 05/01/2019 11:49 AM    Glucose 88 05/01/2019 11:49 AM    BUN 30 (H) 05/01/2019 11:49 AM    Creatinine 0.88 05/01/2019 11:49 AM    BUN/Creatinine ratio 34 (H) 05/01/2019 11:49 AM    GFR est AA >60 05/01/2019 11:49 AM    GFR est non-AA >60 05/01/2019 11:49 AM    Calcium 9.0 05/01/2019 11:49 AM         LFT  Lab Results   Component Value Date/Time    ALT (SGPT) 18 05/01/2019 11:49 AM    AST (SGOT) 14 (L) 05/01/2019 11:49 AM    Alk.  phosphatase 88 05/01/2019 11:49 AM    Bilirubin, direct 0.1 05/01/2019 11:49 AM    Bilirubin, total 0.5 05/01/2019 11:49 AM         Cholesterol  Lab Results   Component Value Date/Time    Cholesterol, total 144 06/15/2018 01:23 PM    HDL Cholesterol 61 (H) 06/15/2018 01:23 PM    LDL, calculated 64.4 06/15/2018 01:23 PM    Triglyceride 93 06/15/2018 01:23 PM    CHOL/HDL Ratio 2.4 06/15/2018 01:23 PM

## 2019-06-19 ENCOUNTER — HOSPITAL ENCOUNTER (OUTPATIENT)
Dept: LAB | Age: 74
Discharge: HOME | End: 2019-06-19
Payer: MEDICARE

## 2019-06-19 ENCOUNTER — OFFICE VISIT (OUTPATIENT)
Dept: FAMILY MEDICINE CLINIC | Age: 74
End: 2019-06-19

## 2019-06-19 VITALS
TEMPERATURE: 97.8 F | BODY MASS INDEX: 26.37 KG/M2 | HEART RATE: 60 BPM | SYSTOLIC BLOOD PRESSURE: 140 MMHG | OXYGEN SATURATION: 99 % | RESPIRATION RATE: 16 BRPM | DIASTOLIC BLOOD PRESSURE: 90 MMHG | WEIGHT: 168 LBS | HEIGHT: 67 IN

## 2019-06-19 DIAGNOSIS — E55.9 HYPOVITAMINOSIS D: ICD-10-CM

## 2019-06-19 DIAGNOSIS — I10 ESSENTIAL HYPERTENSION: Primary | ICD-10-CM

## 2019-06-19 DIAGNOSIS — I10 ESSENTIAL HYPERTENSION: ICD-10-CM

## 2019-06-19 DIAGNOSIS — E78.00 PURE HYPERCHOLESTEROLEMIA: ICD-10-CM

## 2019-06-19 DIAGNOSIS — M19.90 GENERALIZED ARTHRITIS: ICD-10-CM

## 2019-06-19 DIAGNOSIS — I87.2 VENOUS INSUFFICIENCY: ICD-10-CM

## 2019-06-19 DIAGNOSIS — Z00.00 MEDICARE ANNUAL WELLNESS VISIT, SUBSEQUENT: ICD-10-CM

## 2019-06-19 LAB
CHOLEST SERPL-MCNC: 140 MG/DL
HDLC SERPL-MCNC: 65 MG/DL (ref 40–60)
HDLC SERPL: 2.2 {RATIO} (ref 0–5)
LDLC SERPL CALC-MCNC: 62.8 MG/DL (ref 0–100)
LIPID PROFILE,FLP: ABNORMAL
T4 FREE SERPL-MCNC: 1 NG/DL (ref 0.7–1.5)
TRIGL SERPL-MCNC: 61 MG/DL (ref ?–150)
TSH SERPL DL<=0.05 MIU/L-ACNC: 2.89 UIU/ML (ref 0.36–3.74)
VLDLC SERPL CALC-MCNC: 12.2 MG/DL

## 2019-06-19 PROCEDURE — 84443 ASSAY THYROID STIM HORMONE: CPT

## 2019-06-19 PROCEDURE — 84439 ASSAY OF FREE THYROXINE: CPT

## 2019-06-19 PROCEDURE — 36415 COLL VENOUS BLD VENIPUNCTURE: CPT

## 2019-06-19 PROCEDURE — 80061 LIPID PANEL: CPT

## 2019-06-19 PROCEDURE — 82306 VITAMIN D 25 HYDROXY: CPT

## 2019-06-19 RX ORDER — DEXLANSOPRAZOLE 60 MG/1
CAPSULE, DELAYED RELEASE ORAL
Qty: 90 CAP | Refills: 2 | Status: SHIPPED | OUTPATIENT
Start: 2019-06-19 | End: 2019-06-20 | Stop reason: SDUPTHER

## 2019-06-19 RX ORDER — ATORVASTATIN CALCIUM 20 MG/1
TABLET, FILM COATED ORAL
Qty: 90 TAB | Refills: 2 | Status: SHIPPED | OUTPATIENT
Start: 2019-06-19 | End: 2019-07-15

## 2019-06-19 RX ORDER — VERAPAMIL HYDROCHLORIDE 360 MG/1
360 CAPSULE, DELAYED RELEASE PELLETS ORAL DAILY
Qty: 90 CAP | Refills: 2 | Status: SHIPPED | OUTPATIENT
Start: 2019-06-19 | End: 2020-06-11

## 2019-06-19 NOTE — PROGRESS NOTES
Maryam Poole is a 68 y.o. female (: 1945) presenting to address:    Chief Complaint   Patient presents with   Macario Moya Annual Wellness Visit    Hip Pain     Left hip     Arthritis    Foot Pain     left foot pain and swelling        Vitals:    19 1304   BP: 140/90   Pulse: 60   Resp: 16   Temp: 97.8 °F (36.6 °C)   TempSrc: Oral   SpO2: 99%   Weight: 168 lb (76.2 kg)   Height: 5' 7\" (1.702 m)   PainSc:   6   PainLoc: Shoulder       Hearing/Vision:      Visual Acuity Screening    Right eye Left eye Both eyes   Without correction:      With correction: 20/20 20/20 20/20       Learning Assessment:     Learning Assessment 2/3/2015   PRIMARY LEARNER Patient   HIGHEST LEVEL OF EDUCATION - PRIMARY LEARNER  4 YEARS OF COLLEGE   BARRIERS PRIMARY LEARNER NONE   CO-LEARNER CAREGIVER No   PRIMARY LANGUAGE ENGLISH   LEARNER PREFERENCE PRIMARY LISTENING   ANSWERED BY self   RELATIONSHIP SELF     Depression Screening:     3 most recent PHQ Screens 2017   Little interest or pleasure in doing things Not at all   Feeling down, depressed, irritable, or hopeless Not at all   Total Score PHQ 2 0     Fall Risk Assessment:     Fall Risk Assessment, last 12 mths 2019   Able to walk? Yes   Fall in past 12 months? Yes   Fall with injury? Yes   Number of falls in past 12 months 1   Fall Risk Score 2     Abuse Screening:     Abuse Screening Questionnaire 2017   Do you ever feel afraid of your partner? N   Are you in a relationship with someone who physically or mentally threatens you? N   Is it safe for you to go home? Y     Coordination of Care Questionaire:   1. Have you been to the ER, urgent care clinic since your last visit? Hospitalized since your last visit? NO    2. Have you seen or consulted any other health care providers outside of the [address] [place] [address] since your last visit? Include any pap smears or colon screening. NO    Advanced Directive:   1. Do you have an Advanced Directive? YES    2.  Would you like information on Advanced Directives?  NO

## 2019-06-19 NOTE — PATIENT INSTRUCTIONS
Medicare Wellness Visit, Female The best way to live healthy is to have a lifestyle where you eat a well-balanced diet, exercise regularly, limit alcohol use, and quit all forms of tobacco/nicotine, if applicable. Regular preventive services are another way to keep healthy. Preventive services (vaccines, screening tests, monitoring & exams) can help personalize your care plan, which helps you manage your own care. Screening tests can find health problems at the earliest stages, when they are easiest to treat. [facility] follows the current, evidence-based guidelines published by the [facility] ([facility]) when recommending preventive services for our patients. Because we follow these guidelines, sometimes recommendations change over time as research supports it. (For example, mammograms used to be recommended annually. Even though Medicare will still pay for an annual mammogram, the newer guidelines recommend a mammogram every two years for women of average risk.) Of course, you and your doctor may decide to screen more often for some diseases, based on your risk and your health status. Preventive services for you include: - Medicare offers their members a free annual wellness visit, which is time for you and your primary care provider to discuss and plan for your preventive service needs. Take advantage of this benefit every year! 
-All adults over the age of 72 should receive the recommended pneumonia vaccines. Current USPSTF guidelines recommend a series of two vaccines for the best pneumonia protection.  
-All adults should have a flu vaccine yearly and a tetanus vaccine every 10 years. All adults age 61 and older should receive a shingles vaccine once in their lifetime.   
-A bone mass density test is recommended when a woman turns 65 to screen for osteoporosis. This test is only recommended one time, as a screening. Some providers will use this same test as a disease monitoring tool if you already have osteoporosis. -All adults age 38-68 who are overweight should have a diabetes screening test once every three years.  
-Other screening tests and preventive services for persons with diabetes include: an eye exam to screen for diabetic retinopathy, a kidney function test, a foot exam, and stricter control over your cholesterol.  
-Cardiovascular screening for adults with routine risk involves an electrocardiogram (ECG) at intervals determined by your doctor.  
-Colorectal cancer screenings should be done for adults age 54-65 with no increased risk factors for colorectal cancer. There are a number of acceptable methods of screening for this type of cancer. Each test has its own benefits and drawbacks. Discuss with your doctor what is most appropriate for you during your annual wellness visit. The different tests include: colonoscopy (considered the best screening method), a fecal occult blood test, a fecal DNA test, and sigmoidoscopy. -Breast cancer screenings are recommended every other year for women of normal risk, age 54-69. 
-Cervical cancer screenings for women over age 72 are only recommended with certain risk factors.  
-All adults born between [facility] should be screened once for Hepatitis C. Here is a list of your current Health Maintenance items (your personalized list of preventive services) with a due date: 
Health Maintenance Due Topic Date Due  Shingles Vaccine (1 of 2) 11/27/1995  Glaucoma Screening   07/07/2012  Pneumococcal Vaccine (2 of 2 - PCV13) 02/24/2015 [facility] Annual Well Visit  06/14/2019

## 2019-06-19 NOTE — PROGRESS NOTES
This is the Subsequent Medicare Annual Wellness Exam, performed 12 months or more after the Initial AWV or the last Subsequent AWV    I have reviewed the patient's medical history in detail and updated the computerized patient record. History     Past Medical History:   Diagnosis Date    Allergy     Anxiety associated with depression 8/2/2011    Arthritis     [facility]itis, ischemic [facility] [address]     Connective tissue disease ([facility] [address]     Degeneration of intervertebral disc of cervical region     Most recent MRI was Jan 2017, done by Dr. Lizzette Segovia    Degenerative disc disease     Depression     anxiety    Generalized arthritis 1/24/2018    GERD (gastroesophageal reflux disease)     Headache(784.0)     migraines    HH (hiatus hernia)     Hyperlipidemia     Hypertension     Hypovitaminosis D 10/19/2015    Osteopenia 6/7/2017    Other forms of migraine, without mention of intractable migraine without mention of status migrainosus     Pulmonary nodules 2/25/2010    Raynaud's syndrome     Renal cyst 2/25/2014    Rosacea       Past Surgical History:   Procedure Laterality Date    CARDIAC SURG PROCEDURE UNLIST      cardiac cath    HX BUNIONECTOMY      HX CARPAL TUNNEL RELEASE      right    HX CERVICAL FUSION  1987    C5-6    HX KNEE ARTHROSCOPY      RIGHT    HX LUMBAR FUSION  1988 &1997    L5-S1 surgical repair    HX MOHS PROCEDURES      HX OTHER SURGICAL Left 1-7-16    Dr Arleth Bronson VB General foot surgery     HX OTHER SURGICAL  1-2016    foot surgery    TOTAL KNEE ARTHROPLASTY  10/2011    right knee     Current Outpatient Medications   Medication Sig Dispense Refill    atorvastatin (LIPITOR) 20 mg tablet TAKE 1 TABLET (20 MG) BY MOUTH ONCE DAILY 90 Tab 2    verapamil ER (VERELAN) 360 mg ER capsule Take 1 Cap by mouth daily.  90 Cap 2    dexlansoprazole (DEXILANT) 60 mg CpDB capsule (delayed release) TAKE 1 CAPSULE DAILY 90 Cap 2    furosemide (LASIX) 20 mg tablet Take 1 Tab by mouth daily as needed (edema). 90 Tab 1    galcanezumab-gnlm (EMGALITY SYRINGE) 120 mg/mL syrg by SubCUTAneous route.  SYNTHROID 50 mcg tablet TAKE 1 TABLET DAILY BEFORE BREAKFAST 90 Tab 2    naratriptan (AMERGE) 2.5 mg tab Take 2.5 mg by mouth once as needed.  divalproex ER (DEPAKOTE ER) 500 mg ER tablet 500 mg daily. 1    calcium-cholecalciferol, d3, (CALCIUM 600 + D) 600-125 mg-unit tab Take  by mouth two (2) times a day.  coenzyme q10 (CHEW Q) 100 mg chew Take 200 mg by mouth daily.  multivitamin (ONE A DAY) tablet Take 1 Tab by mouth daily.  magnesium Tab Take  by mouth two (2) times a day.  methylPREDNISolone (MEDROL DOSEPACK) 4 mg tablet As directed on package 1 Dose Pack 0    tretinoin (RETIN-A) 0.025 % topical cream Apply  to affected area nightly.        Allergies   Allergen Reactions    Pcn [Penicillins] Rash     Was tested is no longer allergic     Family History   Problem Relation Age of Onset    Stroke Mother     Stroke Father      Social History     Tobacco Use    Smoking status: Never Smoker    Smokeless tobacco: Never Used   Substance Use Topics    Alcohol use: No     Patient Active Problem List   Diagnosis Code    Hyperlipidemia E78.5    Connective tissue disease ([facility] [address] M35.9    Depression F32.9    HH (hiatus hernia) K44.9    Gastroesophageal reflux disease K21.9    Rosacea L71.9    Capsulitis M77.9    Degeneration of intervertebral disc of cervical region M50.30    Allergy T78.40XA    Colitis, ischemic (HCC) K55.9    Anxiety associated with depression F41.8    Raynaud's syndrome I73.00    Hypothyroidism E03.9    Pulmonary nodules R91.8    Renal cyst N28.1    Hypovitaminosis D E55.9    Biliary dyskinesia K82.8    Osteoarthritis M19.90    Dyslipidemia E78.5    Classical migraine with intractable migraine G43.119    Rotator cuff tear arthropathy M75.100, M12.819    Lower gastrointestinal hemorrhage K92.2    Cervico-occipital neuralgia M54.81    History of cervical spinal surgery Z98.890    History of knee surgery Z98.890    Diarrhea R19.7    Osteopenia M85.80    Generalized arthritis M19.90    Essential hypertension I10       Depression Risk Factor Screening:     3 most recent PHQ Screens 6/7/2017   Little interest or pleasure in doing things Not at all   Feeling down, depressed, irritable, or hopeless Not at all   Total Score PHQ 2 0     Alcohol Risk Factor Screening: You do not drink alcohol or very rarely. Functional Ability and Level of Safety:   Hearing Loss  Hearing is good. Activities of Daily Living  The home contains: no safety equipment. Patient does total self care    Fall Risk  Fall Risk Assessment, last 12 mths 5/29/2019   Able to walk? Yes   Fall in past 12 months? Yes   Fall with injury? Yes   Number of falls in past 12 months 1   Fall Risk Score 2       Abuse Screen  Patient is not abused    Cognitive Screening   Evaluation of Cognitive Function:  Has your family/caregiver stated any concerns about your memory: no  Normal    Patient Care Team   Patient Care Team:  Morena Fry MD as PCP - General Parish Greene MD (Ophthalmology)    Assessment/Plan   Education and counseling provided:  Are appropriate based on today's review and evaluation    Diagnoses and all orders for this visit:    1. Medicare annual wellness visit, subsequent    2. Essential hypertension  -     LIPID PANEL; Future  -     TSH 3RD GENERATION; Future  -     T4, FREE; Future    3. Hypovitaminosis D  -     VITAMIN D, 25 HYDROXY; Future    4. Pure hypercholesterolemia    5. Generalized arthritis    6. Venous insufficiency  -     REFERRAL TO VASCULAR SURGERY    Other orders  -     atorvastatin (LIPITOR) 20 mg tablet; TAKE 1 TABLET (20 MG) BY MOUTH ONCE DAILY  -     verapamil ER (VERELAN) 360 mg ER capsule; Take 1 Cap by mouth daily.   -     dexlansoprazole (DEXILANT) 60 mg CpDB capsule (delayed release); TAKE 1 CAPSULE DAILY        Health Maintenance Due   Topic Date Due    Shingrix Vaccine Age 50> (1 of 2) 11/27/1995    GLAUCOMA SCREENING Q2Y  07/07/2012    Pneumococcal 65+ years (2 of 2 - PCV13) 02/24/2015

## 2019-06-20 LAB — 25(OH)D3 SERPL-MCNC: 34.2 NG/ML (ref 30–100)

## 2019-06-20 NOTE — PROGRESS NOTES
Assessment/Plan:    *Diagnoses and all orders for this visit:    1. Essential hypertension  -     LIPID PANEL; Future  -     TSH 3RD GENERATION; Future  -     T4, FREE; Future    2. Hypovitaminosis D  -     VITAMIN D, 25 HYDROXY; Future    3. Medicare annual wellness visit, subsequent    4. Pure hypercholesterolemia    5. Generalized arthritis    6. Venous insufficiency  -     REFERRAL TO VASCULAR SURGERY    Other orders  -     atorvastatin (LIPITOR) 20 mg tablet; TAKE 1 TABLET (20 MG) BY MOUTH ONCE DAILY  -     verapamil ER (VERELAN) 360 mg ER capsule; Take 1 Cap by mouth daily. -     dexlansoprazole (DEXILANT) 60 mg CpDB capsule (delayed release); TAKE 1 CAPSULE DAILY        Pt will return in a few weeks for a memory eval.    The plan was discussed with the patient. The patient verbalized understanding and is in agreement with the plan. All medication potential side effects were discussed with the patient.    -------------------------------------------------------------------------------------------------------------------        Yohana Cast is a 68 y.o. female and presents with Annual Wellness Visit; Hip Pain (Left hip ); Arthritis; Foot Pain (left foot pain and swelling ); Memory Loss; and Tick Removal         Subjective:  Pt here for f/u. HTN: borderline controlled. Pt having a lot of joint pains. Pt having issues with venous insufficiency LT leg, we diagnosed this on PVL. She is taking Lasix 20 mg, is wearing compression socks. Is aggravated that she has this issue and would like to see a vascular specialist.      ROS:  Review of Systems - Negative        The problem list was updated as a part of today's visit.   Patient Active Problem List   Diagnosis Code    Hyperlipidemia E78.5    Connective tissue disease ([facility] [address] M35.9    Depression F32.9    HH (hiatus hernia) K44.9    Gastroesophageal reflux disease K21.9    Rosacea L71.9    Capsulitis M77.9    Degeneration of intervertebral disc of cervical region M50.30    Allergy T78.40XA    Colitis, ischemic (HCC) K55.9    Anxiety associated with depression F41.8    Raynaud's syndrome I73.00    Hypothyroidism E03.9    Pulmonary nodules R91.8    Renal cyst N28.1    Hypovitaminosis D E55.9    Biliary dyskinesia K82.8    Osteoarthritis M19.90    Dyslipidemia E78.5    Classical migraine with intractable migraine G43.119    Rotator cuff tear arthropathy M75.100, M12.819    Lower gastrointestinal hemorrhage K92.2    Cervico-occipital neuralgia M54.81    History of cervical spinal surgery Z98.890    History of knee surgery Z98.890    Diarrhea R19.7    Osteopenia M85.80    Generalized arthritis M19.90    Essential hypertension I10       The PSH, FH were reviewed. SH:  Social History     Tobacco Use    Smoking status: Never Smoker    Smokeless tobacco: Never Used   Substance Use Topics    Alcohol use: No    Drug use: No       Medications/Allergies:  Current Outpatient Medications on File Prior to Visit   Medication Sig Dispense Refill    furosemide (LASIX) 20 mg tablet Take 1 Tab by mouth daily as needed (edema). 90 Tab 1    galcanezumab-gnlm (EMGALITY SYRINGE) 120 mg/mL syrg by SubCUTAneous route.  SYNTHROID 50 mcg tablet TAKE 1 TABLET DAILY BEFORE BREAKFAST 90 Tab 2    naratriptan (AMERGE) 2.5 mg tab Take 2.5 mg by mouth once as needed.  divalproex ER (DEPAKOTE ER) 500 mg ER tablet 500 mg daily. 1    calcium-cholecalciferol, d3, (CALCIUM 600 + D) 600-125 mg-unit tab Take  by mouth two (2) times a day.  coenzyme q10 (CHEW Q) 100 mg chew Take 200 mg by mouth daily.  multivitamin (ONE A DAY) tablet Take 1 Tab by mouth daily.  magnesium Tab Take  by mouth two (2) times a day.  methylPREDNISolone (MEDROL DOSEPACK) 4 mg tablet As directed on package 1 Dose Pack 0    tretinoin (RETIN-A) 0.025 % topical cream Apply  to affected area nightly.        No current facility-administered medications on file prior to visit. Allergies   Allergen Reactions    Pcn [Penicillins] Rash     Was tested is no longer allergic         Health Maintenance:   Health Maintenance   Topic Date Due    Shingrix Vaccine Age 49> (1 of 2) 11/27/1995    GLAUCOMA SCREENING Q2Y  07/07/2012    Pneumococcal 65+ years (2 of 2 - PCV13) 02/24/2015    Influenza Age 9 to Adult  08/01/2019    MEDICARE YEARLY EXAM  06/19/2020    BREAST CANCER SCRN MAMMOGRAM  08/08/2020    COLONOSCOPY  07/07/2022    DTaP/Tdap/Td series (4 - Td) 04/22/2026    Hepatitis C Screening  Completed    Bone Densitometry (Dexa) Screening  Completed       Objective:  Visit Vitals  /90 (BP 1 Location: Left arm, BP Patient Position: Sitting)   Pulse 60   Temp 97.8 °F (36.6 °C) (Oral)   Resp 16   Ht 5' 7\" (1.702 m)   Wt 168 lb (76.2 kg)   LMP  (LMP Unknown)   SpO2 99%   BMI 26.31 kg/m²          Nurses notes and VS reviewed.       Physical Examination: General appearance - alert, well appearing, and in no distress  Ears - bilateral TM's and external ear canals normal  Mouth - mucous membranes moist, pharynx normal without lesions  Neck - supple, no significant adenopathy  Chest - clear to auscultation, no wheezes, rales or rhonchi, symmetric air entry  Heart - normal rate and regular rhythm, systolic murmur  Abdomen - soft, nontender, nondistended, no masses or organomegaly  Musculoskeletal - no joint tenderness, deformity or swelling  Extremities - peripheral pulses normal, no pedal edema, no clubbing or cyanosis          Labwork and Ancillary Studies:    CBC w/Diff  Lab Results   Component Value Date/Time    WBC 5.4 05/01/2019 11:49 AM    HGB 14.3 05/01/2019 11:49 AM    PLATELET 462 04/13/6415 11:49 AM         Basic Metabolic Profile  Lab Results   Component Value Date/Time    Sodium 141 05/01/2019 11:49 AM    Potassium 4.4 05/01/2019 11:49 AM    Chloride 106 05/01/2019 11:49 AM    CO2 28 05/01/2019 11:49 AM    Anion gap 7 05/01/2019 11:49 AM    Glucose 88 05/01/2019 11:49 AM    BUN 30 (H) 05/01/2019 11:49 AM    Creatinine 0.88 05/01/2019 11:49 AM    BUN/Creatinine ratio 34 (H) 05/01/2019 11:49 AM    GFR est AA >60 05/01/2019 11:49 AM    GFR est non-AA >60 05/01/2019 11:49 AM    Calcium 9.0 05/01/2019 11:49 AM         LFT  Lab Results   Component Value Date/Time    ALT (SGPT) 18 05/01/2019 11:49 AM    AST (SGOT) 14 (L) 05/01/2019 11:49 AM    Alk.  phosphatase 88 05/01/2019 11:49 AM    Bilirubin, direct 0.1 05/01/2019 11:49 AM    Bilirubin, total 0.5 05/01/2019 11:49 AM         Cholesterol  Lab Results   Component Value Date/Time    Cholesterol, total 144 06/15/2018 01:23 PM    HDL Cholesterol 61 (H) 06/15/2018 01:23 PM    LDL, calculated 64.4 06/15/2018 01:23 PM    Triglyceride 93 06/15/2018 01:23 PM    CHOL/HDL Ratio 2.4 06/15/2018 01:23 PM

## 2019-06-21 RX ORDER — DEXLANSOPRAZOLE 60 MG/1
CAPSULE, DELAYED RELEASE ORAL
Qty: 90 CAP | Refills: 1 | Status: SHIPPED | OUTPATIENT
Start: 2019-06-21 | End: 2019-11-21

## 2019-07-15 ENCOUNTER — OFFICE VISIT (OUTPATIENT)
Dept: FAMILY MEDICINE CLINIC | Age: 74
End: 2019-07-15

## 2019-07-15 VITALS
OXYGEN SATURATION: 98 % | DIASTOLIC BLOOD PRESSURE: 90 MMHG | SYSTOLIC BLOOD PRESSURE: 140 MMHG | RESPIRATION RATE: 16 BRPM | BODY MASS INDEX: 25.74 KG/M2 | TEMPERATURE: 97.9 F | HEIGHT: 67 IN | HEART RATE: 73 BPM | WEIGHT: 164 LBS

## 2019-07-15 DIAGNOSIS — I10 ESSENTIAL HYPERTENSION: ICD-10-CM

## 2019-07-15 DIAGNOSIS — R41.3 MEMORY DIFFICULTIES: Primary | ICD-10-CM

## 2019-07-15 RX ORDER — LISINOPRIL 10 MG/1
10 TABLET ORAL DAILY
Qty: 90 TAB | Refills: 1 | Status: SHIPPED | OUTPATIENT
Start: 2019-07-15 | End: 2019-10-26 | Stop reason: SDUPTHER

## 2019-07-15 NOTE — PROGRESS NOTES
Almas De La O is a 68 y.o. female (: 1945) presenting to address:    Chief Complaint   Patient presents with    Memory Loss       Vitals:    07/15/19 1322   BP: 142/84   Pulse: 73   Resp: 16   Temp: 97.9 °F (36.6 °C)   TempSrc: Oral   SpO2: 98%   Weight: 164 lb (74.4 kg)   Height: 5' 7\" (1.702 m)   PainSc:   4   PainLoc: Generalized       Hearing/Vision:   No exam data present    Learning Assessment:     Learning Assessment 2/3/2015   PRIMARY LEARNER Patient   HIGHEST LEVEL OF EDUCATION - PRIMARY LEARNER  4 YEARS OF COLLEGE   BARRIERS PRIMARY LEARNER NONE   CO-LEARNER CAREGIVER No   PRIMARY LANGUAGE ENGLISH   LEARNER PREFERENCE PRIMARY LISTENING   ANSWERED BY self   RELATIONSHIP SELF     Depression Screening:     3 most recent PHQ Screens 2017   Little interest or pleasure in doing things Not at all   Feeling down, depressed, irritable, or hopeless Not at all   Total Score PHQ 2 0     Fall Risk Assessment:     Fall Risk Assessment, last 12 mths 2019   Able to walk? Yes   Fall in past 12 months? Yes   Fall with injury? Yes   Number of falls in past 12 months 1   Fall Risk Score 2     Abuse Screening:     Abuse Screening Questionnaire 2017   Do you ever feel afraid of your partner? N   Are you in a relationship with someone who physically or mentally threatens you? N   Is it safe for you to go home? Y     Coordination of Care Questionaire:   1. Have you been to the ER, urgent care clinic since your last visit? Hospitalized since your last visit? NO    2. Have you seen or consulted any other health care providers outside of the [address] [place] [address] since your last visit? Include any pap smears or colon screening. NO    Advanced Directive:   1. Do you have an Advanced Directive? NO    2. Would you like information on Advanced Directives?  NO

## 2019-07-15 NOTE — PATIENT INSTRUCTIONS
High Blood Pressure: Care Instructions  Overview    It's normal for blood pressure to go up and down throughout the day. But if it stays up, you have high blood pressure. Another name for high blood pressure is hypertension. Despite what a lot of people think, high blood pressure usually doesn't cause headaches or make you feel dizzy or lightheaded. It usually has no symptoms. But it does increase your risk of stroke, heart attack, and other problems. You and your doctor will talk about your risks of these problems based on your blood pressure. Your doctor will give you a goal for your blood pressure. Your goal will be based on your health and your age. Lifestyle changes, such as eating healthy and being active, are always important to help lower blood pressure. You might also take medicine to reach your blood pressure goal.  Follow-up care is a key part of your treatment and safety. Be sure to make and go to all appointments, and call your doctor if you are having problems. It's also a good idea to know your test results and keep a list of the medicines you take. How can you care for yourself at home? Medical treatment  · If you stop taking your medicine, your blood pressure will go back up. You may take one or more types of medicine to lower your blood pressure. Be safe with medicines. Take your medicine exactly as prescribed. Call your doctor if you think you are having a problem with your medicine. · Talk to your doctor before you start taking aspirin every day. Aspirin can help certain people lower their risk of a heart attack or stroke. But taking aspirin isn't right for everyone, because it can cause serious bleeding. · See your doctor regularly. You may need to see the doctor more often at first or until your blood pressure comes down. · If you are taking blood pressure medicine, talk to your doctor before you take decongestants or anti-inflammatory medicine, such as ibuprofen.  Some of these medicines can raise blood pressure. · Learn how to check your blood pressure at home. Lifestyle changes  · Stay at a healthy weight. This is especially important if you put on weight around the waist. Losing even 10 pounds can help you lower your blood pressure. · If your doctor recommends it, get more exercise. Walking is a good choice. Bit by bit, increase the amount you walk every day. Try for at least 30 minutes on most days of the week. You also may want to swim, bike, or do other activities. · Avoid or limit alcohol. Talk to your doctor about whether you can drink any alcohol. · Try to limit how much sodium you eat to less than 2,300 milligrams (mg) a day. Your doctor may ask you to try to eat less than 1,500 mg a day. · Eat plenty of fruits (such as bananas and oranges), vegetables, legumes, whole grains, and low-fat dairy products. · Lower the amount of saturated fat in your diet. Saturated fat is found in animal products such as milk, cheese, and meat. Limiting these foods may help you lose weight and also lower your risk for heart disease. · Do not smoke. Smoking increases your risk for heart attack and stroke. If you need help quitting, talk to your doctor about stop-smoking programs and medicines. These can increase your chances of quitting for good. When should you call for help? Call 911 anytime you think you may need emergency care. This may mean having symptoms that suggest that your blood pressure is causing a serious heart or blood vessel problem. Your blood pressure may be over 180/120.   For example, call 911 if:    · You have symptoms of a heart attack. These may include:  ? Chest pain or pressure, or a strange feeling in the chest.  ? Sweating. ? Shortness of breath. ? Nausea or vomiting. ? Pain, pressure, or a strange feeling in the back, neck, jaw, or upper belly or in one or both shoulders or arms. ? Lightheadedness or sudden weakness.   ? A fast or irregular heartbeat.     · You have symptoms of a stroke. These may include:  ? Sudden numbness, tingling, weakness, or loss of movement in your face, arm, or leg, especially on only one side of your body. ? Sudden vision changes. ? Sudden trouble speaking. ? Sudden confusion or trouble understanding simple statements. ? Sudden problems with walking or balance. ? A sudden, severe headache that is different from past headaches.     · You have severe back or belly pain.    Do not wait until your blood pressure comes down on its own. Get help right away.   Call your doctor now or seek immediate care if:    · Your blood pressure is much higher than normal (such as 180/120 or higher), but you don't have symptoms.     · You think high blood pressure is causing symptoms, such as:  ? Severe headache.  ? Blurry vision.    Watch closely for changes in your health, and be sure to contact your doctor if:    · Your blood pressure measures higher than your doctor recommends at least 2 times. That means the top number is higher or the bottom number is higher, or both.     · You think you may be having side effects from your blood pressure medicine. Where can you learn more? Go to http://gilda-michael.info/. Enter G009 in the search box to learn more about \"High Blood Pressure: Care Instructions. \"  Current as of: July 22, 2018  Content Version: 11.9  © 7906-4440 [employer]. Care instructions adapted under license by [employer] (which disclaims liability or warranty for this information). If you have questions about a medical condition or this instruction, always ask your healthcare professional. [place] [address] any warranty or liability for your use of this information.

## 2019-07-15 NOTE — PROGRESS NOTES
Assessment/Plan:    *Diagnoses and all orders for this visit:    1. Memory difficulties    2. Essential hypertension  -     lisinopril (PRINIVIL, ZESTRIL) 10 mg tablet; Take 1 Tab by mouth daily. Will add Lisinopril 10 mg.  F/u 1 month for this. Her score on MOCA was 27/30. Reassured pt that she does not have any issue with her memory at this time. I spent 30 minutes with patient today and > 50% of the visit was dedicated to discussing and counseling the patient on the results of her [address] test, the importance of staying active mentally and physically. The plan was discussed with the patient. The patient verbalized understanding and is in agreement with the plan. All medication potential side effects were discussed with the patient.    -------------------------------------------------------------------------------------------------------------------        Katie Gutierrez is a 68 y.o. female and presents with Memory Loss         Subjective:  Pt is concerned about her memory and is here to have this evaluated. She has been evaluated some years ago and all was fine. HTN:  Not at goal.      ROS:  Review of Systems - Negative except as above        The problem list was updated as a part of today's visit.   Patient Active Problem List   Diagnosis Code    Hyperlipidemia E78.5    Connective tissue disease ([facility] [address] M35.9    Depression F32.9    HH (hiatus hernia) K44.9    Gastroesophageal reflux disease K21.9    Rosacea L71.9    Capsulitis M77.9    Degeneration of intervertebral disc of cervical region M50.30    Allergy T78.40XA    Colitis, ischemic ([facility] [address] K55.9    Anxiety associated with depression F41.8    Raynaud's syndrome I73.00    Hypothyroidism E03.9    Pulmonary nodules R91.8    Renal cyst N28.1    Hypovitaminosis D E55.9    Biliary dyskinesia K82.8    Osteoarthritis M19.90    Dyslipidemia E78.5    Classical migraine with intractable migraine G43.119    Rotator cuff tear arthropathy M75.100, M12.819    Lower gastrointestinal hemorrhage K92.2    Cervico-occipital neuralgia M54.81    History of cervical spinal surgery Z98.890    History of knee surgery Z98.890    Diarrhea R19.7    Osteopenia M85.80    Generalized arthritis M19.90    Essential hypertension I10       The PSH, FH were reviewed. SH:  Social History     Tobacco Use    Smoking status: Never Smoker    Smokeless tobacco: Never Used   Substance Use Topics    Alcohol use: No    Drug use: No       Medications/Allergies:  Current Outpatient Medications on File Prior to Visit   Medication Sig Dispense Refill    dexlansoprazole (DEXILANT) 60 mg CpDB capsule (delayed release) TAKE 1 CAPSULE DAILY 90 Cap 1    verapamil ER (VERELAN) 360 mg ER capsule Take 1 Cap by mouth daily. 90 Cap 2    furosemide (LASIX) 20 mg tablet Take 1 Tab by mouth daily as needed (edema). 90 Tab 1    galcanezumab-gnlm (EMGALITY SYRINGE) 120 mg/mL syrg by SubCUTAneous route.  tretinoin (RETIN-A) 0.025 % topical cream Apply  to affected area nightly.  SYNTHROID 50 mcg tablet TAKE 1 TABLET DAILY BEFORE BREAKFAST 90 Tab 2    naratriptan (AMERGE) 2.5 mg tab Take 2.5 mg by mouth once as needed.  divalproex ER (DEPAKOTE ER) 500 mg ER tablet 500 mg daily. 1    calcium-cholecalciferol, d3, (CALCIUM 600 + D) 600-125 mg-unit tab Take  by mouth two (2) times a day.  coenzyme q10 (CHEW Q) 100 mg chew Take 200 mg by mouth daily.  multivitamin (ONE A DAY) tablet Take 1 Tab by mouth daily.  magnesium Tab Take  by mouth two (2) times a day. No current facility-administered medications on file prior to visit.          Allergies   Allergen Reactions    Pcn [Penicillins] Rash     Was tested is no longer allergic         Health Maintenance:   Health Maintenance   Topic Date Due    Shingrix Vaccine Age 49> (1 of 2) 11/27/1995    GLAUCOMA SCREENING Q2Y  07/07/2012    Pneumococcal 65+ years (2 of 2 - PCV13) 02/24/2015  Influenza Age 5 to Adult  08/01/2019    MEDICARE YEARLY EXAM  06/19/2020    BREAST CANCER SCRN MAMMOGRAM  08/08/2020    COLONOSCOPY  07/07/2022    DTaP/Tdap/Td series (4 - Td) 04/22/2026    Hepatitis C Screening  Completed    Bone Densitometry (Dexa) Screening  Completed       Objective:  Visit Vitals  /90   Pulse 73   Temp 97.9 °F (36.6 °C) (Oral)   Resp 16   Ht 5' 7\" (1.702 m)   Wt 164 lb (74.4 kg)   LMP  (LMP Unknown)   SpO2 98%   BMI 25.69 kg/m²          Nurses notes and VS reviewed. Physical Examination: General appearance - alert, well appearing, and in no distress          Labwork and Ancillary Studies:    CBC w/Diff  Lab Results   Component Value Date/Time    WBC 5.4 05/01/2019 11:49 AM    HGB 14.3 05/01/2019 11:49 AM    PLATELET 498 96/97/3446 11:49 AM         Basic Metabolic Profile  Lab Results   Component Value Date/Time    Sodium 141 05/01/2019 11:49 AM    Potassium 4.4 05/01/2019 11:49 AM    Chloride 106 05/01/2019 11:49 AM    CO2 28 05/01/2019 11:49 AM    Anion gap 7 05/01/2019 11:49 AM    Glucose 88 05/01/2019 11:49 AM    BUN 30 (H) 05/01/2019 11:49 AM    Creatinine 0.88 05/01/2019 11:49 AM    BUN/Creatinine ratio 34 (H) 05/01/2019 11:49 AM    GFR est AA >60 05/01/2019 11:49 AM    GFR est non-AA >60 05/01/2019 11:49 AM    Calcium 9.0 05/01/2019 11:49 AM         LFT  Lab Results   Component Value Date/Time    ALT (SGPT) 18 05/01/2019 11:49 AM    AST (SGOT) 14 (L) 05/01/2019 11:49 AM    Alk.  phosphatase 88 05/01/2019 11:49 AM    Bilirubin, direct 0.1 05/01/2019 11:49 AM    Bilirubin, total 0.5 05/01/2019 11:49 AM         Cholesterol  Lab Results   Component Value Date/Time    Cholesterol, total 140 06/19/2019 02:07 PM    HDL Cholesterol 65 (H) 06/19/2019 02:07 PM    LDL, calculated 62.8 06/19/2019 02:07 PM    Triglyceride 61 06/19/2019 02:07 PM    CHOL/HDL Ratio 2.2 06/19/2019 02:07 PM

## 2019-08-19 ENCOUNTER — OFFICE VISIT (OUTPATIENT)
Dept: FAMILY MEDICINE CLINIC | Age: 74
End: 2019-08-19

## 2019-08-19 VITALS
OXYGEN SATURATION: 98 % | SYSTOLIC BLOOD PRESSURE: 110 MMHG | RESPIRATION RATE: 16 BRPM | DIASTOLIC BLOOD PRESSURE: 74 MMHG | BODY MASS INDEX: 25.58 KG/M2 | HEIGHT: 67 IN | WEIGHT: 163 LBS | TEMPERATURE: 97.1 F | HEART RATE: 74 BPM

## 2019-08-19 DIAGNOSIS — I10 ESSENTIAL HYPERTENSION: Primary | ICD-10-CM

## 2019-08-19 DIAGNOSIS — I87.2 VENOUS INSUFFICIENCY: ICD-10-CM

## 2019-08-19 DIAGNOSIS — Z23 ENCOUNTER FOR IMMUNIZATION: ICD-10-CM

## 2019-08-19 NOTE — PROGRESS NOTES
Triny Lin is a 68 y.o. female (: 1945) presenting to address:    Chief Complaint   Patient presents with    Blood Pressure Check       Vitals:    19 1302   BP: 110/74   Pulse: 74   Resp: 16   Temp: 97.1 °F (36.2 °C)   TempSrc: Oral   SpO2: 98%   Weight: 163 lb (73.9 kg)   Height: 5' 7\" (1.702 m)   PainSc:   5   PainLoc: Generalized       Hearing/Vision:   No exam data present    Learning Assessment:     Learning Assessment 2/3/2015   PRIMARY LEARNER Patient   HIGHEST LEVEL OF EDUCATION - PRIMARY LEARNER  4 YEARS OF COLLEGE   BARRIERS PRIMARY LEARNER NONE   CO-LEARNER CAREGIVER No   PRIMARY LANGUAGE ENGLISH   LEARNER PREFERENCE PRIMARY LISTENING   ANSWERED BY self   RELATIONSHIP SELF     Depression Screening:     3 most recent PHQ Screens 2017   Little interest or pleasure in doing things Not at all   Feeling down, depressed, irritable, or hopeless Not at all   Total Score PHQ 2 0     Fall Risk Assessment:     Fall Risk Assessment, last 12 mths 2019   Able to walk? Yes   Fall in past 12 months? Yes   Fall with injury? Yes   Number of falls in past 12 months 1   Fall Risk Score 2     Abuse Screening:     Abuse Screening Questionnaire 2017   Do you ever feel afraid of your partner? N   Are you in a relationship with someone who physically or mentally threatens you? N   Is it safe for you to go home? Y     Coordination of Care Questionaire:   1. Have you been to the ER, urgent care clinic since your last visit? Hospitalized since your last visit? NO    2. Have you seen or consulted any other health care providers outside of the [address] [place] [address] since your last visit? Include any pap smears or colon screening. NO    Advanced Directive:   1. Do you have an Advanced Directive? NO    2. Would you like information on Advanced Directives? NO    Flu shot Immunization/s administered 2019 by Vanessa Chang LPN with guardian's consent. Patient tolerated procedure well.   No reactions noted.

## 2019-08-19 NOTE — PATIENT INSTRUCTIONS
High Blood Pressure: Care Instructions  Overview    It's normal for blood pressure to go up and down throughout the day. But if it stays up, you have high blood pressure. Another name for high blood pressure is hypertension. Despite what a lot of people think, high blood pressure usually doesn't cause headaches or make you feel dizzy or lightheaded. It usually has no symptoms. But it does increase your risk of stroke, heart attack, and other problems. You and your doctor will talk about your risks of these problems based on your blood pressure. Your doctor will give you a goal for your blood pressure. Your goal will be based on your health and your age. Lifestyle changes, such as eating healthy and being active, are always important to help lower blood pressure. You might also take medicine to reach your blood pressure goal.  Follow-up care is a key part of your treatment and safety. Be sure to make and go to all appointments, and call your doctor if you are having problems. It's also a good idea to know your test results and keep a list of the medicines you take. How can you care for yourself at home? Medical treatment  · If you stop taking your medicine, your blood pressure will go back up. You may take one or more types of medicine to lower your blood pressure. Be safe with medicines. Take your medicine exactly as prescribed. Call your doctor if you think you are having a problem with your medicine. · Talk to your doctor before you start taking aspirin every day. Aspirin can help certain people lower their risk of a heart attack or stroke. But taking aspirin isn't right for everyone, because it can cause serious bleeding. · See your doctor regularly. You may need to see the doctor more often at first or until your blood pressure comes down. · If you are taking blood pressure medicine, talk to your doctor before you take decongestants or anti-inflammatory medicine, such as ibuprofen.  Some of these medicines can raise blood pressure. · Learn how to check your blood pressure at home. Lifestyle changes  · Stay at a healthy weight. This is especially important if you put on weight around the waist. Losing even 10 pounds can help you lower your blood pressure. · If your doctor recommends it, get more exercise. Walking is a good choice. Bit by bit, increase the amount you walk every day. Try for at least 30 minutes on most days of the week. You also may want to swim, bike, or do other activities. · Avoid or limit alcohol. Talk to your doctor about whether you can drink any alcohol. · Try to limit how much sodium you eat to less than 2,300 milligrams (mg) a day. Your doctor may ask you to try to eat less than 1,500 mg a day. · Eat plenty of fruits (such as bananas and oranges), vegetables, legumes, whole grains, and low-fat dairy products. · Lower the amount of saturated fat in your diet. Saturated fat is found in animal products such as milk, cheese, and meat. Limiting these foods may help you lose weight and also lower your risk for heart disease. · Do not smoke. Smoking increases your risk for heart attack and stroke. If you need help quitting, talk to your doctor about stop-smoking programs and medicines. These can increase your chances of quitting for good. When should you call for help? Call 911 anytime you think you may need emergency care. This may mean having symptoms that suggest that your blood pressure is causing a serious heart or blood vessel problem. Your blood pressure may be over 180/120.   For example, call 911 if:    · You have symptoms of a heart attack. These may include:  ? Chest pain or pressure, or a strange feeling in the chest.  ? Sweating. ? Shortness of breath. ? Nausea or vomiting. ? Pain, pressure, or a strange feeling in the back, neck, jaw, or upper belly or in one or both shoulders or arms. ? Lightheadedness or sudden weakness.   ? A fast or irregular heartbeat.     · You have symptoms of a stroke. These may include:  ? Sudden numbness, tingling, weakness, or loss of movement in your face, arm, or leg, especially on only one side of your body. ? Sudden vision changes. ? Sudden trouble speaking. ? Sudden confusion or trouble understanding simple statements. ? Sudden problems with walking or balance. ? A sudden, severe headache that is different from past headaches.     · You have severe back or belly pain.    Do not wait until your blood pressure comes down on its own. Get help right away.   Call your doctor now or seek immediate care if:    · Your blood pressure is much higher than normal (such as 180/120 or higher), but you don't have symptoms.     · You think high blood pressure is causing symptoms, such as:  ? Severe headache.  ? Blurry vision.    Watch closely for changes in your health, and be sure to contact your doctor if:    · Your blood pressure measures higher than your doctor recommends at least 2 times. That means the top number is higher or the bottom number is higher, or both.     · You think you may be having side effects from your blood pressure medicine. Where can you learn more? Go to http://gilda-michael.info/. Enter V427 in the search box to learn more about \"High Blood Pressure: Care Instructions. \"  Current as of: July 22, 2018  Content Version: 12.1  © 8890-6791 [employer]. Care instructions adapted under license by [employer] (which disclaims liability or warranty for this information). If you have questions about a medical condition or this instruction, always ask your healthcare professional. [place] [address] any warranty or liability for your use of this information.

## 2019-08-19 NOTE — PROGRESS NOTES
Assessment/Plan:    *Diagnoses and all orders for this visit:    1. Essential hypertension    2. Venous insufficiency    3. Encounter for immunization  -     INFLUENZA VACCINE INACTIVATED (IIV), SUBUNIT, ADJUVANTED, IM  -     ADMIN INFLUENZA VIRUS VAC        Doing very well with the change in medication. Will continue the same. The plan was discussed with the patient. The patient verbalized understanding and is in agreement with the plan. All medication potential side effects were discussed with the patient.    -------------------------------------------------------------------------------------------------------------------        Trace Colby is a 68 y.o. female and presents with Blood Pressure Check         Subjective:  Pt here for f/u. HTN:  We added Lisinopril to her regimen last month. She has responded well to this, BP is at goal.  No side effects to note. Venous insufficiency:  She has been to vascular, was given advice and following this. She is managing the LT leg swelling well, wearing her stockings. ROS:  Review of Systems - Negative except as above        The problem list was updated as a part of today's visit.   Patient Active Problem List   Diagnosis Code    Hyperlipidemia E78.5    Connective tissue disease ([facility] [address] M35.9    Depression F32.9    HH (hiatus hernia) K44.9    Gastroesophageal reflux disease K21.9    Rosacea L71.9    Capsulitis M77.9    Degeneration of intervertebral disc of cervical region M50.30    Allergy T78.40XA    Colitis, ischemic ([facility] [address] K55.9    Anxiety associated with depression F41.8    Raynaud's syndrome I73.00    Hypothyroidism E03.9    Pulmonary nodules R91.8    Renal cyst N28.1    Hypovitaminosis D E55.9    Biliary dyskinesia K82.8    Osteoarthritis M19.90    Dyslipidemia E78.5    Classical migraine with intractable migraine G43.119    Rotator cuff tear arthropathy M75.100, M12.819    Lower gastrointestinal hemorrhage K92.2    Cervico-occipital neuralgia M54.81    History of cervical spinal surgery Z98.890    History of knee surgery Z98.890    Diarrhea R19.7    Osteopenia M85.80    Generalized arthritis M19.90    Essential hypertension I10       The PSH, FH were reviewed. SH:  Social History     Tobacco Use    Smoking status: Never Smoker    Smokeless tobacco: Never Used   Substance Use Topics    Alcohol use: No    Drug use: No       Medications/Allergies:  Current Outpatient Medications on File Prior to Visit   Medication Sig Dispense Refill    lisinopril (PRINIVIL, ZESTRIL) 10 mg tablet Take 1 Tab by mouth daily. 90 Tab 1    dexlansoprazole (DEXILANT) 60 mg CpDB capsule (delayed release) TAKE 1 CAPSULE DAILY 90 Cap 1    verapamil ER (VERELAN) 360 mg ER capsule Take 1 Cap by mouth daily. 90 Cap 2    furosemide (LASIX) 20 mg tablet Take 1 Tab by mouth daily as needed (edema). 90 Tab 1    galcanezumab-gnlm (EMGALITY SYRINGE) 120 mg/mL syrg by SubCUTAneous route.  tretinoin (RETIN-A) 0.025 % topical cream Apply  to affected area nightly.  SYNTHROID 50 mcg tablet TAKE 1 TABLET DAILY BEFORE BREAKFAST 90 Tab 2    naratriptan (AMERGE) 2.5 mg tab Take 2.5 mg by mouth once as needed.  divalproex ER (DEPAKOTE ER) 500 mg ER tablet 500 mg daily. 1    calcium-cholecalciferol, d3, (CALCIUM 600 + D) 600-125 mg-unit tab Take  by mouth two (2) times a day.  coenzyme q10 (CHEW Q) 100 mg chew Take 200 mg by mouth daily.  multivitamin (ONE A DAY) tablet Take 1 Tab by mouth daily.  magnesium Tab Take  by mouth two (2) times a day. No current facility-administered medications on file prior to visit.          Allergies   Allergen Reactions    Pcn [Penicillins] Rash     Was tested is no longer allergic         Health Maintenance:   Health Maintenance   Topic Date Due    Shingrix Vaccine Age 49> (1 of 2) 11/27/1995    GLAUCOMA SCREENING Q2Y  07/07/2012    Pneumococcal 65+ years (2 of 2 - PCV13) 02/24/2015    Influenza Age 9 to Adult  08/01/2019    MEDICARE YEARLY EXAM  06/19/2020    BREAST CANCER SCRN MAMMOGRAM  08/08/2020    COLONOSCOPY  07/07/2022    DTaP/Tdap/Td series (4 - Td) 04/22/2026    Hepatitis C Screening  Completed    Bone Densitometry (Dexa) Screening  Completed       Objective:  Visit Vitals  /74 (BP 1 Location: Left arm, BP Patient Position: Sitting)   Pulse 74   Temp 97.1 °F (36.2 °C) (Oral)   Resp 16   Ht 5' 7\" (1.702 m)   Wt 163 lb (73.9 kg)   LMP  (LMP Unknown)   SpO2 98%   BMI 25.53 kg/m²          Nurses notes and VS reviewed. Physical Examination: General appearance - alert, well appearing, and in no distress  Chest - clear to auscultation, no wheezes, rales or rhonchi, symmetric air entry  Heart - normal rate, regular rhythm, normal S1, S2, no murmurs, rubs, clicks or gallops  Extremities - peripheral pulses normal, no pedal edema, no clubbing or cyanosis          Labwork and Ancillary Studies:    CBC w/Diff  Lab Results   Component Value Date/Time    WBC 5.4 05/01/2019 11:49 AM    HGB 14.3 05/01/2019 11:49 AM    PLATELET 682 18/38/8736 11:49 AM         Basic Metabolic Profile  Lab Results   Component Value Date/Time    Sodium 141 05/01/2019 11:49 AM    Potassium 4.4 05/01/2019 11:49 AM    Chloride 106 05/01/2019 11:49 AM    CO2 28 05/01/2019 11:49 AM    Anion gap 7 05/01/2019 11:49 AM    Glucose 88 05/01/2019 11:49 AM    BUN 30 (H) 05/01/2019 11:49 AM    Creatinine 0.88 05/01/2019 11:49 AM    BUN/Creatinine ratio 34 (H) 05/01/2019 11:49 AM    GFR est AA >60 05/01/2019 11:49 AM    GFR est non-AA >60 05/01/2019 11:49 AM    Calcium 9.0 05/01/2019 11:49 AM         LFT  Lab Results   Component Value Date/Time    ALT (SGPT) 18 05/01/2019 11:49 AM    AST (SGOT) 14 (L) 05/01/2019 11:49 AM    Alk.  phosphatase 88 05/01/2019 11:49 AM    Bilirubin, direct 0.1 05/01/2019 11:49 AM    Bilirubin, total 0.5 05/01/2019 11:49 AM         Cholesterol  Lab Results   Component Value Date/Time    Cholesterol, total 140 06/19/2019 02:07 PM    HDL Cholesterol 65 (H) 06/19/2019 02:07 PM    LDL, calculated 62.8 06/19/2019 02:07 PM    Triglyceride 61 06/19/2019 02:07 PM    CHOL/HDL Ratio 2.2 06/19/2019 02:07 PM

## 2019-11-21 ENCOUNTER — OFFICE VISIT (OUTPATIENT)
Dept: FAMILY MEDICINE CLINIC | Age: 74
End: 2019-11-21

## 2019-11-21 VITALS
DIASTOLIC BLOOD PRESSURE: 64 MMHG | TEMPERATURE: 96.1 F | RESPIRATION RATE: 16 BRPM | HEIGHT: 67 IN | HEART RATE: 78 BPM | OXYGEN SATURATION: 100 % | SYSTOLIC BLOOD PRESSURE: 105 MMHG | WEIGHT: 158 LBS | BODY MASS INDEX: 24.8 KG/M2

## 2019-11-21 DIAGNOSIS — M25.50 CHRONIC PAIN OF MULTIPLE JOINTS: ICD-10-CM

## 2019-11-21 DIAGNOSIS — I10 ESSENTIAL HYPERTENSION: Primary | ICD-10-CM

## 2019-11-21 DIAGNOSIS — Z23 ENCOUNTER FOR IMMUNIZATION: ICD-10-CM

## 2019-11-21 DIAGNOSIS — K21.9 GASTROESOPHAGEAL REFLUX DISEASE WITHOUT ESOPHAGITIS: ICD-10-CM

## 2019-11-21 DIAGNOSIS — I87.2 VENOUS INSUFFICIENCY: ICD-10-CM

## 2019-11-21 DIAGNOSIS — G89.29 CHRONIC PAIN OF MULTIPLE JOINTS: ICD-10-CM

## 2019-11-21 RX ORDER — ATORVASTATIN CALCIUM 20 MG/1
TABLET, FILM COATED ORAL DAILY
COMMUNITY
End: 2020-04-09

## 2019-11-21 RX ORDER — LIDOCAINE 50 MG/G
OINTMENT TOPICAL
Qty: 50 G | Refills: 2 | Status: SHIPPED | OUTPATIENT
Start: 2019-11-21

## 2019-11-21 RX ORDER — ASCORBIC ACID 500 MG
TABLET ORAL DAILY
COMMUNITY

## 2019-11-21 RX ORDER — CANDESARTAN 8 MG/1
8 TABLET ORAL DAILY
Qty: 90 TAB | Refills: 1 | Status: SHIPPED | OUTPATIENT
Start: 2019-11-21 | End: 2020-04-20

## 2019-11-21 RX ORDER — DULOXETIN HYDROCHLORIDE 30 MG/1
30 CAPSULE, DELAYED RELEASE ORAL DAILY
COMMUNITY
End: 2020-08-31 | Stop reason: ALTCHOICE

## 2019-11-21 RX ORDER — MULTIVIT WITH MINERALS/HERBS
1 TABLET ORAL DAILY
COMMUNITY

## 2019-11-21 RX ORDER — BUPROPION HYDROCHLORIDE 100 MG/1
TABLET ORAL DAILY
COMMUNITY

## 2019-11-21 RX ORDER — PANTOPRAZOLE SODIUM 40 MG/1
40 TABLET, DELAYED RELEASE ORAL DAILY
Qty: 90 TAB | Refills: 1 | Status: SHIPPED | OUTPATIENT
Start: 2019-11-21 | End: 2020-01-21 | Stop reason: ALTCHOICE

## 2019-11-21 NOTE — PROGRESS NOTES
Prevnar 13 administered in the left deltoid  11/21/2019 by Regan Padilla LPN with consent. Patient tolerated procedure well. With no bleeding observed at injection site. No reactions noted.

## 2019-11-21 NOTE — PATIENT INSTRUCTIONS
Vaccine Information Statement    Pneumococcal Conjugate Vaccine (PCV13): What You Need to Know    Many Vaccine Information Statements are available in [place] and other languages. See www.immunize.org/vis  Hojas de información sobre vacunas están disponibles en español y en muchos otros idiomas. Visite www.immunize.org/vis    1. Why get vaccinated? Pneumococcal conjugate vaccine (PCV13) can prevent pneumococcal disease. Pneumococcal disease refers to any illness caused by pneumococcal bacteria. These bacteria can cause many types of illnesses, including pneumonia, which is an infection of the lungs. Pneumococcal bacteria are one of the most common causes of pneumonia. Besides pneumonia, pneumococcal bacteria can also cause:   Ear infections   Sinus infections   Meningitis (infection of the tissue covering the brain and spinal cord)   Bacteremia (bloodstream infection)    Anyone can get pneumococcal disease, but children under 3years of age, people with certain medical conditions, adults 72 years or older, and cigarette smokers are at the highest risk. Most pneumococcal infections are mild. However, some can result in long-term problems, such as brain damage or hearing loss. Meningitis, bacteremia, and pneumonia caused by pneumococcal disease can be fatal.     2. PCV13     PCV13 protects against 13 types of bacteria that cause pneumococcal disease. Infants and young children usually need 4 doses of pneumococcal conjugate vaccine, at 2, 4, 6, and 1515 months of age. In some cases, a child might need fewer than 4 doses to complete PCV13 vaccination. A dose of PCV13 is also recommended for anyone 2 years or older with certain medical conditions if they did not already receive PCV13. This vaccine may be given to adults 72 years or older based on discussions between the patient and health care provider.     3. Talk with your health care provider    Tell your vaccine provider if the person getting the vaccine:   Has had an allergic reaction after a previous dose of PCV13, to an earlier pneumococcal conjugate vaccine known as PCV7, or to any vaccine containing diphtheria toxoid (for example, DTaP), or has any severe, life-threatening allergies. In some cases, your health care provider may decide to postpone PCV13 vaccination to a future visit. People with minor illnesses, such as a cold, may be vaccinated. People who are moderately or severely ill should usually wait until they recover before getting PCV13. Your health care provider can give you more information. 4. Risks of a vaccine reaction     Redness, swelling, pain, or tenderness where the shot is given, and fever, loss of appetite, fussiness (irritability), feeling tired, headache, and chills can happen after PCV13. Maye Lamb children may be at increased risk for seizures caused by fever after PCV13 if it is administered at the same time as inactivated influenza vaccine. Ask your health care provider for more information. People sometimes faint after medical procedures, including vaccination. Tell your provider if you feel dizzy or have vision changes or ringing in the ears. As with any medicine, there is a very remote chance of a vaccine causing a severe allergic reaction, other serious injury, or death. 5. What if there is a serious problem? An allergic reaction could occur after the vaccinated person leaves the clinic. If you see signs of a severe allergic reaction (hives, swelling of the face and throat, difficulty breathing, a fast heartbeat, dizziness, or weakness), call 9-1-1 and get the person to the nearest hospital.    For other signs that concern you, call your health care provider. Adverse reactions should be reported to the Vaccine Adverse Event Reporting System (VAERS). Your health care provider will usually file this report, or you can do it yourself. Visit the VAERS website at www.vaers. hhs.gov or call 7-590.909.1435. [facility] is only for reporting reactions, and [facility] staff do not give medical advice. 6. The National Vaccine Injury Compensation Program    The [facility] Vaccine Injury Compensation Program (VICP) is a federal program that was created to compensate people who may have been injured by certain vaccines. Visit the VICP website at www.hrsa.gov/vaccinecompensation or call 5-806.778.8271 to learn about the program and about filing a claim. There is a time limit to file a claim for compensation. 7. How can I learn more?  Ask your health care provider.  Call your local or state health department.  Contact the Centers for Disease Control and Prevention (CDC):  - Call 9-265.116.7773 (9-204-SFR-INFO) or  - Visit CDCs website at www.cdc.gov/vaccines    Vaccine Information Statement (Interim)  PCV13   10/30/2019  42 JASMIN Salazar [id number]   Department of Health and Human Services  Centers for Disease Control and Prevention    Office Use Only         Gastroesophageal Reflux Disease (GERD): Care Instructions  Your Care Instructions    Gastroesophageal reflux disease (GERD) is the backward flow of stomach acid into the esophagus. The esophagus is the tube that leads from your throat to your stomach. A one-way valve prevents the stomach acid from moving up into this tube. When you have GERD, this valve does not close tightly enough. If you have mild GERD symptoms including heartburn, you may be able to control the problem with antacids or over-the-counter medicine. Changing your diet, losing weight, and making other lifestyle changes can also help reduce symptoms. Follow-up care is a key part of your treatment and safety. Be sure to make and go to all appointments, and call your doctor if you are having problems. It's also a good idea to know your test results and keep a list of the medicines you take. How can you care for yourself at home? · Take your medicines exactly as prescribed.  Call your doctor if you think you are having a problem with your medicine. · Your doctor may recommend over-the-counter medicine. For mild or occasional indigestion, antacids, such as Tums, Gaviscon, Mylanta, or Maalox, may help. Your doctor also may recommend over-the-counter acid reducers, such as Pepcid AC, Tagamet HB, Zantac 75, or Prilosec. Read and follow all instructions on the label. If you use these medicines often, talk with your doctor. · Change your eating habits. ? It's best to eat several small meals instead of two or three large meals. ? After you eat, wait 2 to 3 hours before you lie down. ? Chocolate, mint, and alcohol can make GERD worse. ? Spicy foods, foods that have a lot of acid (like tomatoes and oranges), and coffee can make GERD symptoms worse in some people. If your symptoms are worse after you eat a certain food, you may want to stop eating that food to see if your symptoms get better. · Do not smoke or chew tobacco. Smoking can make GERD worse. If you need help quitting, talk to your doctor about stop-smoking programs and medicines. These can increase your chances of quitting for good. · If you have GERD symptoms at night, raise the head of your bed 6 to 8 inches by putting the frame on blocks or placing a foam wedge under the head of your mattress. (Adding extra pillows does not work.)  · Do not wear tight clothing around your middle. · Lose weight if you need to. Losing just 5 to 10 pounds can help. When should you call for help? Call your doctor now or seek immediate medical care if:    · You have new or different belly pain.     · Your stools are black and tarlike or have streaks of blood.    Watch closely for changes in your health, and be sure to contact your doctor if:    · Your symptoms have not improved after 2 days.     · Food seems to catch in your throat or chest.   Where can you learn more? Go to http://gilda-michael.info/.   Enter I029 in the search box to learn more about \"Gastroesophageal Reflux Disease (GERD): Care Instructions. \"  Current as of: November 7, 2018  Content Version: 12.2  © 5394-1167 [employer]. Care instructions adapted under license by [employer] (which disclaims liability or warranty for this information). If you have questions about a medical condition or this instruction, always ask your healthcare professional. [place] [address] any warranty or liability for your use of this information.

## 2019-11-21 NOTE — PROGRESS NOTES
Margret Law is a 68 y.o. female (: 1945) presenting to address:    Chief Complaint   Patient presents with    Cough     chronic cough when only when laying down for last 5 weeks     Foot Pain     left    GERD      no longer covering Dexilant       Vitals:    19 0921   BP: 105/64   Pulse: 78   Resp: 16   Temp: 96.1 °F (35.6 °C)   TempSrc: Oral   SpO2: 100%   Weight: 158 lb (71.7 kg)   Height: 5' 7\" (1.702 m)   PainSc:   5   PainLoc: Foot       Hearing/Vision:   No exam data present    Learning Assessment:     Learning Assessment 2/3/2015   PRIMARY LEARNER Patient   HIGHEST LEVEL OF EDUCATION - PRIMARY LEARNER  4 YEARS OF COLLEGE   BARRIERS PRIMARY LEARNER NONE   CO-LEARNER CAREGIVER No   PRIMARY LANGUAGE ENGLISH   LEARNER PREFERENCE PRIMARY LISTENING   ANSWERED BY self   RELATIONSHIP SELF     Depression Screening:     3 most recent PHQ Screens 2017   Little interest or pleasure in doing things Not at all   Feeling down, depressed, irritable, or hopeless Not at all   Total Score PHQ 2 0     Fall Risk Assessment:     Fall Risk Assessment, last 12 mths 2019   Able to walk? Yes   Fall in past 12 months? Yes   Fall with injury? Yes   Number of falls in past 12 months 1   Fall Risk Score 2     Abuse Screening:     Abuse Screening Questionnaire 2017   Do you ever feel afraid of your partner? N   Are you in a relationship with someone who physically or mentally threatens you? N   Is it safe for you to go home? Y     Coordination of Care Questionaire:   1. Have you been to the ER, urgent care clinic since your last visit? Hospitalized since your last visit? NO    2. Have you seen or consulted any other health care providers outside of the [address] [place] [address] since your last visit? Include any pap smears or colon screening. Yes ENT     Advanced Directive:   1. Do you have an Advanced Directive? No     2. Would you like information on Advanced Directives?  NO

## 2019-11-21 NOTE — PROGRESS NOTES
Assessment/Plan:    *Diagnoses and all orders for this visit:    1. Essential hypertension    2. Venous insufficiency    3. Pure hypercholesterolemia    4. Chronic pain of multiple joints    5. Gastroesophageal reflux disease without esophagitis  -     pantoprazole (PROTONIX) 40 mg tablet; Take 1 Tab by mouth daily. 6. Encounter for immunization  -     PNEUMOCOCCAL CONJ VACCINE 13 VALENT IM  -     ADMIN PNEUMOCOCCAL VACCINE    Other orders  -     lidocaine (XYLOCAINE) 5 % ointment; Apply to affected area three times daily as needed  -     candesartan (ATACAND) 8 mg tablet; Take 1 Tab by mouth daily. F/u 8 weeks for HTN. Will stop Lisinopril and start Atacand 8 mg. Will change her Dexilant (not covered) to Protonix. The plan was discussed with the patient. The patient verbalized understanding and is in agreement with the plan. All medication potential side effects were discussed with the patient.    -------------------------------------------------------------------------------------------------------------------        Maxwell Castro is a 68 y.o. female and presents with Cough (chronic cough when only when laying down for last 5 weeks ); Foot Pain (left); and GERD ( no longer covering Dexilant)         Subjective:  Pt here for f/u of HTN:  We started her on Lisinopril but now, she has developed a cough. Leg edema from venous insufficiency is controlled and better. Multiple joint pains in small joints. She found some old Lidocaine ointment in a draw, tried this and it helped. Is asking for a new Rx. Reflux:  Responds very well to [address] [place] [address] but now insurance is not covering this. ROS:  Review of Systems - Negative except as above        The problem list was updated as a part of today's visit.   Patient Active Problem List   Diagnosis Code    Hyperlipidemia E78.5    Connective tissue disease ([facility] [address] M35.9    Depression F32.9    HH (hiatus hernia) K44.9    Gastroesophageal reflux disease K21.9    Rosacea L71.9    Capsulitis M77.9    Degeneration of intervertebral disc of cervical region M50.30    Allergy T78.40XA    Colitis, ischemic (HCC) K55.9    Anxiety associated with depression F41.8    Raynaud's syndrome I73.00    Hypothyroidism E03.9    Pulmonary nodules R91.8    Renal cyst N28.1    Hypovitaminosis D E55.9    Biliary dyskinesia K82.8    Osteoarthritis M19.90    Dyslipidemia E78.5    Classical migraine with intractable migraine G43.119    Rotator cuff tear arthropathy M75.100, M12.819    Lower gastrointestinal hemorrhage K92.2    Cervico-occipital neuralgia M54.81    History of cervical spinal surgery Z98.890    History of knee surgery Z98.890    Diarrhea R19.7    Osteopenia M85.80    Generalized arthritis M19.90    Essential hypertension I10       The PSH, FH were reviewed. SH:  Social History     Tobacco Use    Smoking status: Never Smoker    Smokeless tobacco: Never Used   Substance Use Topics    Alcohol use: No    Drug use: No       Medications/Allergies:  Current Outpatient Medications on File Prior to Visit   Medication Sig Dispense Refill    buPROPion (WELLBUTRIN) 100 mg tablet Take  by mouth daily.  DULoxetine (CYMBALTA) 30 mg capsule Take 30 mg by mouth daily.  atorvastatin (LIPITOR) 20 mg tablet Take  by mouth daily.  b complex vitamins (B COMPLEX 1) tablet Take 1 Tab by mouth daily.  ascorbic acid, vitamin C, (VITAMIN C) 500 mg tablet Take  by mouth daily.  levothyroxine (SYNTHROID) 50 mcg tablet TAKE 1 TABLET DAILY BEFORE BREAKFAST 90 Tab 1    verapamil ER (VERELAN) 360 mg ER capsule Take 1 Cap by mouth daily. 90 Cap 2    furosemide (LASIX) 20 mg tablet Take 1 Tab by mouth daily as needed (edema). 90 Tab 1    galcanezumab-gnlm (EMGALITY SYRINGE) 120 mg/mL syrg by SubCUTAneous route.  naratriptan (AMERGE) 2.5 mg tab Take 2.5 mg by mouth once as needed.       divalproex ER (DEPAKOTE ER) 500 mg ER tablet 500 mg daily. 1    calcium-cholecalciferol, d3, (CALCIUM 600 + D) 600-125 mg-unit tab Take  by mouth two (2) times a day.  coenzyme q10 (CHEW Q) 100 mg chew Take 200 mg by mouth daily.  multivitamin (ONE A DAY) tablet Take 1 Tab by mouth daily.  magnesium Tab Take  by mouth two (2) times a day.  [DISCONTINUED] lisinopril (PRINIVIL, ZESTRIL) 10 mg tablet TAKE ONE TABLET BY MOUTH DAILY 90 Tab 2    [DISCONTINUED] dexlansoprazole (DEXILANT) 60 mg CpDB capsule (delayed release) TAKE 1 CAPSULE DAILY 90 Cap 1    [DISCONTINUED] tretinoin (RETIN-A) 0.025 % topical cream Apply  to affected area nightly. No current facility-administered medications on file prior to visit. Allergies   Allergen Reactions    Ace Inhibitors Cough    Pcn [Penicillins] Rash     Was tested is no longer allergic         Health Maintenance:   Health Maintenance   Topic Date Due    Shingrix Vaccine Age 49> (1 of 2) 11/27/1995    GLAUCOMA SCREENING Q2Y  07/07/2012    MEDICARE YEARLY EXAM  06/19/2020    BREAST CANCER SCRN MAMMOGRAM  09/12/2021    COLONOSCOPY  07/07/2022    DTaP/Tdap/Td series (4 - Td) 04/22/2026    Hepatitis C Screening  Completed    Bone Densitometry (Dexa) Screening  Completed    Influenza Age 5 to Adult  Completed    Pneumococcal 65+ years  Completed       Objective:  Visit Vitals  /64   Pulse 78   Temp 96.1 °F (35.6 °C) (Oral)   Resp 16   Ht 5' 7\" (1.702 m)   Wt 158 lb (71.7 kg)   LMP  (LMP Unknown)   SpO2 100%   BMI 24.75 kg/m²          Nurses notes and VS reviewed.       Physical Examination: General appearance - alert, well appearing, and in no distress  Chest - clear to auscultation, no wheezes, rales or rhonchi, symmetric air entry  Heart - normal rate, regular rhythm, normal S1, S2, no murmurs, rubs, clicks or gallops          Labwork and Ancillary Studies:    CBC w/Diff  Lab Results   Component Value Date/Time    WBC 5.4 05/01/2019 11:49 AM    HGB 14.3 05/01/2019 11:49 AM    PLATELET 872 43/12/3855 11:49 AM         Basic Metabolic Profile  Lab Results   Component Value Date/Time    Sodium 141 05/01/2019 11:49 AM    Potassium 4.4 05/01/2019 11:49 AM    Chloride 106 05/01/2019 11:49 AM    CO2 28 05/01/2019 11:49 AM    Anion gap 7 05/01/2019 11:49 AM    Glucose 88 05/01/2019 11:49 AM    BUN 30 (H) 05/01/2019 11:49 AM    Creatinine 0.88 05/01/2019 11:49 AM    BUN/Creatinine ratio 34 (H) 05/01/2019 11:49 AM    GFR est AA >60 05/01/2019 11:49 AM    GFR est non-AA >60 05/01/2019 11:49 AM    Calcium 9.0 05/01/2019 11:49 AM         LFT  Lab Results   Component Value Date/Time    ALT (SGPT) 18 05/01/2019 11:49 AM    AST (SGOT) 14 (L) 05/01/2019 11:49 AM    Alk.  phosphatase 88 05/01/2019 11:49 AM    Bilirubin, direct 0.1 05/01/2019 11:49 AM    Bilirubin, total 0.5 05/01/2019 11:49 AM         Cholesterol  Lab Results   Component Value Date/Time    Cholesterol, total 140 06/19/2019 02:07 PM    HDL Cholesterol 65 (H) 06/19/2019 02:07 PM    LDL, calculated 62.8 06/19/2019 02:07 PM    Triglyceride 61 06/19/2019 02:07 PM    CHOL/HDL Ratio 2.2 06/19/2019 02:07 PM

## 2020-01-21 ENCOUNTER — OFFICE VISIT (OUTPATIENT)
Dept: FAMILY MEDICINE CLINIC | Age: 75
End: 2020-01-21

## 2020-01-21 VITALS
WEIGHT: 165 LBS | TEMPERATURE: 97.6 F | SYSTOLIC BLOOD PRESSURE: 126 MMHG | OXYGEN SATURATION: 98 % | BODY MASS INDEX: 25.9 KG/M2 | HEART RATE: 76 BPM | RESPIRATION RATE: 16 BRPM | HEIGHT: 67 IN | DIASTOLIC BLOOD PRESSURE: 85 MMHG

## 2020-01-21 DIAGNOSIS — K21.9 GASTROESOPHAGEAL REFLUX DISEASE WITHOUT ESOPHAGITIS: ICD-10-CM

## 2020-01-21 DIAGNOSIS — E55.9 HYPOVITAMINOSIS D: ICD-10-CM

## 2020-01-21 DIAGNOSIS — I10 ESSENTIAL HYPERTENSION: Primary | ICD-10-CM

## 2020-01-21 DIAGNOSIS — R04.0 FREQUENT EPISTAXIS: ICD-10-CM

## 2020-01-21 DIAGNOSIS — R09.81 SINUS CONGESTION: ICD-10-CM

## 2020-01-21 RX ORDER — DEXLANSOPRAZOLE 60 MG/1
CAPSULE, DELAYED RELEASE ORAL DAILY
COMMUNITY

## 2020-01-21 NOTE — PROGRESS NOTES
Quyen Jones is a 76 y.o. female (: 1945) presenting to address:    Chief Complaint   Patient presents with    Hypertension    Joint Pain     shoulder ,elbow ,knees , hands . Vitals:    20 1109   BP: 126/85   Pulse: 76   Resp: 16   Temp: 97.6 °F (36.4 °C)   TempSrc: Oral   SpO2: 98%   Weight: 165 lb (74.8 kg)   Height: 5' 7\" (1.702 m)   PainSc:   5   PainLoc: Ankle       Hearing/Vision:   No exam data present    Learning Assessment:     Learning Assessment 2/3/2015   PRIMARY LEARNER Patient   HIGHEST LEVEL OF EDUCATION - PRIMARY LEARNER  4 YEARS OF COLLEGE   BARRIERS PRIMARY LEARNER NONE   CO-LEARNER CAREGIVER No   PRIMARY LANGUAGE ENGLISH   LEARNER PREFERENCE PRIMARY LISTENING   ANSWERED BY self   RELATIONSHIP SELF     Depression Screening:     3 most recent PHQ Screens 2017   Little interest or pleasure in doing things Not at all   Feeling down, depressed, irritable, or hopeless Not at all   Total Score PHQ 2 0     Fall Risk Assessment:     Fall Risk Assessment, last 12 mths 2019   Able to walk? Yes   Fall in past 12 months? Yes   Fall with injury? Yes   Number of falls in past 12 months 1   Fall Risk Score 2     Abuse Screening:     Abuse Screening Questionnaire 2017   Do you ever feel afraid of your partner? N   Are you in a relationship with someone who physically or mentally threatens you? N   Is it safe for you to go home? Y     Coordination of Care Questionaire:   1. Have you been to the ER, urgent care clinic since your last visit? Hospitalized since your last visit? NO    2. Have you seen or consulted any other health care providers outside of the [address] [place] [address] since your last visit? Include any pap smears or colon screening. NO    Advanced Directive:   1. Do you have an Advanced Directive? NO    2. Would you like information on Advanced Directives?  NO

## 2020-01-21 NOTE — PATIENT INSTRUCTIONS
High Blood Pressure: Care Instructions Overview It's normal for blood pressure to go up and down throughout the day. But if it stays up, you have high blood pressure. Another name for high blood pressure is hypertension. Despite what a lot of people think, high blood pressure usually doesn't cause headaches or make you feel dizzy or lightheaded. It usually has no symptoms. But it does increase your risk of stroke, heart attack, and other problems. You and your doctor will talk about your risks of these problems based on your blood pressure. Your doctor will give you a goal for your blood pressure. Your goal will be based on your health and your age. Lifestyle changes, such as eating healthy and being active, are always important to help lower blood pressure. You might also take medicine to reach your blood pressure goal. 
Follow-up care is a key part of your treatment and safety. Be sure to make and go to all appointments, and call your doctor if you are having problems. It's also a good idea to know your test results and keep a list of the medicines you take. How can you care for yourself at home? Medical treatment · If you stop taking your medicine, your blood pressure will go back up. You may take one or more types of medicine to lower your blood pressure. Be safe with medicines. Take your medicine exactly as prescribed. Call your doctor if you think you are having a problem with your medicine. · Talk to your doctor before you start taking aspirin every day. Aspirin can help certain people lower their risk of a heart attack or stroke. But taking aspirin isn't right for everyone, because it can cause serious bleeding. · See your doctor regularly. You may need to see the doctor more often at first or until your blood pressure comes down. · If you are taking blood pressure medicine, talk to your doctor before you take decongestants or anti-inflammatory medicine, such as ibuprofen. Some of these medicines can raise blood pressure. · Learn how to check your blood pressure at home. Lifestyle changes · Stay at a healthy weight. This is especially important if you put on weight around the waist. Losing even 10 pounds can help you lower your blood pressure. · If your doctor recommends it, get more exercise. Walking is a good choice. Bit by bit, increase the amount you walk every day. Try for at least 30 minutes on most days of the week. You also may want to swim, bike, or do other activities. · Avoid or limit alcohol. Talk to your doctor about whether you can drink any alcohol. · Try to limit how much sodium you eat to less than 2,300 milligrams (mg) a day. Your doctor may ask you to try to eat less than 1,500 mg a day. · Eat plenty of fruits (such as bananas and oranges), vegetables, legumes, whole grains, and low-fat dairy products. · Lower the amount of saturated fat in your diet. Saturated fat is found in animal products such as milk, cheese, and meat. Limiting these foods may help you lose weight and also lower your risk for heart disease. · Do not smoke. Smoking increases your risk for heart attack and stroke. If you need help quitting, talk to your doctor about stop-smoking programs and medicines. These can increase your chances of quitting for good. When should you call for help? Call  911 anytime you think you may need emergency care. This may mean having symptoms that suggest that your blood pressure is causing a serious heart or blood vessel problem. Your blood pressure may be over 180/120. 
 For example, call  911 if: 
  · You have symptoms of a heart attack. These may include: 
? Chest pain or pressure, or a strange feeling in the chest. 
? Sweating. ? Shortness of breath. ? Nausea or vomiting. ? Pain, pressure, or a strange feeling in the back, neck, jaw, or upper belly or in one or both shoulders or arms. ? Lightheadedness or sudden weakness. ? A fast or irregular heartbeat.  
  · You have symptoms of a stroke. These may include: 
? Sudden numbness, tingling, weakness, or loss of movement in your face, arm, or leg, especially on only one side of your body. ? Sudden vision changes. ? Sudden trouble speaking. ? Sudden confusion or trouble understanding simple statements. ? Sudden problems with walking or balance. ? A sudden, severe headache that is different from past headaches.  
  · You have severe back or belly pain.  
 Do not wait until your blood pressure comes down on its own. Get help right away. 
 Call your doctor now or seek immediate care if: 
  · Your blood pressure is much higher than normal (such as 180/120 or higher), but you don't have symptoms.  
  · You think high blood pressure is causing symptoms, such as: 
? Severe headache. 
? Blurry vision.  
 Watch closely for changes in your health, and be sure to contact your doctor if: 
  · Your blood pressure measures higher than your doctor recommends at least 2 times. That means the top number is higher or the bottom number is higher, or both.  
  · You think you may be having side effects from your blood pressure medicine. Where can you learn more? Go to http://gilda-michael.info/. Enter B390 in the search box to learn more about \"High Blood Pressure: Care Instructions. \" Current as of: April 9, 2019 Content Version: 12.2 © 6012-9552 [employer]. Care instructions adapted under license by [employer] (which disclaims liability or warranty for this information). If you have questions about a medical condition or this instruction, always ask your healthcare professional. [place] [address] any warranty or liability for your use of this information.

## 2020-01-21 NOTE — PROGRESS NOTES
Assessment/Plan:    *Diagnoses and all orders for this visit:    1. Essential hypertension  -     CBC WITH AUTOMATED DIFF; Future  -     HEPATIC FUNCTION PANEL; Future  -     LIPID PANEL; Future  -     METABOLIC PANEL, BASIC; Future  -     TSH 3RD GENERATION; Future  -     T4, FREE; Future  -     URINALYSIS W/ RFLX MICROSCOPIC; Future    2. Gastroesophageal reflux disease without esophagitis    3. Sinus congestion  -     REFERRAL TO ENT-OTOLARYNGOLOGY    4. Frequent epistaxis  -     REFERRAL TO ENT-OTOLARYNGOLOGY    5. Hypovitaminosis D  -     VITAMIN D, 25 HYDROXY; Future        The plan was discussed with the patient. The patient verbalized understanding and is in agreement with the plan. All medication potential side effects were discussed with the patient.    -------------------------------------------------------------------------------------------------------------------        Grady Garcia is a 76 y.o. female and presents with Hypertension and Joint Pain (shoulder ,elbow ,knees , hands . )         Subjective:  Pt here for f/u. She has had issues with her sinuses. Had a bleeding nose, went to Dr. Hilaria Marroquin. Treated with silver nitrate but still as the problem. Pt asking for a new ENT to see. HTN:  We changed her BP med to Atacand last visit and this is working well for her. Review of Systems - General ROS: negative         The problem list was updated as a part of today's visit.   Patient Active Problem List   Diagnosis Code    Hyperlipidemia E78.5    Connective tissue disease ([facility] [address] M35.9    Depression F32.9    HH (hiatus hernia) K44.9    Gastroesophageal reflux disease K21.9    Rosacea L71.9    Capsulitis M77.9    Degeneration of intervertebral disc of cervical region M50.30    Allergy T78.40XA    Colitis, ischemic ([facility] [address] K55.9    Anxiety associated with depression F41.8    Raynaud's syndrome I73.00    Hypothyroidism E03.9    Pulmonary nodules R91.8    Renal cyst N28.1    Hypovitaminosis D E55.9    Biliary dyskinesia K82.8    Osteoarthritis M19.90    Dyslipidemia E78.5    Classical migraine with intractable migraine G43.119    Rotator cuff tear arthropathy M75.100, M12.819    Lower gastrointestinal hemorrhage K92.2    Cervico-occipital neuralgia M54.81    History of cervical spinal surgery Z98.890    History of knee surgery Z98.890    Diarrhea R19.7    Osteopenia M85.80    Generalized arthritis M19.90    Essential hypertension I10       The PSH, FH were reviewed. SH:  Social History     Tobacco Use    Smoking status: Never Smoker    Smokeless tobacco: Never Used   Substance Use Topics    Alcohol use: No    Drug use: No       Medications/Allergies:  Current Outpatient Medications on File Prior to Visit   Medication Sig Dispense Refill    dexlansoprazole (DEXILANT) 60 mg CpDB capsule (delayed release) Take  by mouth daily.  furosemide (LASIX) 20 mg tablet TAKE ONE TABLET BY MOUTH DAILY AS NEEDED FOR SWELLING 90 Tab 1    buPROPion (WELLBUTRIN) 100 mg tablet Take  by mouth daily.  DULoxetine (CYMBALTA) 30 mg capsule Take 30 mg by mouth daily.  atorvastatin (LIPITOR) 20 mg tablet Take  by mouth daily.  b complex vitamins (B COMPLEX 1) tablet Take 1 Tab by mouth daily.  ascorbic acid, vitamin C, (VITAMIN C) 500 mg tablet Take  by mouth daily.  lidocaine (XYLOCAINE) 5 % ointment Apply to affected area three times daily as needed 50 g 2    candesartan (ATACAND) 8 mg tablet Take 1 Tab by mouth daily. 90 Tab 1    levothyroxine (SYNTHROID) 50 mcg tablet TAKE 1 TABLET DAILY BEFORE BREAKFAST 90 Tab 1    verapamil ER (VERELAN) 360 mg ER capsule Take 1 Cap by mouth daily. 90 Cap 2    galcanezumab-gnlm (EMGALITY SYRINGE) 120 mg/mL syrg by SubCUTAneous route.  naratriptan (AMERGE) 2.5 mg tab Take 2.5 mg by mouth once as needed.  divalproex ER (DEPAKOTE ER) 500 mg ER tablet 500 mg daily.   1    calcium-cholecalciferol, d3, (CALCIUM 600 + D) 600-125 mg-unit tab Take  by mouth two (2) times a day.  coenzyme q10 (CHEW Q) 100 mg chew Take 200 mg by mouth daily.  multivitamin (ONE A DAY) tablet Take 1 Tab by mouth daily.  magnesium Tab Take  by mouth two (2) times a day.  pantoprazole (PROTONIX) 40 mg tablet Take 1 Tab by mouth daily. 90 Tab 1     No current facility-administered medications on file prior to visit. Allergies   Allergen Reactions    Ace Inhibitors Cough         Health Maintenance:   Health Maintenance   Topic Date Due    Shingrix Vaccine Age 49> (1 of 2) 11/27/1995    GLAUCOMA SCREENING Q2Y  07/07/2012    MEDICARE YEARLY EXAM  06/19/2020    BREAST CANCER SCRN MAMMOGRAM  09/12/2021    COLONOSCOPY  07/07/2022    DTaP/Tdap/Td series (4 - Td) 04/22/2026    Hepatitis C Screening  Completed    Bone Densitometry (Dexa) Screening  Completed    Influenza Age 5 to Adult  Completed    Pneumococcal 65+ years  Completed       Objective:  Visit Vitals  /85   Pulse 76   Temp 97.6 °F (36.4 °C) (Oral)   Resp 16   Ht 5' 7\" (1.702 m)   Wt 165 lb (74.8 kg)   LMP  (LMP Unknown)   SpO2 98%   BMI 25.84 kg/m²          Nurses notes and VS reviewed.       Physical Examination: General appearance - alert, well appearing, and in no distress  Chest - clear to auscultation, no wheezes, rales or rhonchi, symmetric air entry  Heart - normal rate, regular rhythm, normal S1, S2, no murmurs, rubs, clicks or gallops          Labwork and Ancillary Studies:    CBC w/Diff  Lab Results   Component Value Date/Time    WBC 5.4 05/01/2019 11:49 AM    HGB 14.3 05/01/2019 11:49 AM    PLATELET 242 45/75/2292 11:49 AM         Basic Metabolic Profile  Lab Results   Component Value Date/Time    Sodium 141 05/01/2019 11:49 AM    Potassium 4.4 05/01/2019 11:49 AM    Chloride 106 05/01/2019 11:49 AM    CO2 28 05/01/2019 11:49 AM    Anion gap 7 05/01/2019 11:49 AM    Glucose 88 05/01/2019 11:49 AM    BUN 30 (H) 05/01/2019 11:49 AM Creatinine 0.88 05/01/2019 11:49 AM    BUN/Creatinine ratio 34 (H) 05/01/2019 11:49 AM    GFR est AA >60 05/01/2019 11:49 AM    GFR est non-AA >60 05/01/2019 11:49 AM    Calcium 9.0 05/01/2019 11:49 AM         LFT  Lab Results   Component Value Date/Time    ALT (SGPT) 18 05/01/2019 11:49 AM    AST (SGOT) 14 (L) 05/01/2019 11:49 AM    Alk.  phosphatase 88 05/01/2019 11:49 AM    Bilirubin, direct 0.1 05/01/2019 11:49 AM    Bilirubin, total 0.5 05/01/2019 11:49 AM         Cholesterol  Lab Results   Component Value Date/Time    Cholesterol, total 140 06/19/2019 02:07 PM    HDL Cholesterol 65 (H) 06/19/2019 02:07 PM    LDL, calculated 62.8 06/19/2019 02:07 PM    Triglyceride 61 06/19/2019 02:07 PM    CHOL/HDL Ratio 2.2 06/19/2019 02:07 PM

## 2020-02-18 ENCOUNTER — OFFICE VISIT (OUTPATIENT)
Dept: FAMILY MEDICINE CLINIC | Age: 75
End: 2020-02-18

## 2020-02-18 VITALS
DIASTOLIC BLOOD PRESSURE: 71 MMHG | HEIGHT: 67 IN | HEART RATE: 71 BPM | SYSTOLIC BLOOD PRESSURE: 103 MMHG | TEMPERATURE: 98.1 F | OXYGEN SATURATION: 100 % | RESPIRATION RATE: 20 BRPM | WEIGHT: 164 LBS | BODY MASS INDEX: 25.74 KG/M2

## 2020-02-18 DIAGNOSIS — R07.81 RIB PAIN ON RIGHT SIDE: ICD-10-CM

## 2020-02-18 DIAGNOSIS — J06.9 UPPER RESPIRATORY TRACT INFECTION, UNSPECIFIED TYPE: Primary | ICD-10-CM

## 2020-02-18 RX ORDER — AMOXICILLIN AND CLAVULANATE POTASSIUM 875; 125 MG/1; MG/1
1 TABLET, FILM COATED ORAL 2 TIMES DAILY
Qty: 20 TAB | Refills: 0 | Status: SHIPPED | OUTPATIENT
Start: 2020-02-18 | End: 2020-02-28

## 2020-02-18 RX ORDER — ALBUTEROL SULFATE 90 UG/1
2 AEROSOL, METERED RESPIRATORY (INHALATION)
Qty: 1 INHALER | Refills: 0 | Status: SHIPPED | OUTPATIENT
Start: 2020-02-18 | End: 2020-08-31 | Stop reason: ALTCHOICE

## 2020-02-18 NOTE — PROGRESS NOTES
Cari Silvestre is a 76 y.o. female (: 1945) presenting to address:    Chief Complaint   Patient presents with    Cough    Fall     a few weeks ago     Rib Pain     right side hurts after fall 10 days ago . Vitals:    20 0747   BP: 103/71   Pulse: 71   Resp: 20   Temp: 98.1 °F (36.7 °C)   TempSrc: Oral   SpO2: 100%   Weight: 164 lb (74.4 kg)   Height: 5' 7\" (1.702 m)   PainSc:   5   PainLoc: Rib Cage       Hearing/Vision:   No exam data present    Learning Assessment:     Learning Assessment 2/3/2015   PRIMARY LEARNER Patient   HIGHEST LEVEL OF EDUCATION - PRIMARY LEARNER  4 YEARS OF COLLEGE   BARRIERS PRIMARY LEARNER NONE   CO-LEARNER CAREGIVER No   PRIMARY LANGUAGE ENGLISH   LEARNER PREFERENCE PRIMARY LISTENING   ANSWERED BY self   RELATIONSHIP SELF     Depression Screening:     3 most recent PHQ Screens 2017   Little interest or pleasure in doing things Not at all   Feeling down, depressed, irritable, or hopeless Not at all   Total Score PHQ 2 0     Fall Risk Assessment:     Fall Risk Assessment, last 12 mths 2019   Able to walk? Yes   Fall in past 12 months? Yes   Fall with injury? Yes   Number of falls in past 12 months 1   Fall Risk Score 2     Abuse Screening:     Abuse Screening Questionnaire 2017   Do you ever feel afraid of your partner? N   Are you in a relationship with someone who physically or mentally threatens you? N   Is it safe for you to go home? Y     Coordination of Care Questionaire:   1. Have you been to the ER, urgent care clinic since your last visit? Hospitalized since your last visit? NO    2. Have you seen or consulted any other health care providers outside of the [address] [place] [address] since your last visit? Include any pap smears or colon screening. NO    Advanced Directive:   1. Do you have an Advanced Directive? NO    2. Would you like information on Advanced Directives?  NO

## 2020-02-18 NOTE — PATIENT INSTRUCTIONS

## 2020-02-18 NOTE — PROGRESS NOTES
Assessment/Plan:    *Diagnoses and all orders for this visit:    1. Upper respiratory tract infection, unspecified type  -     amoxicillin-clavulanate (AUGMENTIN) 875-125 mg per tablet; Take 1 Tab by mouth two (2) times a day for 10 days. -     albuterol (PROVENTIL HFA, VENTOLIN HFA, PROAIR HFA) 90 mcg/actuation inhaler; Take 2 Puffs by inhalation every eight (8) hours as needed for Wheezing. 2. Rib pain on right side  -     XR RIBS RT UNI 2 V; Future        Pt will call if not better. The plan was discussed with the patient. The patient verbalized understanding and is in agreement with the plan. All medication potential side effects were discussed with the patient.    -------------------------------------------------------------------------------------------------------------------        Merritt Gosselin is a 76 y.o. female and presents with Cough; Fall (a few weeks ago ); and Rib Pain (right side hurts after fall 10 days ago . )         Subjective:  Pt here for a few things. Had a fall at her home, fell on some steps and hit the RT side of the rib cage. Has just been watching it, has been painful ever since. Has been dealing with a cold x 1 week, + cough, productive of dark phlegm, no fevers. Has been taking some OTC cough syrup. Review of Systems - ENT ROS: positive for - sore throat  Respiratory ROS: positive for - cough and sputum changes  Cardiovascular ROS: no chest pain or dyspnea on exertion  Gastrointestinal ROS: no abdominal pain, change in bowel habits, or black or bloody stools  Musculoskeletal ROS: negative         The problem list was updated as a part of today's visit.   Patient Active Problem List   Diagnosis Code    Hyperlipidemia E78.5    Connective tissue disease ([facility] [address] M35.9    Depression F32.9    HH (hiatus hernia) K44.9    Gastroesophageal reflux disease K21.9    Rosacea L71.9    Capsulitis M77.9    Degeneration of intervertebral disc of cervical region M50.30  Allergy T78.40XA    Colitis, ischemic (H[facility]) K55.9    Anxiety associated with depression F41.8    Raynaud's syndrome I73.00    Hypothyroidism E03.9    Pulmonary nodules R91.8    Renal cyst N28.1    Hypovitaminosis D E55.9    Biliary dyskinesia K82.8    Osteoarthritis M19.90    Dyslipidemia E78.5    Classical migraine with intractable migraine G43.119    Rotator cuff tear arthropathy M75.100, M12.819    Lower gastrointestinal hemorrhage K92.2    Cervico-occipital neuralgia M54.81    History of cervical spinal surgery Z98.890    History of knee surgery Z98.890    Diarrhea R19.7    Osteopenia M85.80    Generalized arthritis M19.90    Essential hypertension I10       The PSH, FH were reviewed. SH:  Social History     Tobacco Use    Smoking status: Never Smoker    Smokeless tobacco: Never Used   Substance Use Topics    Alcohol use: No    Drug use: No       Medications/Allergies:  Current Outpatient Medications on File Prior to Visit   Medication Sig Dispense Refill    dexlansoprazole (DEXILANT) 60 mg CpDB capsule (delayed release) Take  by mouth daily.  furosemide (LASIX) 20 mg tablet TAKE ONE TABLET BY MOUTH DAILY AS NEEDED FOR SWELLING 90 Tab 1    buPROPion (WELLBUTRIN) 100 mg tablet Take  by mouth daily.  DULoxetine (CYMBALTA) 30 mg capsule Take 30 mg by mouth daily.  atorvastatin (LIPITOR) 20 mg tablet Take  by mouth daily.  b complex vitamins (B COMPLEX 1) tablet Take 1 Tab by mouth daily.  ascorbic acid, vitamin C, (VITAMIN C) 500 mg tablet Take  by mouth daily.  lidocaine (XYLOCAINE) 5 % ointment Apply to affected area three times daily as needed 50 g 2    candesartan (ATACAND) 8 mg tablet Take 1 Tab by mouth daily. 90 Tab 1    levothyroxine (SYNTHROID) 50 mcg tablet TAKE 1 TABLET DAILY BEFORE BREAKFAST 90 Tab 1    verapamil ER (VERELAN) 360 mg ER capsule Take 1 Cap by mouth daily.  90 Cap 2    galcanezumab-gnlm (EMGALITY SYRINGE) 120 mg/mL syrg by SubCUTAneous route.  naratriptan (AMERGE) 2.5 mg tab Take 2.5 mg by mouth once as needed.  divalproex ER (DEPAKOTE ER) 500 mg ER tablet 500 mg daily. 1    calcium-cholecalciferol, d3, (CALCIUM 600 + D) 600-125 mg-unit tab Take  by mouth two (2) times a day.  coenzyme q10 (CHEW Q) 100 mg chew Take 200 mg by mouth daily.  multivitamin (ONE A DAY) tablet Take 1 Tab by mouth daily.  magnesium Tab Take  by mouth two (2) times a day. No current facility-administered medications on file prior to visit. Allergies   Allergen Reactions    Ace Inhibitors Cough         Health Maintenance:   Health Maintenance   Topic Date Due    GLAUCOMA SCREENING Q2Y  07/07/2012    Shingrix Vaccine Age 50> (2 of 2) 01/16/2020    Medicare Yearly Exam  06/19/2020    Lipid Screen  06/19/2020    Breast Cancer Screen Mammogram  09/12/2021    Colonoscopy  07/07/2022    DTaP/Tdap/Td series (4 - Td) 04/22/2026    Hepatitis C Screening  Completed    Bone Densitometry (Dexa) Screening  Completed    Influenza Age 5 to Adult  Completed    Pneumococcal 65+ years  Completed       Objective:  Visit Vitals  /71   Pulse 71   Temp 98.1 °F (36.7 °C) (Oral)   Resp 20   Ht 5' 7\" (1.702 m)   Wt 164 lb (74.4 kg)   LMP  (LMP Unknown)   SpO2 100%   BMI 25.69 kg/m²          Nurses notes and VS reviewed.       Physical Examination: General appearance - ill-appearing  Ears - bilateral TM's and external ear canals normal  Mouth - erythematous  Neck - supple, no significant adenopathy  Chest - rhonchi noted scattered  Heart - normal rate, regular rhythm, normal S1, S2, no murmurs, rubs, clicks or gallops          Labwork and Ancillary Studies:    CBC w/Diff  Lab Results   Component Value Date/Time    WBC 5.4 05/01/2019 11:49 AM    HGB 14.3 05/01/2019 11:49 AM    PLATELET 968 75/68/9473 11:49 AM         Basic Metabolic Profile  Lab Results   Component Value Date/Time    Sodium 141 05/01/2019 11:49 AM    Potassium 4.4 05/01/2019 11:49 AM    Chloride 106 05/01/2019 11:49 AM    CO2 28 05/01/2019 11:49 AM    Anion gap 7 05/01/2019 11:49 AM    Glucose 88 05/01/2019 11:49 AM    BUN 30 (H) 05/01/2019 11:49 AM    Creatinine 0.88 05/01/2019 11:49 AM    BUN/Creatinine ratio 34 (H) 05/01/2019 11:49 AM    GFR est AA >60 05/01/2019 11:49 AM    GFR est non-AA >60 05/01/2019 11:49 AM    Calcium 9.0 05/01/2019 11:49 AM         LFT  Lab Results   Component Value Date/Time    ALT (SGPT) 18 05/01/2019 11:49 AM    AST (SGOT) 14 (L) 05/01/2019 11:49 AM    Alk.  phosphatase 88 05/01/2019 11:49 AM    Bilirubin, direct 0.1 05/01/2019 11:49 AM    Bilirubin, total 0.5 05/01/2019 11:49 AM         Cholesterol  Lab Results   Component Value Date/Time    Cholesterol, total 140 06/19/2019 02:07 PM    HDL Cholesterol 65 (H) 06/19/2019 02:07 PM    LDL, calculated 62.8 06/19/2019 02:07 PM    Triglyceride 61 06/19/2019 02:07 PM    CHOL/HDL Ratio 2.2 06/19/2019 02:07 PM

## 2020-03-30 ENCOUNTER — VIRTUAL VISIT (OUTPATIENT)
Dept: FAMILY MEDICINE CLINIC | Age: 75
End: 2020-03-30

## 2020-03-30 DIAGNOSIS — J02.9 PHARYNGITIS, UNSPECIFIED ETIOLOGY: Primary | ICD-10-CM

## 2020-03-30 NOTE — PROGRESS NOTES
Annalise Damian is a 76 y.o. female (: 1945) presenting to address:    Chief Complaint   Patient presents with    Sore Throat     since last night       There were no vitals filed for this visit. Hearing/Vision:   No exam data present    Learning Assessment:     Learning Assessment 2/3/2015   PRIMARY LEARNER Patient   HIGHEST LEVEL OF EDUCATION - PRIMARY LEARNER  4 YEARS OF COLLEGE   BARRIERS PRIMARY LEARNER NONE   CO-LEARNER CAREGIVER No   PRIMARY LANGUAGE ENGLISH   LEARNER PREFERENCE PRIMARY LISTENING   ANSWERED BY self   RELATIONSHIP SELF     Depression Screening:     3 most recent PHQ Screens 2017   Little interest or pleasure in doing things Not at all   Feeling down, depressed, irritable, or hopeless Not at all   Total Score PHQ 2 0     Fall Risk Assessment:     Fall Risk Assessment, last 12 mths 3/30/2020   Able to walk? Yes   Fall in past 12 months? No   Fall with injury? -   Number of falls in past 12 months -   Fall Risk Score -     Abuse Screening:     Abuse Screening Questionnaire 3/30/2020   Do you ever feel afraid of your partner? N   Are you in a relationship with someone who physically or mentally threatens you? N   Is it safe for you to go home? Y     Coordination of Care Questionaire:   1. Have you been to the ER, urgent care clinic since your last visit? Hospitalized since your last visit? NO    2. Have you seen or consulted any other health care providers outside of the [address] [place] [address] since your last visit? Include any pap smears or colon screening. YES, Jarett Traore    Advanced Directive:   1. Do you have an Advanced Directive? YES    2. Would you like information on Advanced Directives?  NO

## 2020-04-07 ENCOUNTER — TELEPHONE (OUTPATIENT)
Dept: FAMILY MEDICINE CLINIC | Age: 75
End: 2020-04-07

## 2020-04-07 NOTE — TELEPHONE ENCOUNTER
Patient is taking the antibiotics as proscribed but they are causing her to have horrible diarrhea and \"that is not going to work\" please call the patient to let her know what she can do.

## 2020-04-07 NOTE — TELEPHONE ENCOUNTER
Returned pt call. She said she went to [facility] ED for facial swelling. Was put on clindamycin 150 mg 3 caps tid x 10days. Today she has watery stool and is unhappy. Her face is looking better though. ED notes printed for pcp. Told pt we would call her back and continue on the meds as prescribed until she hears back from us.

## 2020-04-07 NOTE — TELEPHONE ENCOUNTER
Tell pt to only take 2 doses today and I will talk to her tomorrow morning during our virtual visit to give her further recommendations.

## 2020-04-08 ENCOUNTER — VIRTUAL VISIT (OUTPATIENT)
Dept: FAMILY MEDICINE CLINIC | Age: 75
End: 2020-04-08

## 2020-04-08 DIAGNOSIS — H04.303 DACROCYSTITIS, BILATERAL: Primary | ICD-10-CM

## 2020-04-08 DIAGNOSIS — K52.1 DRUG-INDUCED DIARRHEA: ICD-10-CM

## 2020-04-08 RX ORDER — SULFAMETHOXAZOLE AND TRIMETHOPRIM 800; 160 MG/1; MG/1
1 TABLET ORAL 2 TIMES DAILY
Qty: 14 TAB | Refills: 0 | Status: SHIPPED | OUTPATIENT
Start: 2020-04-08 | End: 2020-04-15

## 2020-04-08 RX ORDER — CLINDAMYCIN HYDROCHLORIDE 150 MG/1
300 CAPSULE ORAL 3 TIMES DAILY
COMMUNITY
End: 2020-08-31 | Stop reason: ALTCHOICE

## 2020-04-08 NOTE — PROGRESS NOTES
Consent: Gene Gatica, who was seen by synchronous (real-time) audio-video technology, and/or her healthcare decision maker, is aware that this patient-initiated, Telehealth encounter on 4/8/2020 is a billable service, with coverage as determined by her insurance carrier. She is aware that she may receive a bill and has provided verbal consent to proceed: Yes. Assessment & Plan:   Diagnoses and all orders for this visit:    1. Dacrocystitis, bilateral  -     trimethoprim-sulfamethoxazole (BACTRIM DS, SEPTRA DS) 160-800 mg per tablet; Take 1 Tab by mouth two (2) times a day for 7 days. Will stop clindamycin and start Bactrim. Advised pt to cut Atacand in half for the 7 days she is taking Bactrim due to potential interaction and bactrim is the next best Abx for this condition. Will call if she does not improve. 712  Subjective:   Gene Gatica is a 76 y.o. female who was seen for Facial Swelling      Pt was seen on a virtual visit today. Was seen in the ER a couple of days ago for facial swelling. They diagnosed dacryocystitis and placed her on Clindamycin. She has been having profuse watery diarrhea since then. Her facial swelling and redness is improving but she can not tolerate the Abx any longer. Prior to Admission medications    Medication Sig Start Date End Date Taking? Authorizing Provider   clindamycin (CLEOCIN) 150 mg capsule Take 300 mg by mouth three (3) times daily. Yes Provider, Historical   trimethoprim-sulfamethoxazole (BACTRIM DS, SEPTRA DS) 160-800 mg per tablet Take 1 Tab by mouth two (2) times a day for 7 days. 4/8/20 4/15/20 Yes Amber Rosas MD   dexlansoprazole (DEXILANT) 60 mg CpDB capsule (delayed release) Take  by mouth daily. Yes Provider, Historical   furosemide (LASIX) 20 mg tablet TAKE ONE TABLET BY MOUTH DAILY AS NEEDED FOR SWELLING 12/4/19  Yes Amber Roass MD   buPROPion [facility]) 100 mg tablet Take  by mouth daily.    Yes Provider, Historical atorvastatin (LIPITOR) 20 mg tablet Take  by mouth daily. Yes Provider, Historical   b complex vitamins (B COMPLEX 1) tablet Take 1 Tab by mouth daily. Yes Provider, Historical   ascorbic acid, vitamin C, (VITAMIN C) 500 mg tablet Take  by mouth daily. Yes Provider, Historical   lidocaine (XYLOCAINE) 5 % ointment Apply to affected area three times daily as needed 11/21/19  Yes Suad Hence, MD   candesartan (ATACAND) 8 mg tablet Take 1 Tab by mouth daily. 11/21/19  Yes Suad Hence, MD   levothyroxine (SYNTHROID) 50 mcg tablet TAKE 1 TABLET DAILY BEFORE BREAKFAST 11/12/19  Yes Suad Hence, MD   verapamil ER (VERELAN) 360 mg ER capsule Take 1 Cap by mouth daily. 6/19/19  Yes Suad Hence, MD   galcanezumab-[facility] SYRINGE) 120 mg/mL syrg by SubCUTAneous route. Yes Provider, Historical   naratriptan (AMERGE) 2.5 mg tab Take 2.5 mg by mouth once as needed. Yes Provider, Historical   calcium-cholecalciferol, d3, (CALCIUM 600 + D) 600-125 mg-unit tab Take  by mouth two (2) times a day. Yes Provider, Historical   coenzyme q10 (CHEW Q) 100 mg chew Take 200 mg by mouth daily. Yes Provider, Historical   multivitamin (ONE A DAY) tablet Take 1 Tab by mouth daily. Yes Provider, Historical   magnesium Tab Take  by mouth two (2) times a day. 9/27/10  Yes Provider, Historical   albuterol (PROVENTIL HFA, VENTOLIN HFA, PROAIR HFA) 90 mcg/actuation inhaler Take 2 Puffs by inhalation every eight (8) hours as needed for Wheezing. 2/18/20   Suad Hence, MD   DULoxetine (CYMBALTA) 30 mg capsule Take 30 mg by mouth daily. Provider, Historical   divalproex ER (DEPAKOTE ER) 500 mg ER tablet 500 mg daily.  1/31/18   Provider, Historical     Allergies   Allergen Reactions    Ace Inhibitors Cough       Patient Active Problem List   Diagnosis Code    Hyperlipidemia E78.5    Connective tissue disease ([facility] [address] M35.9    Depression F32.9    HH (hiatus hernia) K44.9    Gastroesophageal reflux disease K21.9  Rosacea L71.9    Capsulitis M77.9    Degeneration of intervertebral disc of cervical region M50.30    Allergy T78.40XA    Colitis, ischemic (HCC) K55.9    Anxiety associated with depression F41.8    Raynaud's syndrome I73.00    Hypothyroidism E03.9    Pulmonary nodules R91.8    Renal cyst N28.1    Hypovitaminosis D E55.9    Biliary dyskinesia K82.8    Osteoarthritis M19.90    Dyslipidemia E78.5    Classical migraine with intractable migraine G43.119    Rotator cuff tear arthropathy M75.100, M12.819    Lower gastrointestinal hemorrhage K92.2    Cervico-occipital neuralgia M54.81    History of cervical spinal surgery Z98.890    History of knee surgery Z98.890    Diarrhea R19.7    Osteopenia M85.80    Generalized arthritis M19.90    Essential hypertension I10     Current Outpatient Medications   Medication Sig Dispense Refill    clindamycin (CLEOCIN) 150 mg capsule Take 300 mg by mouth three (3) times daily.  trimethoprim-sulfamethoxazole (BACTRIM DS, SEPTRA DS) 160-800 mg per tablet Take 1 Tab by mouth two (2) times a day for 7 days. 14 Tab 0    dexlansoprazole (DEXILANT) 60 mg CpDB capsule (delayed release) Take  by mouth daily.  furosemide (LASIX) 20 mg tablet TAKE ONE TABLET BY MOUTH DAILY AS NEEDED FOR SWELLING 90 Tab 1    buPROPion (WELLBUTRIN) 100 mg tablet Take  by mouth daily.  atorvastatin (LIPITOR) 20 mg tablet Take  by mouth daily.  b complex vitamins (B COMPLEX 1) tablet Take 1 Tab by mouth daily.  ascorbic acid, vitamin C, (VITAMIN C) 500 mg tablet Take  by mouth daily.  lidocaine (XYLOCAINE) 5 % ointment Apply to affected area three times daily as needed 50 g 2    candesartan (ATACAND) 8 mg tablet Take 1 Tab by mouth daily. 90 Tab 1    levothyroxine (SYNTHROID) 50 mcg tablet TAKE 1 TABLET DAILY BEFORE BREAKFAST 90 Tab 1    verapamil ER (VERELAN) 360 mg ER capsule Take 1 Cap by mouth daily.  90 Cap 2    galcanezumab-gnlm (EMGALITY SYRINGE) 120 mg/mL syrg by SubCUTAneous route.  naratriptan (AMERGE) 2.5 mg tab Take 2.5 mg by mouth once as needed.  calcium-cholecalciferol, d3, (CALCIUM 600 + D) 600-125 mg-unit tab Take  by mouth two (2) times a day.  coenzyme q10 (CHEW Q) 100 mg chew Take 200 mg by mouth daily.  multivitamin (ONE A DAY) tablet Take 1 Tab by mouth daily.  magnesium Tab Take  by mouth two (2) times a day.  albuterol (PROVENTIL HFA, VENTOLIN HFA, PROAIR HFA) 90 mcg/actuation inhaler Take 2 Puffs by inhalation every eight (8) hours as needed for Wheezing. 1 Inhaler 0    DULoxetine (CYMBALTA) 30 mg capsule Take 30 mg by mouth daily.  divalproex ER (DEPAKOTE ER) 500 mg ER tablet 500 mg daily.   1     Allergies   Allergen Reactions    Ace Inhibitors Cough     Past Medical History:   Diagnosis Date    Allergy     Anxiety associated with depression 8/2/2011    Arthritis     Colitis, ischemic ([facility] [address]     Connective tissue disease ([facility] [address]     Degeneration of intervertebral disc of cervical region     Most recent MRI was Jan 2017, done by Dr. Karlee Clemente    Degenerative disc disease     Depression     anxiety    Generalized arthritis 1/24/2018    GERD (gastroesophageal reflux disease)     Headache(784.0)     migraines    HH (hiatus hernia)     Hyperlipidemia     Hypertension     Hypovitaminosis D 10/19/2015    Osteopenia 6/7/2017    Other forms of migraine, without mention of intractable migraine without mention of status migrainosus     Pulmonary nodules 2/25/2010    Raynaud's syndrome     Renal cyst 2/25/2014    Rosacea      Past Surgical History:   Procedure Laterality Date    CARDIAC SURG PROCEDURE UNLIST      cardiac cath    HX BUNIONECTOMY      HX CARPAL TUNNEL RELEASE      right    HX CERVICAL FUSION  1987    C5-6    HX KNEE ARTHROSCOPY      RIGHT    HX LUMBAR FUSION  1988 &1997    L5-S1 surgical repair    HX MOHS PROCEDURES      HX OTHER SURGICAL Left 1-7-16    Dr Rosa Martinez  General foot surgery     HX OTHER SURGICAL  1-2016    foot surgery    TOTAL KNEE ARTHROPLASTY  10/2011    right knee       Review of Systems   Skin:        Erythema, swelling face         Objective:     Visit Vitals  LMP  (LMP Unknown)      General: alert, cooperative, no distress   Mental  status: normal mood, behavior, speech, dress, motor activity, and thought processes, able to follow commands   HENT: NCAT   Neck: no visualized mass   Resp: no respiratory distress   Neuro: no gross deficits   Skin: + mild erythema, patchy areas on face; details limited 2/2 video camera use   Psychiatric: normal affect, consistent with stated mood, no evidence of hallucinations     Additional exam findings: We discussed the expected course, resolution and complications of the diagnosis(es) in detail. Medication risks, benefits, costs, interactions, and alternatives were discussed as indicated. I advised her to contact the office if her condition worsens, changes or fails to improve as anticipated. She expressed understanding with the diagnosis(es) and plan. Ronaldo Chavez is a 76 y.o. female being evaluated by a video visit encounter for concerns as above. A caregiver was present when appropriate. Due to this being a TeleHealth encounter ([facility]), evaluation of the following organ systems was limited: Vitals/Constitutional/EENT/Resp/CV/GI//MS/Neuro/Skin/Heme-Lymph-Imm. Pursuant to the emergency declaration under the [facility], 1135 waiver authority and the [employer] and [employer] General Act, this Virtual  Visit was conducted, with patient's (and/or legal guardian's) consent, to reduce the patient's risk of exposure to COVID-19 and provide necessary medical care. Services were provided through a video synchronous discussion virtually to substitute for in-person clinic visit.    Patient and provider were located at their individual homes.         Pinky Wise MD

## 2020-04-08 NOTE — PROGRESS NOTES
Patient prefer text to [phone] using [email or web address] me     Hernan Deng is a 76 y.o. female (: 1945) presenting to address:    Chief Complaint   Patient presents with    Facial Swelling       There were no vitals filed for this visit. Hearing/Vision:   No exam data present    Learning Assessment:     Learning Assessment 2/3/2015   PRIMARY LEARNER Patient   HIGHEST LEVEL OF EDUCATION - PRIMARY LEARNER  4 YEARS OF COLLEGE   BARRIERS PRIMARY LEARNER NONE   CO-LEARNER CAREGIVER No   PRIMARY LANGUAGE ENGLISH   LEARNER PREFERENCE PRIMARY LISTENING   ANSWERED BY self   RELATIONSHIP SELF     Depression Screening:     3 most recent PHQ Screens 3/30/2020   Little interest or pleasure in doing things Not at all   Feeling down, depressed, irritable, or hopeless Not at all   Total Score PHQ 2 0     Fall Risk Assessment:     Fall Risk Assessment, last 12 mths 3/30/2020   Able to walk? Yes   Fall in past 12 months? No   Fall with injury? -   Number of falls in past 12 months -   Fall Risk Score -     Abuse Screening:     Abuse Screening Questionnaire 3/30/2020   Do you ever feel afraid of your partner? N   Are you in a relationship with someone who physically or mentally threatens you? N   Is it safe for you to go home? Y     Coordination of Care Questionaire:   1. Have you been to the ER, urgent care clinic since your last visit? Hospitalized since your last visit? YES    2. Have you seen or consulted any other health care providers outside of the [address] [place] [address] since your last visit? Include any pap smears or colon screening. NO    Advanced Directive:   1. Do you have an Advanced Directive? YES    2. Would you like information on Advanced Directives?  NO

## 2020-04-20 RX ORDER — CANDESARTAN 8 MG/1
TABLET ORAL
Qty: 90 TAB | Refills: 0 | Status: SHIPPED | OUTPATIENT
Start: 2020-04-20 | End: 2020-07-20

## 2020-06-22 ENCOUNTER — TELEPHONE (OUTPATIENT)
Dept: FAMILY MEDICINE CLINIC | Age: 75
End: 2020-06-22

## 2020-06-22 NOTE — TELEPHONE ENCOUNTER
Patient would like to come in for an appointment. She is having memory issues and pain in her legs. I tried to schedule a virtual visit but patient refused she would like to hear from Dr. Frank Brownlee.      Please call patient

## 2020-06-22 NOTE — TELEPHONE ENCOUNTER
Left message for patient to call office . Dr Aquilino Johnston is okay with her coming in if she can't do a Virtual visit. But wants me to get more details on her leg pain and also notify her we cant do both at the same visit because memory testing requires 30 minutes because there is a lot of questions involved .

## 2020-07-30 ENCOUNTER — TELEPHONE (OUTPATIENT)
Dept: FAMILY MEDICINE CLINIC | Age: 75
End: 2020-07-30

## 2020-08-31 ENCOUNTER — OFFICE VISIT (OUTPATIENT)
Dept: FAMILY MEDICINE CLINIC | Age: 75
End: 2020-08-31

## 2020-08-31 VITALS
SYSTOLIC BLOOD PRESSURE: 143 MMHG | WEIGHT: 167 LBS | BODY MASS INDEX: 26.16 KG/M2 | TEMPERATURE: 97.6 F | DIASTOLIC BLOOD PRESSURE: 84 MMHG | HEART RATE: 61 BPM | RESPIRATION RATE: 16 BRPM | OXYGEN SATURATION: 100 %

## 2020-08-31 DIAGNOSIS — M25.511 RIGHT SHOULDER PAIN, UNSPECIFIED CHRONICITY: Primary | ICD-10-CM

## 2020-08-31 NOTE — PROGRESS NOTES
Assessment/Plan:    *Diagnoses and all orders for this visit:    1. Right shoulder pain, unspecified chronicity  -     XR SHOULDER RT AP/LAT MIN 2 V; Future        Will await Xray results. The plan was discussed with the patient. The patient verbalized understanding and is in agreement with the plan. All medication potential side effects were discussed with the patient.    -------------------------------------------------------------------------------------------------------------------        Toro Kaufman is a 76 y.o. female and presents with Shoulder Pain (right )         Subjective:  Pt here for RT shoulder pain. She has been doing a lot of heavy yard work and feels she just over did things. Has restricted movement in shoulder and pain associated with it. Pain is 6-7/10 when she tries to reach overhead to do anything. Can not lifting anything of significant weight with the arm. Has been about 2 weeks, not getting better. Review of Systems - Musculoskeletal ROS: positive for - pain in shoulder - right         The problem list was updated as a part of today's visit.   Patient Active Problem List   Diagnosis Code    Hyperlipidemia E78.5    Depression F32.9    HH (hiatus hernia) K44.9    Gastroesophageal reflux disease K21.9    Rosacea L71.9    Capsulitis M77.9    Degeneration of intervertebral disc of cervical region M50.30    Allergy T78.40XA    Anxiety associated with depression F41.8    Raynaud's syndrome I73.00    Hypothyroidism E03.9    Pulmonary nodules R91.8    Renal cyst N28.1    Hypovitaminosis D E55.9    Biliary dyskinesia K82.8    Osteoarthritis M19.90    Dyslipidemia E78.5    Classical migraine with intractable migraine G43.119    Rotator cuff tear arthropathy M75.100, M12.819    Lower gastrointestinal hemorrhage K92.2    Cervico-occipital neuralgia M54.81    History of cervical spinal surgery Z98.890    History of knee surgery Z98.890    Diarrhea R19.7    Osteopenia M85.80    Generalized arthritis M19.90    Essential hypertension I10       The PSH, FH were reviewed. SH:  Social History     Tobacco Use    Smoking status: Never Smoker    Smokeless tobacco: Never Used   Substance Use Topics    Alcohol use: No    Drug use: No       Medications/Allergies:  Current Outpatient Medications on File Prior to Visit   Medication Sig Dispense Refill    candesartan (ATACAND) 8 mg tablet TAKE ONE TABLET BY MOUTH DAILY 90 Tab 1    pantoprazole (PROTONIX) 40 mg tablet TAKE ONE TABLET BY MOUTH DAILY 90 Tab 1    verapamil ER (VERELAN) 360 mg ER capsule TAKE 1 CAPSULE DAILY 90 Cap 1    atorvastatin (LIPITOR) 20 mg tablet TAKE ONE TABLET BY MOUTH DAILY 90 Tab 1    dexlansoprazole (DEXILANT) 60 mg CpDB capsule (delayed release) Take  by mouth daily.  furosemide (LASIX) 20 mg tablet TAKE ONE TABLET BY MOUTH DAILY AS NEEDED FOR SWELLING 90 Tab 1    buPROPion (WELLBUTRIN) 100 mg tablet Take  by mouth daily.  b complex vitamins (B COMPLEX 1) tablet Take 1 Tab by mouth daily.  ascorbic acid, vitamin C, (VITAMIN C) 500 mg tablet Take  by mouth daily.  lidocaine (XYLOCAINE) 5 % ointment Apply to affected area three times daily as needed 50 g 2    levothyroxine (SYNTHROID) 50 mcg tablet TAKE 1 TABLET DAILY BEFORE BREAKFAST 90 Tab 1    galcanezumab-gnlm (EMGALITY SYRINGE) 120 mg/mL syrg by SubCUTAneous route.  naratriptan (AMERGE) 2.5 mg tab Take 2.5 mg by mouth once as needed.  calcium-cholecalciferol, d3, (CALCIUM 600 + D) 600-125 mg-unit tab Take  by mouth two (2) times a day.  coenzyme q10 (CHEW Q) 100 mg chew Take 200 mg by mouth daily.  multivitamin (ONE A DAY) tablet Take 1 Tab by mouth daily.  magnesium Tab Take  by mouth two (2) times a day.  [DISCONTINUED] clindamycin (CLEOCIN) 150 mg capsule Take 300 mg by mouth three (3) times daily.       [DISCONTINUED] albuterol (PROVENTIL HFA, VENTOLIN HFA, PROAIR HFA) 90 mcg/actuation inhaler Take 2 Puffs by inhalation every eight (8) hours as needed for Wheezing. 1 Inhaler 0    [DISCONTINUED] DULoxetine (CYMBALTA) 30 mg capsule Take 30 mg by mouth daily.  [DISCONTINUED] divalproex ER (DEPAKOTE ER) 500 mg ER tablet 500 mg daily. 1     No current facility-administered medications on file prior to visit. Allergies   Allergen Reactions    Ace Inhibitors Cough         Health Maintenance:   Health Maintenance   Topic Date Due    Medicare Yearly Exam  06/19/2020    Lipid Screen  06/19/2020    Breast Cancer Screen Mammogram  09/12/2021    GLAUCOMA SCREENING Q2Y  04/08/2022    Colonoscopy  06/02/2025    DTaP/Tdap/Td series (4 - Td) 04/22/2026    Hepatitis C Screening  Completed    Bone Densitometry (Dexa) Screening  Completed    Shingrix Vaccine Age 50>  Completed    Pneumococcal 65+ years  Completed       Objective:  Visit Vitals  /84   Pulse 61   Temp 97.6 °F (36.4 °C) (Oral)   Resp 16   Wt 167 lb (75.8 kg)   LMP  (LMP Unknown)   SpO2 100%   BMI 26.16 kg/m²          Nurses notes and VS reviewed.       Physical Examination: General appearance - alert, well appearing, and in no distress  Musculoskeletal - abnormal exam of right shoulder: can raise arm to approx 80 degrees then restricted, can internally rotate, no visible swelling, + TTP over the acromion          Labwork and Ancillary Studies:    CBC w/Diff  Lab Results   Component Value Date/Time    WBC 5.4 05/01/2019 11:49 AM    HGB 14.3 05/01/2019 11:49 AM    PLATELET 433 43/06/6780 11:49 AM         Basic Metabolic Profile  Lab Results   Component Value Date/Time    Sodium 141 05/01/2019 11:49 AM    Potassium 4.4 05/01/2019 11:49 AM    Chloride 106 05/01/2019 11:49 AM    CO2 28 05/01/2019 11:49 AM    Anion gap 7 05/01/2019 11:49 AM    Glucose 88 05/01/2019 11:49 AM    BUN 30 (H) 05/01/2019 11:49 AM    Creatinine 0.88 05/01/2019 11:49 AM    BUN/Creatinine ratio 34 (H) 05/01/2019 11:49 AM    GFR est AA >60 05/01/2019 11:49 AM    GFR est non-AA >60 05/01/2019 11:49 AM    Calcium 9.0 05/01/2019 11:49 AM         LFT  Lab Results   Component Value Date/Time    ALT (SGPT) 18 05/01/2019 11:49 AM    Alk.  phosphatase 88 05/01/2019 11:49 AM    Bilirubin, direct 0.1 05/01/2019 11:49 AM    Bilirubin, total 0.5 05/01/2019 11:49 AM         Cholesterol  Lab Results   Component Value Date/Time    Cholesterol, total 140 06/19/2019 02:07 PM    HDL Cholesterol 65 (H) 06/19/2019 02:07 PM    LDL, calculated 62.8 06/19/2019 02:07 PM    Triglyceride 61 06/19/2019 02:07 PM    CHOL/HDL Ratio 2.2 06/19/2019 02:07 PM

## 2020-08-31 NOTE — PATIENT INSTRUCTIONS
Shoulder Pain: Care Instructions Your Care Instructions You can hurt your shoulder by using it too much during an activity, such as fishing or baseball. It can also happen as part of the everyday wear and tear of getting older. Shoulder injuries can be slow to heal, but your shoulder should get better with time. Your doctor may recommend a sling to rest your shoulder. If you have injured your shoulder, you may need testing and treatment. Follow-up care is a key part of your treatment and safety. Be sure to make and go to all appointments, and call your doctor if you are having problems. It's also a good idea to know your test results and keep a list of the medicines you take. How can you care for yourself at home? · Take pain medicines exactly as directed. ? If the doctor gave you a prescription medicine for pain, take it as prescribed. ? If you are not taking a prescription pain medicine, ask your doctor if you can take an over-the-counter medicine. ? Do not take two or more pain medicines at the same time unless the doctor told you to. Many pain medicines contain acetaminophen, which is Tylenol. Too much acetaminophen (Tylenol) can be harmful. · If your doctor recommends that you wear a sling, use it as directed. Do not take it off before your doctor tells you to. · Put ice or a cold pack on the sore area for 10 to 20 minutes at a time. Put a thin cloth between the ice and your skin. · If there is no swelling, you can put moist heat, a heating pad, or a warm cloth on your shoulder. Some doctors suggest alternating between hot and cold. · Rest your shoulder for a few days. If your doctor recommends it, you can then begin gentle exercise of the shoulder, but do not lift anything heavy. When should you call for help? [id number] anytime you think you may need emergency care. For example, call if: 
· You have chest pain or pressure. This may occur with: ? Sweating. ? Shortness of breath. ? Nausea or vomiting. ? Pain that spreads from the chest to the neck, jaw, or one or both shoulders or arms. ? Dizziness or lightheadedness. ? A fast or uneven pulse. After calling 911, chew 1 adult-strength aspirin. Wait for an ambulance. Do not try to drive yourself. · Your arm or hand is cool or pale or changes color. Call your doctor now or seek immediate medical care if: 
· You have signs of infection, such as: 
? Increased pain, swelling, warmth, or redness in your shoulder. ? Red streaks leading from a place on your shoulder. ? Pus draining from an area of your shoulder. ? Swollen lymph nodes in your neck, armpits, or groin. ? A fever. Watch closely for changes in your health, and be sure to contact your doctor if: 
· You cannot use your shoulder. · Your shoulder does not get better as expected. Where can you learn more? Go to http://gilda-michael.info/ Enter M160 in the search box to learn more about \"Shoulder Pain: Care Instructions. \" Current as of: March 2, 2020               Content Version: 12.5 © 6792-6905 [employer]. Care instructions adapted under license by [employer] (which disclaims liability or warranty for this information). If you have questions about a medical condition or this instruction, always ask your healthcare professional. [place] [address] any warranty or liability for your use of this information.

## 2020-08-31 NOTE — PROGRESS NOTES
Donna Sarah is a 76 y.o. female (: 1945) presenting to address:    Chief Complaint   Patient presents with    Shoulder Pain     right        Vitals:    20 0907   BP: 143/84   Pulse: 61   Resp: 16   Temp: 97.6 °F (36.4 °C)   TempSrc: Oral   SpO2: 100%   Weight: 167 lb (75.8 kg)   PainSc:   6   PainLoc: Shoulder       Hearing/Vision:   No exam data present    Learning Assessment:     Learning Assessment 2/3/2015   PRIMARY LEARNER Patient   HIGHEST LEVEL OF EDUCATION - PRIMARY LEARNER  4 YEARS OF COLLEGE   BARRIERS PRIMARY LEARNER NONE   CO-LEARNER CAREGIVER No   PRIMARY LANGUAGE ENGLISH   LEARNER PREFERENCE PRIMARY LISTENING   ANSWERED BY self   RELATIONSHIP SELF     Depression Screening:     3 most recent PHQ Screens 3/30/2020   Little interest or pleasure in doing things Not at all   Feeling down, depressed, irritable, or hopeless Not at all   Total Score PHQ 2 0     Fall Risk Assessment:     Fall Risk Assessment, last 12 mths 3/30/2020   Able to walk? Yes   Fall in past 12 months? No   Fall with injury? -   Number of falls in past 12 months -   Fall Risk Score -     Abuse Screening:     Abuse Screening Questionnaire 3/30/2020   Do you ever feel afraid of your partner? N   Are you in a relationship with someone who physically or mentally threatens you? N   Is it safe for you to go home? Y     Coordination of Care Questionaire:   1. Have you been to the ER, urgent care clinic since your last visit? Hospitalized since your last visit? NO    2. Have you seen or consulted any other health care providers outside of the [address] [place] [address] since your last visit? Include any pap smears or colon screening. NO    Advanced Directive:   1. Do you have an Advanced Directive? NO    2. Would you like information on Advanced Directives?  NO

## 2020-10-14 ENCOUNTER — HOSPITAL ENCOUNTER (OUTPATIENT)
Dept: LAB | Age: 75
Discharge: HOME | End: 2020-10-14
Payer: MEDICARE

## 2020-10-14 ENCOUNTER — APPOINTMENT (OUTPATIENT)
Dept: FAMILY MEDICINE CLINIC | Age: 75
End: 2020-10-14

## 2020-10-14 DIAGNOSIS — I10 ESSENTIAL HYPERTENSION: ICD-10-CM

## 2020-10-14 LAB
ALBUMIN SERPL-MCNC: 3.8 G/DL (ref 3.4–5)
ALBUMIN/GLOB SERPL: 1.4 {RATIO} (ref 0.8–1.7)
ALP SERPL-CCNC: 87 U/L (ref 45–117)
ALT SERPL-CCNC: 23 U/L (ref 13–56)
ANION GAP SERPL CALC-SCNC: 4 MMOL/L (ref 3–18)
APPEARANCE UR: NORMAL
AST SERPL-CCNC: 22 U/L (ref 10–38)
BASOPHILS # BLD: 0.1 K/UL (ref 0–0.1)
BASOPHILS NFR BLD: 1 % (ref 0–2)
BILIRUB DIRECT SERPL-MCNC: 0.1 MG/DL (ref 0–0.2)
BILIRUB SERPL-MCNC: 0.3 MG/DL (ref 0.2–1)
BILIRUB UR QL: NEGATIVE
BUN SERPL-MCNC: 23 MG/DL (ref 7–18)
BUN/CREAT SERPL: 25 (ref 12–20)
CALCIUM SERPL-MCNC: 9.9 MG/DL (ref 8.5–10.1)
CHLORIDE SERPL-SCNC: 106 MMOL/L (ref 100–111)
CHOLEST SERPL-MCNC: 159 MG/DL
CO2 SERPL-SCNC: 33 MMOL/L (ref 21–32)
COLOR UR: YELLOW
CREAT SERPL-MCNC: 0.93 MG/DL (ref 0.6–1.3)
DIFFERENTIAL METHOD BLD: ABNORMAL
EOSINOPHIL # BLD: 0.2 K/UL (ref 0–0.4)
EOSINOPHIL NFR BLD: 3 % (ref 0–5)
ERYTHROCYTE [DISTWIDTH] IN BLOOD BY AUTOMATED COUNT: 13.9 % (ref 11.6–14.5)
GLOBULIN SER CALC-MCNC: 2.8 G/DL (ref 2–4)
GLUCOSE SERPL-MCNC: 79 MG/DL (ref 74–99)
GLUCOSE UR STRIP.AUTO-MCNC: NEGATIVE MG/DL
HCT VFR BLD AUTO: 45.4 % (ref 35–45)
HDLC SERPL-MCNC: 67 MG/DL (ref 40–60)
HDLC SERPL: 2.4 {RATIO} (ref 0–5)
HGB BLD-MCNC: 14.7 G/DL (ref 12–16)
HGB UR QL STRIP: NEGATIVE
KETONES UR QL STRIP.AUTO: NEGATIVE MG/DL
LDLC SERPL CALC-MCNC: 79.6 MG/DL (ref 0–100)
LEUKOCYTE ESTERASE UR QL STRIP.AUTO: NEGATIVE
LIPID PROFILE,FLP: ABNORMAL
LYMPHOCYTES # BLD: 2.2 K/UL (ref 0.9–3.6)
LYMPHOCYTES NFR BLD: 36 % (ref 21–52)
MCH RBC QN AUTO: 31.3 PG (ref 24–34)
MCHC RBC AUTO-ENTMCNC: 32.4 G/DL (ref 31–37)
MCV RBC AUTO: 96.6 FL (ref 74–97)
MONOCYTES # BLD: 0.7 K/UL (ref 0.05–1.2)
MONOCYTES NFR BLD: 12 % (ref 3–10)
NEUTS SEG # BLD: 3 K/UL (ref 1.8–8)
NEUTS SEG NFR BLD: 48 % (ref 40–73)
NITRITE UR QL STRIP.AUTO: NEGATIVE
PH UR STRIP: 8 [PH] (ref 5–8)
PLATELET # BLD AUTO: 226 K/UL (ref 135–420)
PMV BLD AUTO: 11 FL (ref 9.2–11.8)
POTASSIUM SERPL-SCNC: 4.5 MMOL/L (ref 3.5–5.5)
PROT SERPL-MCNC: 6.6 G/DL (ref 6.4–8.2)
PROT UR STRIP-MCNC: NEGATIVE MG/DL
RBC # BLD AUTO: 4.7 M/UL (ref 4.2–5.3)
SODIUM SERPL-SCNC: 143 MMOL/L (ref 136–145)
SP GR UR REFRACTOMETRY: 1.02 (ref 1–1.03)
T4 FREE SERPL-MCNC: 1 NG/DL (ref 0.7–1.5)
TRIGL SERPL-MCNC: 62 MG/DL (ref ?–150)
TSH SERPL DL<=0.05 MIU/L-ACNC: 4.19 UIU/ML (ref 0.36–3.74)
UROBILINOGEN UR QL STRIP.AUTO: 1 EU/DL (ref 0.2–1)
VLDLC SERPL CALC-MCNC: 12.4 MG/DL
WBC # BLD AUTO: 6.1 K/UL (ref 4.6–13.2)

## 2020-10-14 PROCEDURE — 80061 LIPID PANEL: CPT

## 2020-10-14 PROCEDURE — 80048 BASIC METABOLIC PNL TOTAL CA: CPT

## 2020-10-14 PROCEDURE — 80076 HEPATIC FUNCTION PANEL: CPT

## 2020-10-14 PROCEDURE — 36415 COLL VENOUS BLD VENIPUNCTURE: CPT

## 2020-10-14 PROCEDURE — 84443 ASSAY THYROID STIM HORMONE: CPT

## 2020-10-14 PROCEDURE — 84439 ASSAY OF FREE THYROXINE: CPT

## 2020-10-14 PROCEDURE — 85025 COMPLETE CBC W/AUTO DIFF WBC: CPT

## 2020-10-14 PROCEDURE — 81003 URINALYSIS AUTO W/O SCOPE: CPT

## 2020-10-21 ENCOUNTER — OFFICE VISIT (OUTPATIENT)
Dept: FAMILY MEDICINE CLINIC | Age: 75
End: 2020-10-21
Payer: MEDICARE

## 2020-10-21 VITALS
WEIGHT: 170 LBS | TEMPERATURE: 97.3 F | SYSTOLIC BLOOD PRESSURE: 113 MMHG | BODY MASS INDEX: 26.68 KG/M2 | OXYGEN SATURATION: 99 % | RESPIRATION RATE: 16 BRPM | HEART RATE: 70 BPM | DIASTOLIC BLOOD PRESSURE: 75 MMHG | HEIGHT: 67 IN

## 2020-10-21 DIAGNOSIS — E03.4 HYPOTHYROIDISM DUE TO ACQUIRED ATROPHY OF THYROID: ICD-10-CM

## 2020-10-21 DIAGNOSIS — I87.2 VENOUS INSUFFICIENCY: ICD-10-CM

## 2020-10-21 DIAGNOSIS — Z78.0 POSTMENOPAUSAL: ICD-10-CM

## 2020-10-21 DIAGNOSIS — Z00.00 MEDICARE ANNUAL WELLNESS VISIT, SUBSEQUENT: ICD-10-CM

## 2020-10-21 DIAGNOSIS — I10 ESSENTIAL HYPERTENSION: Primary | ICD-10-CM

## 2020-10-21 PROCEDURE — G0463 HOSPITAL OUTPT CLINIC VISIT: HCPCS | Performed by: INTERNAL MEDICINE

## 2020-10-21 PROCEDURE — 3288F FALL RISK ASSESSMENT DOCD: CPT | Performed by: INTERNAL MEDICINE

## 2020-10-21 PROCEDURE — G8754 DIAS BP LESS 90: HCPCS | Performed by: INTERNAL MEDICINE

## 2020-10-21 PROCEDURE — G8419 CALC BMI OUT NRM PARAM NOF/U: HCPCS | Performed by: INTERNAL MEDICINE

## 2020-10-21 PROCEDURE — 3017F COLORECTAL CA SCREEN DOC REV: CPT | Performed by: INTERNAL MEDICINE

## 2020-10-21 PROCEDURE — G8752 SYS BP LESS 140: HCPCS | Performed by: INTERNAL MEDICINE

## 2020-10-21 PROCEDURE — G8536 NO DOC ELDER MAL SCRN: HCPCS | Performed by: INTERNAL MEDICINE

## 2020-10-21 PROCEDURE — 1090F PRES/ABSN URINE INCON ASSESS: CPT | Performed by: INTERNAL MEDICINE

## 2020-10-21 PROCEDURE — G9899 SCRN MAM PERF RSLTS DOC: HCPCS | Performed by: INTERNAL MEDICINE

## 2020-10-21 PROCEDURE — 99213 OFFICE O/P EST LOW 20 MIN: CPT | Performed by: INTERNAL MEDICINE

## 2020-10-21 PROCEDURE — G9717 DOC PT DX DEP/BP F/U NT REQ: HCPCS | Performed by: INTERNAL MEDICINE

## 2020-10-21 PROCEDURE — G0439 PPPS, SUBSEQ VISIT: HCPCS | Performed by: INTERNAL MEDICINE

## 2020-10-21 PROCEDURE — 1100F PTFALLS ASSESS-DOCD GE2>/YR: CPT | Performed by: INTERNAL MEDICINE

## 2020-10-21 PROCEDURE — G8427 DOCREV CUR MEDS BY ELIG CLIN: HCPCS | Performed by: INTERNAL MEDICINE

## 2020-10-21 PROCEDURE — G8399 PT W/DXA RESULTS DOCUMENT: HCPCS | Performed by: INTERNAL MEDICINE

## 2020-10-21 RX ORDER — RIBOFLAVIN (VITAMIN B2) 100 MG
TABLET ORAL DAILY
COMMUNITY

## 2020-10-21 RX ORDER — FUROSEMIDE 20 MG/1
TABLET ORAL
Qty: 90 TAB | Refills: 0 | Status: SHIPPED | OUTPATIENT
Start: 2020-10-21 | End: 2020-10-28

## 2020-10-21 RX ORDER — LEVOTHYROXINE SODIUM 75 UG/1
TABLET ORAL
Qty: 90 TAB | Refills: 2 | Status: SHIPPED | OUTPATIENT
Start: 2020-10-21

## 2020-10-21 RX ORDER — SUMATRIPTAN 6 MG/.5ML
6 INJECTION, SOLUTION SUBCUTANEOUS AS NEEDED
COMMUNITY

## 2020-10-21 RX ORDER — CHOLECALCIFEROL (VITAMIN D3) 50 MCG
CAPSULE ORAL DAILY
COMMUNITY

## 2020-10-21 NOTE — PROGRESS NOTES
This is the Subsequent Medicare Annual Wellness Exam, performed 12 months or more after the Initial AWV or the last Subsequent AWV    I have reviewed the patient's medical history in detail and updated the computerized patient record.      History     Patient Active Problem List   Diagnosis Code    Hyperlipidemia E78.5    Depression F32.9    HH (hiatus hernia) K44.9    Gastroesophageal reflux disease K21.9    Rosacea L71.9    Capsulitis M77.9    Degeneration of intervertebral disc of cervical region M50.30    Allergy T78.40XA    Anxiety associated with depression F41.8    Raynaud's syndrome I73.00    Hypothyroidism E03.9    Pulmonary nodules R91.8    Renal cyst N28.1    Hypovitaminosis D E55.9    Biliary dyskinesia K82.8    Osteoarthritis M19.90    Dyslipidemia E78.5    Classical migraine with intractable migraine G43.119    Rotator cuff tear arthropathy M75.100, M12.819    Lower gastrointestinal hemorrhage K92.2    Cervico-occipital neuralgia M54.81    History of cervical spinal surgery Z98.890    History of knee surgery Z98.890    Diarrhea R19.7    Osteopenia M85.80    Generalized arthritis M19.90    Essential hypertension I10     Past Medical History:   Diagnosis Date    Allergy     Anxiety associated with depression 8/2/2011    Arthritis     Degeneration of intervertebral disc of cervical region     Most recent MRI was Jan 2017, done by Dr. Miri Curiel    Degenerative disc disease     Depression     anxiety    Generalized arthritis 1/24/2018    GERD (gastroesophageal reflux disease)     Headache(784.0)     migraines    HH (hiatus hernia)     Hyperlipidemia     Hypertension     Hypovitaminosis D 10/19/2015    Osteopenia 6/7/2017    Other forms of migraine, without mention of intractable migraine without mention of status migrainosus     Pulmonary nodules 2/25/2010    Raynaud's syndrome     Renal cyst 2/25/2014    Rosacea       Past Surgical History:   Procedure Laterality Date    CARDIAC SURG PROCEDURE UNLIST      cardiac cath    HX BUNIONECTOMY      HX CARPAL TUNNEL RELEASE      right    HX CERVICAL FUSION  1987    C5-6    HX KNEE ARTHROSCOPY      RIGHT    HX LUMBAR FUSION  1988 &1997    L5-S1 surgical repair    HX MOHS PROCEDURES      HX OTHER SURGICAL Left 1-7-16    Dr Suzy Jiang VB General foot surgery     HX OTHER SURGICAL  1-2016    foot surgery    TOTAL KNEE ARTHROPLASTY  10/2011    right knee     Current Outpatient Medications   Medication Sig Dispense Refill    SUMAtriptan (IMITREX) 6 mg/0.5 mL injection 6 mg by SubCUTAneous route as needed.  omega 3-dha-epa-fish oil (Fish Oil) 100-160-1,000 mg cap Take  by mouth daily.  riboflavin, vitamin B2, (Vitamin B-2) 100 mg tablet Take  by mouth daily.  furosemide (LASIX) 20 mg tablet TAKE ONE TABLET BY MOUTH DAILY AS NEEDED FOR SWELLING 90 Tab 0    levothyroxine (SYNTHROID) 75 mcg tablet TAKE 1 TABLET DAILY BEFORE BREAKFAST 90 Tab 2    candesartan (ATACAND) 8 mg tablet TAKE ONE TABLET BY MOUTH DAILY 90 Tab 1    pantoprazole (PROTONIX) 40 mg tablet TAKE ONE TABLET BY MOUTH DAILY 90 Tab 1    verapamil ER (VERELAN) 360 mg ER capsule TAKE 1 CAPSULE DAILY 90 Cap 1    atorvastatin (LIPITOR) 20 mg tablet TAKE ONE TABLET BY MOUTH DAILY 90 Tab 1    lidocaine (XYLOCAINE) 5 % ointment Apply to affected area three times daily as needed 50 g 2    galcanezumab-gnlm (EMGALITY SYRINGE) 120 mg/mL syrg by SubCUTAneous route.  naratriptan (AMERGE) 2.5 mg tab Take 2.5 mg by mouth once as needed.  calcium-cholecalciferol, d3, (CALCIUM 600 + D) 600-125 mg-unit tab Take  by mouth two (2) times a day.  coenzyme q10 (CHEW Q) 100 mg chew Take 200 mg by mouth daily.  multivitamin (ONE A DAY) tablet Take 1 Tab by mouth daily.  magnesium Tab Take 500 mg by mouth two (2) times a day.  dexlansoprazole (DEXILANT) 60 mg CpDB capsule (delayed release) Take  by mouth daily.       buPROPion [facility]) 100 mg tablet Take  by mouth daily.  b complex vitamins (B COMPLEX 1) tablet Take 1 Tab by mouth daily.  ascorbic acid, vitamin C, (VITAMIN C) 500 mg tablet Take  by mouth daily. Allergies   Allergen Reactions    Ace Inhibitors Cough       Family History   Problem Relation Age of Onset    Stroke Mother     Stroke Father      Social History     Tobacco Use    Smoking status: Never Smoker    Smokeless tobacco: Never Used   Substance Use Topics    Alcohol use: No       Depression Risk Factor Screening:     3 most recent PHQ Screens 10/21/2020   Little interest or pleasure in doing things Not at all   Feeling down, depressed, irritable, or hopeless Not at all   Total Score PHQ 2 0       Alcohol Risk Screen   Do you average more than 1 drink per night or more than 7 drinks a week:  No    On any one occasion in the past three months have you have had more than 3 drinks containing alcohol:  No        Functional Ability and Level of Safety:   Hearing: Hearing is good. Activities of Daily Living: The home contains: no safety equipment. Patient does total self care     Ambulation: with no difficulty     Fall Risk:  Fall Risk Assessment, last 12 mths 10/21/2020   Able to walk? Yes   Fall in past 12 months? Yes   Fall with injury? No   Number of falls in past 12 months 1   Fall Risk Score 1     Abuse Screen:  Patient is not abused       Cognitive Screening   Has your family/caregiver stated any concerns about your memory: yes - pt has noticed some change         Patient Care Team   Patient Care Team:  Rajendra Christine MD as PCP - General  Rajendra Christine MD as PCP - Ismael Javed Provider  Ivania Landry MD (Ophthalmology)    Assessment/Plan   Education and counseling provided:  Are appropriate based on today's review and evaluation    Diagnoses and all orders for this visit:    1. Medicare annual wellness visit, subsequent    2.  Venous insufficiency  -     furosemide (LASIX) 20 mg tablet; TAKE ONE TABLET BY MOUTH DAILY AS NEEDED FOR SWELLING    3. Postmenopausal  -     DEXA BONE DENSITY STUDY AXIAL; Future    4. Essential hypertension    5.  Hypothyroidism due to acquired atrophy of thyroid    Other orders  -     levothyroxine (SYNTHROID) 75 mcg tablet; TAKE 1 TABLET DAILY BEFORE BREAKFAST        Health Maintenance Due   Topic Date Due    Medicare Yearly Exam  06/19/2020    Flu Vaccine (1) 09/01/2020

## 2020-10-21 NOTE — PATIENT INSTRUCTIONS
Medicare Wellness Visit, Female The best way to live healthy is to have a lifestyle where you eat a well-balanced diet, exercise regularly, limit alcohol use, and quit all forms of tobacco/nicotine, if applicable. Regular preventive services are another way to keep healthy. Preventive services (vaccines, screening tests, monitoring & exams) can help personalize your care plan, which helps you manage your own care. Screening tests can find health problems at the earliest stages, when they are easiest to treat. [facility] follows the current, evidence-based guidelines published by the [facility] ([facility]) when recommending preventive services for our patients. Because we follow these guidelines, sometimes recommendations change over time as research supports it. (For example, mammograms used to be recommended annually. Even though Medicare will still pay for an annual mammogram, the newer guidelines recommend a mammogram every two years for women of average risk). Of course, you and your doctor may decide to screen more often for some diseases, based on your risk and your co-morbidities (chronic disease you are already diagnosed with). Preventive services for you include: - Medicare offers their members a free annual wellness visit, which is time for you and your primary care provider to discuss and plan for your preventive service needs. Take advantage of this benefit every year! 
-All adults over the age of 72 should receive the recommended pneumonia vaccines. Current USPSTF guidelines recommend a series of two vaccines for the best pneumonia protection.  
-All adults should have a flu vaccine yearly and a tetanus vaccine every 10 years.  
-All adults age 48 and older should receive the shingles vaccines (series of two vaccines). -All adults age 38-68 who are overweight should have a diabetes screening test once every three years. -All adults born between [birth year] and 1965 should be screened once for Hepatitis C. 
-Other screening tests and preventive services for persons with diabetes include: an eye exam to screen for diabetic retinopathy, a kidney function test, a foot exam, and stricter control over your cholesterol.  
-Cardiovascular screening for adults with routine risk involves an electrocardiogram (ECG) at intervals determined by your doctor.  
-Colorectal cancer screenings should be done for adults age 54-65 with no increased risk factors for colorectal cancer. There are a number of acceptable methods of screening for this type of cancer. Each test has its own benefits and drawbacks. Discuss with your doctor what is most appropriate for you during your annual wellness visit. The different tests include: colonoscopy (considered the best screening method), a fecal occult blood test, a fecal DNA test, and sigmoidoscopy. 
 
-A bone mass density test is recommended when a woman turns 65 to screen for osteoporosis. This test is only recommended one time, as a screening. Some providers will use this same test as a disease monitoring tool if you already have osteoporosis. -Breast cancer screenings are recommended every other year for women of normal risk, age 54-69. 
-Cervical cancer screenings for women over age 72 are only recommended with certain risk factors. Here is a list of your current Health Maintenance items (your personalized list of preventive services) with a due date: 
Health Maintenance Due Topic Date Due  
 Annual Well Visit  06/19/2020  Yearly Flu Vaccine (1) 09/01/2020

## 2020-10-21 NOTE — PROGRESS NOTES
Ashley Ocasio is a 76 y.o. female (: 1945) presenting to address:    Chief Complaint   Patient presents with   [facility] Annual Wellness Visit     due for bone density        Vitals:    10/21/20 1435   BP: 113/75   Pulse: 70   Resp: 16   Temp: 97.3 °F (36.3 °C)   TempSrc: Temporal   SpO2: 99%   Weight: 170 lb (77.1 kg)   Height: 5' 7\" (1.702 m)   PainSc:   7   PainLoc: Shoulder       Hearing/Vision:   No exam data present    Learning Assessment:     Learning Assessment 2/3/2015   PRIMARY LEARNER Patient   HIGHEST LEVEL OF EDUCATION - PRIMARY LEARNER  4 YEARS OF COLLEGE   BARRIERS PRIMARY LEARNER NONE   CO-LEARNER CAREGIVER No   PRIMARY LANGUAGE ENGLISH   LEARNER PREFERENCE PRIMARY LISTENING   ANSWERED BY self   RELATIONSHIP SELF     Depression Screening:     3 most recent PHQ Screens 10/21/2020   Little interest or pleasure in doing things Not at all   Feeling down, depressed, irritable, or hopeless Not at all   Total Score PHQ 2 0     Fall Risk Assessment:     Fall Risk Assessment, last 12 mths 10/21/2020   Able to walk? Yes   Fall in past 12 months? Yes   Fall with injury? No   Number of falls in past 12 months 1   Fall Risk Score 1     Abuse Screening:     Abuse Screening Questionnaire 10/21/2020   Do you ever feel afraid of your partner? N   Are you in a relationship with someone who physically or mentally threatens you? N   Is it safe for you to go home? Y     Coordination of Care Questionaire:   1. Have you been to the ER, urgent care clinic since your last visit? Hospitalized since your last visit? NO    2. Have you seen or consulted any other health care providers outside of the [address] [place] [address] since your last visit? Include any pap smears or colon screening. NO    Advanced Directive:   1. Do you have an Advanced Directive? NO    2. Would you like information on Advanced Directives?  NO

## 2020-10-22 NOTE — PROGRESS NOTES
Assessment/Plan:    *Diagnoses and all orders for this visit:    1. Medicare annual wellness visit, subsequent    2. Venous insufficiency  -     furosemide (LASIX) 20 mg tablet; TAKE ONE TABLET BY MOUTH DAILY AS NEEDED FOR SWELLING    3. Postmenopausal  -     DEXA BONE DENSITY STUDY AXIAL; Future    4. Essential hypertension    5. Hypothyroidism due to acquired atrophy of thyroid    Other orders  -     levothyroxine (SYNTHROID) 75 mcg tablet; TAKE 1 TABLET DAILY BEFORE BREAKFAST        F/u in 6 months. incrased her Synthroid to 75 mcg. The plan was discussed with the patient. The patient verbalized understanding and is in agreement with the plan. All medication potential side effects were discussed with the patient.    -------------------------------------------------------------------------------------------------------------------        Rosa Isela Boyd is a 76 y.o. female and presents with Annual Wellness Visit (due for bone density )         Subjective:  Hypothyroidism: TSH elevated. HTN:  Well controlled. Review of Systems - General ROS: negative         The problem list was updated as a part of today's visit.   Patient Active Problem List   Diagnosis Code    Hyperlipidemia E78.5    Depression F32.9    HH (hiatus hernia) K44.9    Gastroesophageal reflux disease K21.9    Rosacea L71.9    Capsulitis M77.9    Degeneration of intervertebral disc of cervical region M50.30    Allergy T78.40XA    Anxiety associated with depression F41.8    Raynaud's syndrome I73.00    Hypothyroidism E03.9    Pulmonary nodules R91.8    Renal cyst N28.1    Hypovitaminosis D E55.9    Biliary dyskinesia K82.8    Osteoarthritis M19.90    Dyslipidemia E78.5    Classical migraine with intractable migraine G43.119    Rotator cuff tear arthropathy M75.100, M12.819    Lower gastrointestinal hemorrhage K92.2    Cervico-occipital neuralgia M54.81    History of cervical spinal surgery Z98.890    History of knee surgery Z98.890    Diarrhea R19.7    Osteopenia M85.80    Generalized arthritis M19.90    Essential hypertension I10       The PSH, FH were reviewed. SH:  Social History     Tobacco Use    Smoking status: Never Smoker    Smokeless tobacco: Never Used   Substance Use Topics    Alcohol use: No    Drug use: No       Medications/Allergies:  Current Outpatient Medications on File Prior to Visit   Medication Sig Dispense Refill    SUMAtriptan (IMITREX) 6 mg/0.5 mL injection 6 mg by SubCUTAneous route as needed.  omega 3-dha-epa-fish oil (Fish Oil) 100-160-1,000 mg cap Take  by mouth daily.  riboflavin, vitamin B2, (Vitamin B-2) 100 mg tablet Take  by mouth daily.  candesartan (ATACAND) 8 mg tablet TAKE ONE TABLET BY MOUTH DAILY 90 Tab 1    pantoprazole (PROTONIX) 40 mg tablet TAKE ONE TABLET BY MOUTH DAILY 90 Tab 1    verapamil ER (VERELAN) 360 mg ER capsule TAKE 1 CAPSULE DAILY 90 Cap 1    atorvastatin (LIPITOR) 20 mg tablet TAKE ONE TABLET BY MOUTH DAILY 90 Tab 1    lidocaine (XYLOCAINE) 5 % ointment Apply to affected area three times daily as needed 50 g 2    galcanezumab-gnlm (EMGALITY SYRINGE) 120 mg/mL syrg by SubCUTAneous route.  naratriptan (AMERGE) 2.5 mg tab Take 2.5 mg by mouth once as needed.  calcium-cholecalciferol, d3, (CALCIUM 600 + D) 600-125 mg-unit tab Take  by mouth two (2) times a day.  coenzyme q10 (CHEW Q) 100 mg chew Take 200 mg by mouth daily.  multivitamin (ONE A DAY) tablet Take 1 Tab by mouth daily.  magnesium Tab Take 500 mg by mouth two (2) times a day.  dexlansoprazole (DEXILANT) 60 mg CpDB capsule (delayed release) Take  by mouth daily.  [DISCONTINUED] furosemide (LASIX) 20 mg tablet TAKE ONE TABLET BY MOUTH DAILY AS NEEDED FOR SWELLING 90 Tab 1    buPROPion (WELLBUTRIN) 100 mg tablet Take  by mouth daily.  b complex vitamins (B COMPLEX 1) tablet Take 1 Tab by mouth daily.       ascorbic acid, vitamin C, (VITAMIN C) 500 mg tablet Take  by mouth daily.  [DISCONTINUED] levothyroxine (SYNTHROID) 50 mcg tablet TAKE 1 TABLET DAILY BEFORE BREAKFAST 90 Tab 1     No current facility-administered medications on file prior to visit. Allergies   Allergen Reactions    Ace Inhibitors Cough         Health Maintenance:   Health Maintenance   Topic Date Due    Medicare Yearly Exam  06/19/2020    Flu Vaccine (1) 09/01/2020    Lipid Screen  10/14/2021    GLAUCOMA SCREENING Q2Y  04/08/2022    Breast Cancer Screen Mammogram  09/14/2022    Colorectal Cancer Screening Combo  06/02/2025    DTaP/Tdap/Td series (4 - Td) 04/22/2026    Hepatitis C Screening  Completed    Bone Densitometry (Dexa) Screening  Completed    Shingrix Vaccine Age 50>  Completed    Pneumococcal 65+ years  Completed       Objective:  Visit Vitals  /75   Pulse 70   Temp 97.3 °F (36.3 °C) (Temporal)   Resp 16   Ht 5' 7\" (1.702 m)   Wt 170 lb (77.1 kg)   LMP  (LMP Unknown)   SpO2 99%   BMI 26.63 kg/m²          Nurses notes and VS reviewed.       Physical Examination: Ears - bilateral TM's and external ear canals normal  Chest - clear to auscultation, no wheezes, rales or rhonchi, symmetric air entry  Heart - normal rate, regular rhythm, normal S1, S2, no murmurs, rubs, clicks or gallops  Abdomen - soft, nontender, nondistended, no masses or organomegaly  Musculoskeletal - no joint tenderness, deformity or swelling          Labwork and Ancillary Studies:    CBC w/Diff  Lab Results   Component Value Date/Time    WBC 6.1 10/14/2020 08:44 AM    HGB 14.7 10/14/2020 08:44 AM    PLATELET 498 46/60/8665 08:44 AM         Basic Metabolic Profile  Lab Results   Component Value Date/Time    Sodium 143 10/14/2020 08:44 AM    Potassium 4.5 10/14/2020 08:44 AM    Chloride 106 10/14/2020 08:44 AM    CO2 33 (H) 10/14/2020 08:44 AM    Anion gap 4 10/14/2020 08:44 AM    Glucose 79 10/14/2020 08:44 AM    BUN 23 (H) 10/14/2020 08:44 AM    Creatinine 0.93 10/14/2020 08:44 AM    BUN/Creatinine ratio 25 (H) 10/14/2020 08:44 AM    GFR est AA >60 10/14/2020 08:44 AM    GFR est non-AA 59 (L) 10/14/2020 08:44 AM    Calcium 9.9 10/14/2020 08:44 AM         LFT  Lab Results   Component Value Date/Time    ALT (SGPT) 23 10/14/2020 08:44 AM    Alk.  phosphatase 87 10/14/2020 08:44 AM    Bilirubin, direct 0.1 10/14/2020 08:44 AM    Bilirubin, total 0.3 10/14/2020 08:44 AM         Cholesterol  Lab Results   Component Value Date/Time    Cholesterol, total 159 10/14/2020 08:44 AM    HDL Cholesterol 67 (H) 10/14/2020 08:44 AM    LDL, calculated 79.6 10/14/2020 08:44 AM    Triglyceride 62 10/14/2020 08:44 AM    CHOL/HDL Ratio 2.4 10/14/2020 08:44 AM

## 2020-11-12 ENCOUNTER — APPOINTMENT (OUTPATIENT)
Dept: FAMILY MEDICINE CLINIC | Age: 75
End: 2020-11-12

## 2020-11-12 ENCOUNTER — VIRTUAL VISIT (OUTPATIENT)
Dept: FAMILY MEDICINE CLINIC | Age: 75
End: 2020-11-12
Payer: MEDICARE

## 2020-11-12 DIAGNOSIS — M79.672 LEFT FOOT PAIN: Primary | ICD-10-CM

## 2020-11-12 PROCEDURE — 99442 PR PHYS/QHP TELEPHONE EVALUATION 11-20 MIN: CPT | Performed by: INTERNAL MEDICINE

## 2020-11-12 NOTE — PROGRESS NOTES
Livia Newsome is a 76 y.o. female, evaluated via audio-only technology on 11/12/2020 for Foot Pain      Assessment & Plan:   Diagnoses and all orders for this visit:    1. Left foot pain  -     XR FOOT LT MIN 3 V; Future      Will await xray results. If just arthritis, she will need to follow with her rheumatologist.  May refer to podiatry. 12  Subjective:     Pt has had LT foot pain. She has had prior hx of fractures in that foot, was under Dr. Lucien Mtz care. Then 4-5 months ago, foot starting hurting so she went to [facility] and saw a PA. He then referred her to pain management and had \"some needles put in her foot\" that was supposed to make her pain better, but it didn't. Pain is predominantly along top of foot. Prior to Admission medications    Medication Sig Start Date End Date Taking? Authorizing Provider   furosemide (LASIX) 20 mg tablet TAKE ONE TABLET BY MOUTH DAILY AS NEEDED FOR SWELLING 10/28/20   Gilson Quinonez MD   SUMAtriptan (IMITREX) 6 mg/0.5 mL injection 6 mg by SubCUTAneous route as needed. Provider, Historical   omega 3-dha-epa-fish oil (Fish Oil) 100-160-1,000 mg cap Take  by mouth daily. Provider, Historical   riboflavin, vitamin B2, (Vitamin B-2) 100 mg tablet Take  by mouth daily. Provider, Historical   levothyroxine (SYNTHROID) 75 mcg tablet TAKE 1 TABLET DAILY BEFORE BREAKFAST 10/21/20   Gilson Quinonez MD   candesartan (ATACAND) 8 mg tablet TAKE ONE TABLET BY MOUTH DAILY 7/20/20   Gilson Quinonez MD   pantoprazole (PROTONIX) 40 mg tablet TAKE ONE TABLET BY MOUTH DAILY 7/9/20   Gilson Quinonez MD   verapamil ER (VERELAN) 360 mg ER capsule TAKE 1 CAPSULE DAILY 6/11/20   Gilson Quinonez MD   atorvastatin (LIPITOR) 20 mg tablet TAKE ONE TABLET BY MOUTH DAILY 5/25/20   Gilson Quinonez MD   dexlansoprazole (DEXILANT) 60 mg CpDB capsule (delayed release) Take  by mouth daily. Provider, Historical   buPROPion (WELLBUTRIN) 100 mg tablet Take  by mouth daily. Provider, Historical   b complex vitamins (B COMPLEX 1) tablet Take 1 Tab by mouth daily. Provider, Historical   ascorbic acid, vitamin C, (VITAMIN C) 500 mg tablet Take  by mouth daily. Provider, Historical   lidocaine (XYLOCAINE) 5 % ointment Apply to affected area three times daily as needed 11/21/19   Nick Leon MD   galcanezumab-[facility] SYRINGE) 120 mg/mL syrg by SubCUTAneous route. Provider, Historical   naratriptan (AMERGE) 2.5 mg tab Take 2.5 mg by mouth once as needed. Provider, Historical   calcium-cholecalciferol, d3, (CALCIUM 600 + D) 600-125 mg-unit tab Take  by mouth two (2) times a day. Provider, Historical   coenzyme q10 (CHEW Q) 100 mg chew Take 200 mg by mouth daily. Provider, Historical   multivitamin (ONE A DAY) tablet Take 1 Tab by mouth daily. Provider, Historical   magnesium Tab Take 500 mg by mouth two (2) times a day.  9/27/10   Provider, Historical     Patient Active Problem List   Diagnosis Code    Hyperlipidemia E78.5    Depression F32.9    HH (hiatus hernia) K44.9    Gastroesophageal reflux disease K21.9    Rosacea L71.9    Capsulitis M77.9    Degeneration of intervertebral disc of cervical region M50.30    Allergy T78.40XA    Anxiety associated with depression F41.8    Raynaud's syndrome I73.00    Hypothyroidism E03.9    Pulmonary nodules R91.8    Renal cyst N28.1    Hypovitaminosis D E55.9    Biliary dyskinesia K82.8    Osteoarthritis M19.90    Dyslipidemia E78.5    Classical migraine with intractable migraine G43.119    Rotator cuff tear arthropathy M75.100, M12.819    Lower gastrointestinal hemorrhage K92.2    Cervico-occipital neuralgia M54.81    History of cervical spinal surgery Z98.890    History of knee surgery Z98.890    Diarrhea R19.7    Osteopenia M85.80    Generalized arthritis M19.90    Essential hypertension I10     Current Outpatient Medications   Medication Sig Dispense Refill    furosemide (LASIX) 20 mg tablet TAKE ONE TABLET BY MOUTH DAILY AS NEEDED FOR SWELLING 90 Tab 1    SUMAtriptan (IMITREX) 6 mg/0.5 mL injection 6 mg by SubCUTAneous route as needed.  omega 3-dha-epa-fish oil (Fish Oil) 100-160-1,000 mg cap Take  by mouth daily.  riboflavin, vitamin B2, (Vitamin B-2) 100 mg tablet Take  by mouth daily.  levothyroxine (SYNTHROID) 75 mcg tablet TAKE 1 TABLET DAILY BEFORE BREAKFAST 90 Tab 2    candesartan (ATACAND) 8 mg tablet TAKE ONE TABLET BY MOUTH DAILY 90 Tab 1    pantoprazole (PROTONIX) 40 mg tablet TAKE ONE TABLET BY MOUTH DAILY 90 Tab 1    verapamil ER (VERELAN) 360 mg ER capsule TAKE 1 CAPSULE DAILY 90 Cap 1    atorvastatin (LIPITOR) 20 mg tablet TAKE ONE TABLET BY MOUTH DAILY 90 Tab 1    dexlansoprazole (DEXILANT) 60 mg CpDB capsule (delayed release) Take  by mouth daily.  buPROPion (WELLBUTRIN) 100 mg tablet Take  by mouth daily.  b complex vitamins (B COMPLEX 1) tablet Take 1 Tab by mouth daily.  ascorbic acid, vitamin C, (VITAMIN C) 500 mg tablet Take  by mouth daily.  lidocaine (XYLOCAINE) 5 % ointment Apply to affected area three times daily as needed 50 g 2    galcanezumab-gnlm (EMGALITY SYRINGE) 120 mg/mL syrg by SubCUTAneous route.  naratriptan (AMERGE) 2.5 mg tab Take 2.5 mg by mouth once as needed.  calcium-cholecalciferol, d3, (CALCIUM 600 + D) 600-125 mg-unit tab Take  by mouth two (2) times a day.  coenzyme q10 (CHEW Q) 100 mg chew Take 200 mg by mouth daily.  multivitamin (ONE A DAY) tablet Take 1 Tab by mouth daily.  magnesium Tab Take 500 mg by mouth two (2) times a day.        Allergies   Allergen Reactions    Ace Inhibitors Cough     Past Medical History:   Diagnosis Date    Allergy     Anxiety associated with depression 8/2/2011    Arthritis     Degeneration of intervertebral disc of cervical region     Most recent MRI was Jan 2017, done by Dr. Denisse Chopra    Degenerative disc disease     Depression     anxiety    Generalized arthritis 1/24/2018    GERD (gastroesophageal reflux disease)     Headache(784.0)     migraines    HH (hiatus hernia)     Hyperlipidemia     Hypertension     Hypovitaminosis D 10/19/2015    Osteopenia 6/7/2017    Other forms of migraine, without mention of intractable migraine without mention of status migrainosus     Pulmonary nodules 2/25/2010    Raynaud's syndrome     Renal cyst 2/25/2014    Rosacea      Past Surgical History:   Procedure Laterality Date    CARDIAC SURG PROCEDURE UNLIST      cardiac cath    HX BUNIONECTOMY      HX CARPAL TUNNEL RELEASE      right    HX CERVICAL FUSION  1987    C5-6    HX KNEE ARTHROSCOPY      RIGHT    HX LUMBAR FUSION  1988 &1997    L5-S1 surgical repair    HX MOHS PROCEDURES      HX OTHER SURGICAL Left 1-7-16    Dr Mary Barbour [facility] foot surgery     HX OTHER SURGICAL  1-2016    foot surgery    TOTAL KNEE ARTHROPLASTY  10/2011    right knee     Family History   Problem Relation Age of Onset    Stroke Mother     Stroke Father      Social History     Tobacco Use    Smoking status: Never Smoker    Smokeless tobacco: Never Used   Substance Use Topics    Alcohol use: No       ROS    No flowsheet data found. Victor Manuelliberty Christal, who was evaluated through a patient-initiated, synchronous (real-time) audio only encounter, and/or her healthcare decision maker, is aware that it is a billable service, with coverage as determined by her insurance carrier. She provided verbal consent to proceed: Yes. She has not had a related appointment within my department in the past 7 days or scheduled within the next 24 hours.       Total Time: minutes: 11-20 minutes    Agatha Johnston MD

## 2021-08-03 PROBLEM — E78.5 HYPERLIPIDEMIA: Status: RESOLVED | Noted: 2021-08-03 | Resolved: 2021-08-03

## 2022-03-18 PROBLEM — M19.90 GENERALIZED ARTHRITIS: Status: ACTIVE | Noted: 2018-01-24

## 2022-03-19 PROBLEM — I10 ESSENTIAL HYPERTENSION: Status: ACTIVE | Noted: 2018-02-26

## 2022-03-19 PROBLEM — R19.7 DIARRHEA: Status: ACTIVE | Noted: 2017-03-14

## 2022-03-20 PROBLEM — M85.80 OSTEOPENIA: Status: ACTIVE | Noted: 2017-06-07
